# Patient Record
Sex: MALE | Race: WHITE | NOT HISPANIC OR LATINO | Employment: FULL TIME | URBAN - METROPOLITAN AREA
[De-identification: names, ages, dates, MRNs, and addresses within clinical notes are randomized per-mention and may not be internally consistent; named-entity substitution may affect disease eponyms.]

---

## 2017-02-15 ENCOUNTER — ALLSCRIPTS OFFICE VISIT (OUTPATIENT)
Dept: OTHER | Facility: OTHER | Age: 47
End: 2017-02-15

## 2017-02-24 ENCOUNTER — ALLSCRIPTS OFFICE VISIT (OUTPATIENT)
Dept: OTHER | Facility: OTHER | Age: 47
End: 2017-02-24

## 2017-02-24 ENCOUNTER — GENERIC CONVERSION - ENCOUNTER (OUTPATIENT)
Dept: OTHER | Facility: OTHER | Age: 47
End: 2017-02-24

## 2017-02-27 ENCOUNTER — ALLSCRIPTS OFFICE VISIT (OUTPATIENT)
Dept: OTHER | Facility: OTHER | Age: 47
End: 2017-02-27

## 2017-03-10 ENCOUNTER — ALLSCRIPTS OFFICE VISIT (OUTPATIENT)
Dept: OTHER | Facility: OTHER | Age: 47
End: 2017-03-10

## 2017-07-06 ENCOUNTER — GENERIC CONVERSION - ENCOUNTER (OUTPATIENT)
Dept: OTHER | Facility: OTHER | Age: 47
End: 2017-07-06

## 2017-07-06 LAB
A/G RATIO (HISTORICAL): 1.3 (ref 1.2–2.2)
ALBUMIN SERPL BCP-MCNC: 4 G/DL (ref 3.5–5.5)
ALP SERPL-CCNC: 93 IU/L (ref 39–117)
ALT SERPL W P-5'-P-CCNC: 23 IU/L (ref 0–44)
AST SERPL W P-5'-P-CCNC: 16 IU/L (ref 0–40)
BASOPHILS # BLD AUTO: 0 %
BASOPHILS # BLD AUTO: 0 X10E3/UL (ref 0–0.2)
BILIRUB SERPL-MCNC: 0.4 MG/DL (ref 0–1.2)
BUN SERPL-MCNC: 11 MG/DL (ref 6–24)
BUN/CREA RATIO (HISTORICAL): 13 (ref 9–20)
CALCIUM SERPL-MCNC: 9 MG/DL (ref 8.7–10.2)
CHLORIDE SERPL-SCNC: 99 MMOL/L (ref 96–106)
CHOLEST SERPL-MCNC: 115 MG/DL (ref 100–199)
CHOLEST/HDLC SERPL: 3.6 RATIO UNITS (ref 0–5)
CO2 SERPL-SCNC: 24 MMOL/L (ref 18–29)
CREAT SERPL-MCNC: 0.82 MG/DL (ref 0.76–1.27)
DEPRECATED RDW RBC AUTO: 14.2 % (ref 12.3–15.4)
EGFR AFRICAN AMERICAN (HISTORICAL): 122 ML/MIN/1.73
EGFR-AMERICAN CALC (HISTORICAL): 105 ML/MIN/1.73
EOSINOPHIL # BLD AUTO: 0.3 X10E3/UL (ref 0–0.4)
EOSINOPHIL # BLD AUTO: 3 %
GLUCOSE SERPL-MCNC: 153 MG/DL (ref 65–99)
HBA1C MFR BLD HPLC: 7.2 % (ref 4.8–5.6)
HCT VFR BLD AUTO: 47.5 % (ref 37.5–51)
HDLC SERPL-MCNC: 32 MG/DL
HGB BLD-MCNC: 16.1 G/DL (ref 12.6–17.7)
IMM.GRANULOCYTES (CD4/8) (HISTORICAL): 0 %
IMM.GRANULOCYTES (CD4/8) (HISTORICAL): 0 X10E3/UL (ref 0–0.1)
INTERPRETATION (HISTORICAL): NORMAL
LDLC SERPL CALC-MCNC: 52 MG/DL (ref 0–99)
LYMPHOCYTES # BLD AUTO: 2.2 X10E3/UL (ref 0.7–3.1)
LYMPHOCYTES # BLD AUTO: 28 %
MCH RBC QN AUTO: 29.8 PG (ref 26.6–33)
MCHC RBC AUTO-ENTMCNC: 33.9 G/DL (ref 31.5–35.7)
MCV RBC AUTO: 88 FL (ref 79–97)
MONOCYTES # BLD AUTO: 0.4 X10E3/UL (ref 0.1–0.9)
MONOCYTES (HISTORICAL): 6 %
NEUTROPHILS # BLD AUTO: 4.9 X10E3/UL (ref 1.4–7)
NEUTROPHILS # BLD AUTO: 63 %
PLATELET # BLD AUTO: 229 X10E3/UL (ref 150–379)
POTASSIUM SERPL-SCNC: 4.5 MMOL/L (ref 3.5–5.2)
RBC (HISTORICAL): 5.41 X10E6/UL (ref 4.14–5.8)
SODIUM SERPL-SCNC: 139 MMOL/L (ref 134–144)
TOT. GLOBULIN, SERUM (HISTORICAL): 3 G/DL (ref 1.5–4.5)
TOTAL PROTEIN (HISTORICAL): 7 G/DL (ref 6–8.5)
TRIGL SERPL-MCNC: 156 MG/DL (ref 0–149)
VLDLC SERPL CALC-MCNC: 31 MG/DL (ref 5–40)
WBC # BLD AUTO: 7.8 X10E3/UL (ref 3.4–10.8)

## 2017-07-07 ENCOUNTER — GENERIC CONVERSION - ENCOUNTER (OUTPATIENT)
Dept: OTHER | Facility: OTHER | Age: 47
End: 2017-07-07

## 2017-07-07 ENCOUNTER — ALLSCRIPTS OFFICE VISIT (OUTPATIENT)
Dept: OTHER | Facility: OTHER | Age: 47
End: 2017-07-07

## 2017-07-10 ENCOUNTER — ALLSCRIPTS OFFICE VISIT (OUTPATIENT)
Dept: OTHER | Facility: OTHER | Age: 47
End: 2017-07-10

## 2017-11-04 LAB — HBA1C MFR BLD HPLC: 7.1 % (ref 4.8–5.6)

## 2017-11-05 LAB
A/G RATIO (HISTORICAL): 1.6 (ref 1.2–2.2)
ALBUMIN SERPL BCP-MCNC: 4.4 G/DL (ref 3.5–5.5)
ALP SERPL-CCNC: 98 IU/L (ref 39–117)
ALT SERPL W P-5'-P-CCNC: 29 IU/L (ref 0–44)
AST SERPL W P-5'-P-CCNC: 19 IU/L (ref 0–40)
BASOPHILS # BLD AUTO: 0 %
BASOPHILS # BLD AUTO: 0 X10E3/UL (ref 0–0.2)
BILIRUB SERPL-MCNC: 0.5 MG/DL (ref 0–1.2)
BUN SERPL-MCNC: 11 MG/DL (ref 6–24)
BUN/CREA RATIO (HISTORICAL): 14 (ref 9–20)
CALCIUM SERPL-MCNC: 9.5 MG/DL (ref 8.7–10.2)
CHLORIDE SERPL-SCNC: 97 MMOL/L (ref 96–106)
CO2 SERPL-SCNC: 26 MMOL/L (ref 18–29)
CREAT SERPL-MCNC: 0.81 MG/DL (ref 0.76–1.27)
DEPRECATED RDW RBC AUTO: 14.1 % (ref 12.3–15.4)
EGFR AFRICAN AMERICAN (HISTORICAL): 121 ML/MIN/1.73
EGFR-AMERICAN CALC (HISTORICAL): 105 ML/MIN/1.73
EOSINOPHIL # BLD AUTO: 0.3 X10E3/UL (ref 0–0.4)
EOSINOPHIL # BLD AUTO: 4 %
GLUCOSE SERPL-MCNC: 133 MG/DL (ref 65–99)
HCT VFR BLD AUTO: 47.5 % (ref 37.5–51)
HGB BLD-MCNC: 16.3 G/DL (ref 12.6–17.7)
IMM.GRANULOCYTES (CD4/8) (HISTORICAL): 0 %
IMM.GRANULOCYTES (CD4/8) (HISTORICAL): 0 X10E3/UL (ref 0–0.1)
LYMPHOCYTES # BLD AUTO: 2.4 X10E3/UL (ref 0.7–3.1)
LYMPHOCYTES # BLD AUTO: 32 %
MCH RBC QN AUTO: 30.5 PG (ref 26.6–33)
MCHC RBC AUTO-ENTMCNC: 34.3 G/DL (ref 31.5–35.7)
MCV RBC AUTO: 89 FL (ref 79–97)
MONOCYTES # BLD AUTO: 0.5 X10E3/UL (ref 0.1–0.9)
MONOCYTES (HISTORICAL): 6 %
NEUTROPHILS # BLD AUTO: 4.4 X10E3/UL (ref 1.4–7)
NEUTROPHILS # BLD AUTO: 58 %
PLATELET # BLD AUTO: 207 X10E3/UL (ref 150–379)
POTASSIUM SERPL-SCNC: 5.1 MMOL/L (ref 3.5–5.2)
RBC (HISTORICAL): 5.35 X10E6/UL (ref 4.14–5.8)
SODIUM SERPL-SCNC: 137 MMOL/L (ref 134–144)
TOT. GLOBULIN, SERUM (HISTORICAL): 2.7 G/DL (ref 1.5–4.5)
TOTAL PROTEIN (HISTORICAL): 7.1 G/DL (ref 6–8.5)
WBC # BLD AUTO: 7.6 X10E3/UL (ref 3.4–10.8)

## 2017-11-06 ENCOUNTER — GENERIC CONVERSION - ENCOUNTER (OUTPATIENT)
Dept: OTHER | Facility: OTHER | Age: 47
End: 2017-11-06

## 2017-11-08 ENCOUNTER — ALLSCRIPTS OFFICE VISIT (OUTPATIENT)
Dept: OTHER | Facility: OTHER | Age: 47
End: 2017-11-08

## 2018-01-10 NOTE — RESULT NOTES
Verified Results  [facility]) CMP14+eGFR [id number] 09:40AM Christy Peraza     Test Name Result Flag Reference   Glucose, Serum 152 mg/dL H 65-99   BUN 12 mg/dL  6-24   Creatinine, Serum 0 84 mg/dL  0 76-1 27   eGFR If NonAfricn Am 105 mL/min/1 73  >59   eGFR If Africn Am 121 mL/min/1 73  >59   BUN/Creatinine Ratio 14  9-20   Sodium, Serum 140 mmol/L  134-144   Potassium, Serum 4 5 mmol/L  3 5-5 2   Chloride, Serum 101 mmol/L     Carbon Dioxide, Total 24 mmol/L  18-29   Calcium, Serum 9 0 mg/dL  8 7-10 2   Protein, Total, Serum 6 9 g/dL  6 0-8 5   Albumin, Serum 4 1 g/dL  3 5-5 5   Globulin, Total 2 8 g/dL  1 5-4 5   A/G Ratio 1 5  1 1-2 5   Bilirubin, Total 0 4 mg/dL  0 0-1 2   Alkaline Phosphatase, S 99 IU/L     AST (SGOT) 14 IU/L  0-40   ALT (SGPT) 22 IU/L  0-44     (LC) Lipid Panel With LDL/HDL Ratio [id number] 09:40AM [facility]     Test Name Result Flag Reference   Cholesterol, Total 121 mg/dL  100-199   Triglycerides 161 mg/dL H 0-149   HDL Cholesterol 27 mg/dL L >39   According to ATP-III Guidelines, HDL-C >59 mg/dL is considered a  negative risk factor for CHD  VLDL Cholesterol Lon 32 mg/dL  5-40   LDL Cholesterol Calc 62 mg/dL  0-99   LDL/HDL Ratio 2 3 ratio units  0 0-3 6   LDL/HDL Ratio                                                             Men  Women                                               1/2 Avg  Risk  1 0    1 5                                                   Avg Risk  3 6    3 2                                                2X Avg  Risk  6 2    5 0                                                3X Avg  Risk  8 0    6 1     (LC) Microalbumin, Random Urine [id number] 09:40AM [facility]     Test Name Result Flag Reference   Microalbumin, Urine 97 9 ug/mL H 0 0-17 0       Discussion/Summary   please make appt to discuss labs

## 2018-01-10 NOTE — RESULT NOTES
Discussion/Summary   will discuss labs at follow up appt     Verified Results  (1) CBC/PLT/DIFF [id number] 08:23AM [facility]     Test Name Result Flag Reference   WBC 7 8 x10E3/uL  3 4-10 8   RBC 5 41 x10E6/uL  4 14-5 80   Hemoglobin 16 1 g/dL  12 6-17 7   Hematocrit 47 5 %  37 5-51 0   MCV 88 fL  79-97   MCH 29 8 pg  26 6-33 0   MCHC 33 9 g/dL  31 5-35 7   RDW 14 2 %  12 3-15 4   Platelets 808 H31A8/UV  150-379   Neutrophils 63 %     Lymphs 28 %     Monocytes 6 %     Eos 3 %     Basos 0 %     Neutrophils (Absolute) 4 9 x10E3/uL  1 4-7 0   Lymphs (Absolute) 2 2 x10E3/uL  0 7-3 1   Monocytes(Absolute) 0 4 x10E3/uL  0 1-0 9   Eos (Absolute) 0 3 x10E3/uL  0 0-0 4   Baso (Absolute) 0 0 x10E3/uL  0 0-0 2   Immature Granulocytes 0 %     Immature Grans (Abs) 0 0 x10E3/uL  0 0-0 1     (1) COMPREHENSIVE METABOLIC PANEL [id number] 40:83ZP [facility]     Test Name Result Flag Reference   Glucose, Serum 153 mg/dL H 65-99   BUN 11 mg/dL  6-24   Creatinine, Serum 0 82 mg/dL  0 76-1 27   BUN/Creatinine Ratio 13  9-20   Sodium, Serum 139 mmol/L  134-144   Potassium, Serum 4 5 mmol/L  3 5-5 2   Chloride, Serum 99 mmol/L     Carbon Dioxide, Total 24 mmol/L  18-29   Calcium, Serum 9 0 mg/dL  8 7-10 2   Protein, Total, Serum 7 0 g/dL  6 0-8 5   Albumin, Serum 4 0 g/dL  3 5-5 5   Globulin, Total 3 0 g/dL  1 5-4 5   A/G Ratio 1 3  1 2-2 2   Bilirubin, Total 0 4 mg/dL  0 0-1 2   Alkaline Phosphatase, S 93 IU/L     AST (SGOT) 16 IU/L  0-40   ALT (SGPT) 23 IU/L  0-44   eGFR If NonAfricn Am 105 mL/min/1 73  >59   eGFR If Africn Am 122 mL/min/1 73  >59     (1) LIPID PANEL, FASTING [id number] 08:23AM Aly Villarreal     Test Name Result Flag Reference   Cholesterol, Total 115 mg/dL  100-199   Triglycerides 156 mg/dL H 0-149   HDL Cholesterol 32 mg/dL L >39   VLDL Cholesterol Lon 31 mg/dL  5-40   LDL Cholesterol Calc 52 mg/dL  0-99   T  Chol/HDL Ratio 3 6 ratio units  0 0-5 0   T   Chol/HDL Ratio Men  Women                                               1/2 Avg  Risk  3 4    3 3                                                   Avg Risk  5 0    4 4                                                2X Avg  Risk  9 6    7 1                                                3X Avg  Risk 23 4   11 0     (1) HEMOGLOBIN A1C [id number] 08:23AM [email or web address]     Test Name Result Flag Reference   Hemoglobin A1c 7 2 % H 4 8-5 6   Pre-diabetes: 5 7 - 6 4           Diabetes: >6 4           Glycemic control for adults with diabetes: <7 0     [facility]) Cardiovascular Risk Assessment [id number] 08:23AM [email or web address]     Test Name Result Flag Reference   Interpretation Note     Supplement report is available  PDF Image

## 2018-01-10 NOTE — RESULT NOTES
Verified Results  (1924 [facility]) Hemoglobin A1c [id number] 09:40AM [place] [facility]     Test Name Result Flag Reference   Hemoglobin A1c 7 8 %Hb H    Reference Range:  American Diabetes Association (ADA) Guidelines:  <5 7: Decreased risk for diabetes  5 7 - 6 4: Increased risk for diabetes  >6 4: Ongoing Hyperglycemia of any cause  <7 0: Glycemic control for adults with diabetes   Estimated Average Glucose 177 mg/dL         Discussion/Summary   will discuss labs at follow up appt

## 2018-01-12 VITALS
HEART RATE: 77 BPM | HEIGHT: 71 IN | BODY MASS INDEX: 44.1 KG/M2 | WEIGHT: 315 LBS | SYSTOLIC BLOOD PRESSURE: 120 MMHG | OXYGEN SATURATION: 95 % | DIASTOLIC BLOOD PRESSURE: 80 MMHG

## 2018-01-12 NOTE — RESULT NOTES
Verified Results  (1) HEMOGLOBIN A1C [id number] 09:40AM Juan José Musca     Test Name Result Flag Reference   Hemoglobin A1c 7 1 % H 4 8-5 6   Pre-diabetes: 5 7 - 6 4           Diabetes: >6 4           Glycemic control for adults with diabetes: <7 0     (1) MICROALBUMIN CREATININE RATIO, RANDOM URINE [id number] 09:40AM Juan José Musca     Test Name Result Flag Reference   Creatinine, Urine 172 7 mg/dL  Not Estab  Microalbumin, Urine 105 8 ug/mL  Not Estab     Microalb/Creat Ratio 61 3 mg/g creat H 0 0-30 0     (1) CBC/PLT/DIFF [id number] 09:40AM Juan José Musca     Test Name Result Flag Reference   WBC 9 0 x10E3/uL  3 4-10 8   RBC 5 59 x10E6/uL  4 14-5 80   Hemoglobin 16 6 g/dL  12 6-17 7   Hematocrit 48 2 %  37 5-51 0   MCV 86 fL  79-97   MCH 29 7 pg  26 6-33 0   MCHC 34 4 g/dL  31 5-35 7   RDW 14 0 %  12 3-15 4   Platelets 153 Z55P5/CLEO  150-379   Neutrophils 67 %     Lymphs 25 %     Monocytes 5 %     Eos 3 %     Basos 0 %     Neutrophils (Absolute) 5 9 x10E3/uL  1 4-7 0   Lymphs (Absolute) 2 3 x10E3/uL  0 7-3 1   Monocytes(Absolute) 0 5 x10E3/uL  0 1-0 9   Eos (Absolute) 0 3 x10E3/uL  0 0-0 4   Baso (Absolute) 0 0 x10E3/uL  0 0-0 2   Immature Granulocytes 0 %     Immature Grans (Abs) 0 0 x10E3/uL  0 0-0 1     (1) COMPREHENSIVE METABOLIC PANEL [id number] 67:98GT Juan José Musca     Test Name Result Flag Reference   Glucose, Serum 146 mg/dL H 65-99   BUN 14 mg/dL  6-24   Creatinine, Serum 0 89 mg/dL  0 76-1 27   eGFR If NonAfricn Am 102 mL/min/1 73  >59   eGFR If Africn Am 118 mL/min/1 73  >59   BUN/Creatinine Ratio 16  9-20   Sodium, Serum 140 mmol/L  134-144   Potassium, Serum 4 7 mmol/L  3 5-5 2   Chloride, Serum 99 mmol/L     Carbon Dioxide, Total 26 mmol/L  18-29   Calcium, Serum 9 5 mg/dL  8 7-10 2   Protein, Total, Serum 7 3 g/dL  6 0-8 5   Albumin, Serum 4 3 g/dL  3 5-5 5   Globulin, Total 3 0 g/dL  1 5-4 5   A/G Ratio 1 4  1 1-2 5   **Effective March 13, 2017 the reference interval** for A/G Ratio will be changing to:                               Age                Male          Female                           0 -  7 days       1 1 - 2 3       1 1 - 2 3                           8 - 30 days       1 2 - 2 8       1 2 - 2 8                           1 -  6 months     1 3 - 3 6       1 3 - 3 6                    7 months -  5 years      1 5 - 2 6       1 5 - 2 6                              > 5 years      1 2 - 2 2       1 2 - 2 2   Bilirubin, Total 0 5 mg/dL  0 0-1 2   Alkaline Phosphatase, S 97 IU/L     AST (SGOT) 12 IU/L  0-40   ALT (SGPT) 20 IU/L  0-44       Discussion/Summary   will discuss labs at follow up appt

## 2018-01-13 VITALS
HEIGHT: 71 IN | SYSTOLIC BLOOD PRESSURE: 122 MMHG | HEART RATE: 74 BPM | WEIGHT: 315 LBS | BODY MASS INDEX: 44.1 KG/M2 | DIASTOLIC BLOOD PRESSURE: 86 MMHG | RESPIRATION RATE: 16 BRPM | TEMPERATURE: 97.1 F

## 2018-01-13 NOTE — RESULT NOTES
Verified Results  (1) COMPREHENSIVE METABOLIC PANEL [id number] 42:13FH Brock Dill     Test Name Result Flag Reference   Glucose, Serum 113 mg/dL H 65-99   BUN 11 mg/dL  6-24   Creatinine, Serum 0 79 mg/dL  0 76-1 27   eGFR If NonAfricn Am 107 mL/min/1 73  >59   eGFR If Africn Am 124 mL/min/1 73  >59   BUN/Creatinine Ratio 14  9-20   Sodium, Serum 137 mmol/L  136-144   **Please note reference interval change**   Potassium, Serum 4 1 mmol/L  3 5-5 2   **Please note reference interval change**   Chloride, Serum 96 mmol/L L    **Please note reference interval change**   Carbon Dioxide, Total 24 mmol/L  18-29   Calcium, Serum 9 1 mg/dL  8 7-10 2   Protein, Total, Serum 7 0 g/dL  6 0-8 5   Albumin, Serum 4 2 g/dL  3 5-5 5   Globulin, Total 2 8 g/dL  1 5-4 5   A/G Ratio 1 5  1 1-2 5   Bilirubin, Total 0 4 mg/dL  0 0-1 2   Alkaline Phosphatase, S 97 IU/L     AST (SGOT) 13 IU/L  0-40   ALT (SGPT) 18 IU/L  0-44     (1) CBC/PLT/DIFF 22Oct2016 09:08AM Brock Dill     Test Name Result Flag Reference   WBC 8 8 x10E3/uL  3 4-10 8   RBC 5 34 x10E6/uL  4 14-5 80   Hemoglobin 15 7 g/dL  12 6-17 7   Hematocrit 46 2 %  37 5-51 0   MCV 87 fL  79-97   MCH 29 4 pg  26 6-33 0   MCHC 34 0 g/dL  31 5-35 7   RDW 13 9 %  12 3-15 4   Platelets 018 V61O5/PG  150-379   Neutrophils 65 %     Lymphs 26 %     Monocytes 6 %     Eos 3 %     Basos 0 %     Neutrophils (Absolute) 5 7 x10E3/uL  1 4-7 0   Lymphs (Absolute) 2 3 x10E3/uL  0 7-3 1   Monocytes(Absolute) 0 5 x10E3/uL  0 1-0 9   Eos (Absolute) 0 2 x10E3/uL  0 0-0 4   Baso (Absolute) 0 0 x10E3/uL  0 0-0 2   Immature Granulocytes 0 %     Immature Grans (Abs) 0 0 x10E3/uL  0 0-0 1     (1) LIPID PANEL, FASTING 22Oct2016 09:08AM [place] Dill     Test Name Result Flag Reference   Cholesterol, Total 156 mg/dL  100-199   Triglycerides 167 mg/dL H 0-149   HDL Cholesterol 32 mg/dL L >39   According to ATP-III Guidelines, HDL-C >59 mg/dL is considered a  negative risk factor for CHD    VLDL Cholesterol Lon 33 mg/dL  5-40   LDL Cholesterol Calc 91 mg/dL  0-99   T  Chol/HDL Ratio 4 9 ratio units  0 0-5 0   T  Chol/HDL Ratio                                                             Men  Women                                               1/2 Avg  Risk  3 4    3 3                                                   Avg Risk  5 0    4 4                                                2X Avg  Risk  9 6    7 1                                                3X Avg  Risk 23 4   11 0     (1) HEMOGLOBIN A1C 22Oct2016 09:08AM [place] [facility]     Test Name Result Flag Reference   Hemoglobin A1c 6 9 % H 4 8-5 6   Pre-diabetes: 5 7 - 6 4           Diabetes: >6 4           Glycemic control for adults with diabetes: <7 0     [facility]) Cardiovascular Risk Assessment 22Oct2016 09:08AM [place] [facility]     Test Name Result Flag Reference   Interpretation Note     Supplement report is available  PDF Image            Discussion/Summary   Will discuss labs at follow up appt

## 2018-01-13 NOTE — RESULT NOTES
Discussion/Summary   will discuss labs at follow up appt     Verified Results  (1) CBC/PLT/DIFF [id number] 09:05AM [place] 87     Test Name Result Flag Reference   WBC 7 6 x10E3/uL  3 4-10 8   RBC 5 35 x10E6/uL  4 14-5 80   Hemoglobin 16 3 g/dL  12 6-17 7   Hematocrit 47 5 %  37 5-51 0   MCV 89 fL  79-97   MCH 30 5 pg  26 6-33 0   MCHC 34 3 g/dL  31 5-35 7   RDW 14 1 %  12 3-15 4   Platelets 481 A79F4/NI  150-379   Neutrophils 58 %  Not Estab  Lymphs 32 %  Not Estab  Monocytes 6 %  Not Estab  Eos 4 %  Not Estab  Basos 0 %  Not Estab  Neutrophils (Absolute) 4 4 x10E3/uL  1 4-7 0   Lymphs (Absolute) 2 4 x10E3/uL  0 7-3 1   Monocytes(Absolute) 0 5 x10E3/uL  0 1-0 9   Eos (Absolute) 0 3 x10E3/uL  0 0-0 4   Baso (Absolute) 0 0 x10E3/uL  0 0-0 2   Immature Granulocytes 0 %  Not Estab     Immature Grans (Abs) 0 0 x10E3/uL  0 0-0 1     (1) COMPREHENSIVE METABOLIC PANEL [id number] 70:86SC [place] 87     Test Name Result Flag Reference   Glucose, Serum 133 mg/dL H 65-99   BUN 11 mg/dL  6-24   Creatinine, Serum 0 81 mg/dL  0 76-1 27   BUN/Creatinine Ratio 14  9-20   Sodium, Serum 137 mmol/L  134-144   Potassium, Serum 5 1 mmol/L  3 5-5 2   Chloride, Serum 97 mmol/L     Carbon Dioxide, Total 26 mmol/L  18-29   Calcium, Serum 9 5 mg/dL  8 7-10 2   Protein, Total, Serum 7 1 g/dL  6 0-8 5   Albumin, Serum 4 4 g/dL  3 5-5 5   Globulin, Total 2 7 g/dL  1 5-4 5   A/G Ratio 1 6  1 2-2 2   Bilirubin, Total 0 5 mg/dL  0 0-1 2   Alkaline Phosphatase, S 98 IU/L     AST (SGOT) 19 IU/L  0-40   ALT (SGPT) 29 IU/L  0-44   eGFR If NonAfricn Am 105 mL/min/1 73  >59   eGFR If Africn Am 121 mL/min/1 73  >59     (1) HEMOGLOBIN A1C [id number] 09:05AM [place] 87     Test Name Result Flag Reference   Hemoglobin A1c 7 1 % H 4 8-5 6   Pre-diabetes: 5 7 - 6 4           Diabetes: >6 4           Glycemic control for adults with diabetes: <7 0

## 2018-01-15 VITALS
TEMPERATURE: 97.4 F | BODY MASS INDEX: 43.54 KG/M2 | SYSTOLIC BLOOD PRESSURE: 114 MMHG | DIASTOLIC BLOOD PRESSURE: 78 MMHG | RESPIRATION RATE: 20 BRPM | HEIGHT: 71 IN | WEIGHT: 311 LBS | HEART RATE: 76 BPM

## 2018-01-15 NOTE — PROGRESS NOTES
Assessment    1  Encounter for preventive health examination (V70 0) (Z00 00)   2  Diabetes mellitus with polyneuropathy (250 60,357 2) (E11 42)   3  Chronic hypertension (401 9) (I10)   4  Systolic congestive heart failure (428 20,428 0) (I50 20)   5  Dyslipidemia (272 4) (E78 5)   6  Catheter Ablation Atrial Flutter   7  Need for vaccination (V05 9) (Z23)    Plan  Catheter Ablation Atrial Flutter, Chronic hypertension, Diabetes mellitus with  polyneuropathy, Dyslipidemia, Systolic congestive heart failure    · (1) CBC/PLT/DIFF; Status:Active; Requested for:08Mar2018;    · (1) COMPREHENSIVE METABOLIC PANEL; Status:Active; Requested for:08Mar2018;    · (1) HEMOGLOBIN A1C; Status:Active; Requested for:08Mar2018;    · (1) LIPID PANEL, FASTING; Status:Active; Requested for:08Mar2018;    · (1) MICROALBUMIN CREATININE RATIO, RANDOM URINE; Status:Active;  Requested  for:08Mar2018;   Chronic hypertension, Systolic congestive heart failure    · Enalapril Maleate 20 MG Oral Tablet; TAKE 1 TABLET TWICE DAILY  Diabetes mellitus with polyneuropathy    · Farxiga 10 MG Oral Tablet; take 1 tablet every day   · Tresiba FlexTouch 200 UNIT/ML Subcutaneous Solution Pen-injector; inject 45  units sub q qhs   · 2 - [facility]  *  Status: Hold For - Scheduling   Requested for: 09YIA4508  Care Summary provided  : Yes   · Valente Salcedo  ([facility]) Co-Management  *  Status: Hold For - Scheduling   Requested for: 33RMN5332  Care Summary provided  : Yes  Diabetes mellitus with polyneuropathy, Diabetes type 2, uncontrolled    · Rosuvastatin Calcium 20 MG Oral Tablet; take 1 tablet by mouth once daily  Diabetes type 2, uncontrolled    · Lantus SoloStar 100 UNIT/ML Subcutaneous Solution Pen-injector  Diabetes type 2, uncontrolled, Malignant hypertension with systolic CHF, NYHA class 2    · AmLODIPine Besylate 5 MG Oral Tablet (Norvasc); take 1 tablet by mouth  once daily as directed  Elevated triglycerides with high cholesterol    · Vascepa 1 GM Oral Capsule; TAKE 2 CAPSULE TWICE A DAY  Need for vaccination    · Follow-up visit in 4 Months Evaluation and Treatment  Follow-up  Status: Hold For -  Scheduling  Requested for: 59DWO3016   · Fluzone Quadrivalent Intramuscular Suspension; 0 5ml; To Be Done:  39POW9059  Systolic congestive heart failure    · Carvedilol 25 MG Oral Tablet; TAKE 1 TABLET TWICE DAILY WITH MEALS   · Spironolactone 25 MG Oral Tablet; TAKE 1 TABLET DAILY    Discussion/Summary    Meds adjusted  ivokana stopped    will follow labs in 4 months  Chief Complaint  pt present for CPE  ac/cma      History of Present Illness  HM, Adult Male: The patient is being seen for a health maintenance evaluation  General Health:   Screening:   HPI: pt is here for full physical has labs    pt states his ins is dropping his insulin    pt states he had minor numbness and tingling in the feet      Review of Systems    Constitutional: No fever or chills, feels well, no tiredness, no recent weight gain or weight loss  Eyes: No complaints of eye pain, no red eyes, no discharge from eyes, no itchy eyes  ENT: no complaints of earache, no hearing loss, no nosebleeds, no nasal discharge, no sore throat, no hoarseness  Cardiovascular: No complaints of slow heart rate, no fast heart rate, no chest pain, no palpitations, no leg claudication, no lower extremity  Respiratory: No complaints of shortness of breath, no wheezing, no cough, no SOB on exertion, no orthopnea or PND  Gastrointestinal: No complaints of abdominal pain, no constipation, no nausea or vomiting, no diarrhea or bloody stools  Genitourinary: No complaints of dysuria, no incontinence, no hesitancy, no nocturia, no genital lesion, no testicular pain  Musculoskeletal: No complaints of arthralgia, no myalgias, no joint swelling or stiffness, no limb pain or swelling     Integumentary: No complaints of skin rash or skin lesions, no itching, no skin wound, no dry skin  Neurological: No compliants of headache, no confusion, no convulsions, no numbness or tingling, no dizziness or fainting, no limb weakness, no difficulty walking  Psychiatric: Is not suicidal, no sleep disturbances, no anxiety or depression, no change in personality, no emotional problems  Endocrine: No complaints of proptosis, no hot flashes, no muscle weakness, no erectile dysfunction, no deepening of the voice, no feelings of weakness  Hematologic/Lymphatic: No complaints of swollen glands, no swollen glands in the neck, does not bleed easily, no easy bruising  Active Problems    1  Abnormal electrocardiogram (794 31) (R94 31)   2  BMI 40 0-44 9, adult (V85 41) (Z68 41)   3  Catheter Ablation Atrial Flutter   4  Chronic hypertension (401 9) (I10)   5  Diabetes mellitus with polyneuropathy (250 60,357 2) (E11 42)   6  Dyslipidemia (272 4) (E78 5)   7  Elevated triglycerides with high cholesterol (272 2) (E78 2)   8  Encounter for screening for cardiovascular disorders (V81 2) (Z13 6)   9  Former smoker (Z87 891)   10  General medical examination (V70 9) (Z00 00)   11  Heart disease (429 9) (I51 9)   12  Hospital discharge follow-up (V67 59) (Z09)   13  Hypoventilation associated with obesity (278 03) (E66 2)   14  Malignant hypertension with systolic CHF, NYHA class 2 (401 0,428 20,428 0)    (I11 0,I50 20)   15  Need for vaccination (V05 9) (Z23)   16  Obesity, Class III, BMI 40-49 9 (morbid obesity) (278 01) (E66 01)   17  Screening for diabetes mellitus (DM) (V77 1) (Z13 1)   18  Systolic congestive heart failure (428 20,428 0) (I50 20)   19  Venous insufficiency (chronic) (peripheral) (459 81) (I87 2)   20   Vitamin D deficiency (268 9) (E55 9)    Past Medical History    · History of _ (278 00)   · History of Acute bacterial prostatitis (601 0) (N41 0)   · History of Cellulitis of toe of right foot (681 10) (L03 031)   · History of Dermatophytosis of groin (110 3) (B35 6)   · History of atrial flutter (V12 59) (Z86 79)   · History of cough   · History of hypertension (V12 59) (Z86 79)   · History of hypokalemia (V12 29) (Z86 39)   · History of ingrown nail (V13 3) (Z87 2)   · History of obesity (V13 89) (Z86 39)   · History of obesity (V13 89) (Z86 39)   · History of onychomycosis (V12 09) (Z86 19)   · History of sleep apnea (V13 89) (Z86 69)   · History of type 2 diabetes mellitus (V12 29) (Z86 39)   · History of Hordeolum internum of right upper eyelid (373 12) (H00 021)   · History of Need for vaccination (V05 9) (Z23)   · History of Need for vaccination (V05 9) (Z23)   · History of Pre-op testing (V72 84) (Z01 818)   · History of Tinea pedis of both feet (110 4) (B35 3)   · History of Tinea pedis of right foot (110 4) (B35 3)    Surgical History    · History of Surgery Vas Deferens Vasectomy    Family History  Mother    · Family history of arthritis (V17 7) (Z82 61)   · Family history of cardiac arrhythmia (V17 49) (Z82 49)   · Family history of hypertension (V17 49) (Z82 49)  Father    · Family history of Anxiety   · Family history of arthritis (V17 7) (Z82 61)   · Family history of atrial fibrillation (V17 49) (Z82 49)   · Family history of hypertension (V17 49) (Z82 49)   · Family history of Impaired cognition    Social History    · Exercise: Walking   · Former smoker (F54 162)   ·    · Occasional alcohol use   · 1 drink/month   · Sleeps 6 -7 hours a day    Current Meds   1  AmLODIPine Besylate 5 MG Oral Tablet; take 1 tablet by mouth once daily as directed; Therapy: 21Mar2016 to (Richard Osnoreen)  Requested for: 28QHU1844; Last   Rx:34Rik7900 Ordered   2  BD Pen Needle Short U/F 31G X 8 MM Miscellaneous; use as directed; Therapy: 74Iut2454 to (Evaluate:07Vun7057)  Requested for: 16SCG7632; Last   Rx:70Iyl9436 Ordered   3  Carvedilol 25 MG Oral Tablet; TAKE 1 TABLET TWICE DAILY WITH MEALS;    Therapy: 04NDT0518 to (Evaluate:19Mar2018)  Requested for: 94AEQ4512; Last Rx:27Mar2017 Ordered   4  CoQ-10 200 MG CAPS; TAKE 1 CAPSULE DAILY WITH CHOLESTEROL MEDICATON; Therapy: 21NJS4174 to (Evaluate:27Pvo3391)  Requested for: 50BCJ2450; Last   Rx:05Apr2016 Ordered   5  Enalapril Maleate 20 MG Oral Tablet; TAKE 1 TABLET TWICE DAILY  Requested for:   64REL9891; Last Rx:45Pnq6629 Ordered   6  Lantus SoloStar 100 UNIT/ML Subcutaneous Solution Pen-injector; INJECT 45 UNITS   SUBCUTANEOUSLY ONCE DAILY AS DIRECTED; Therapy: 17ZDU5405 to (Evaluate:06Zuu7304)  Requested for: 02BVL7295; Last   Rx:01Nov2017 Ordered   7  Nyamyc 803607 UNIT/GM External Powder; APPLY TO AFFECTED AREA TWICE A DAY; Therapy: 07HGQ3166 to (Evaluate:28Anv1781)  Requested for: 07GDK9020; Last   Rx:09Jun2017 Ordered   8  OneTouch Delica Lancets Fine Miscellaneous; Test BS twice daily or as needed  ; Therapy: 30URX3414 to (Last Rx:15Jan2016)  Requested for: 71AMB0840 Ordered   9  OneTouch Lancets Miscellaneous; 1 Two Times A Day 250; Therapy: 24VAG7664 to (Last Rx:15Jan2016)  Requested for: 85NWF8183 Ordered   10  OneTouch Ultra Blue In Vitro Strip; test BID as directed, 250, insulin using; Therapy: 99FOM0918 to (Last Rx:15Jan2016)  Requested for: 83ZQD7941 Ordered   11  OneTouch Ultra System w/Device Kit; (250 00) test once daily; Therapy: 85VKN8643 to (Last Rx:15Jan2016)  Requested for: 74DGX2431 Ordered   12  Rosuvastatin Calcium 20 MG Oral Tablet; take 1 tablet by mouth once daily; Therapy: 73WPL1194 to (Evaluate:86Bhf8701)  Requested for: 17DQT0374; Last    Rx:99Bvd0961 Ordered   13  Spironolactone 25 MG Oral Tablet; TAKE 1 TABLET DAILY  Requested for: 62YQJ5471;    Last Rx:62Aqx3663 Ordered   14  Vascepa 1 GM Oral Capsule; TAKE 2 CAPSULE TWICE A DAY; Therapy: 20JPD5316 to (Chaffee Angle)  Requested for: 76LAN9219; Last    Rx:18Ftq1386 Ordered   15  Vitamin D 2000 UNIT Oral Tablet; Take one tablet daily;     Therapy: 23USC2293 to (Evaluate:56Kdl2159)  Requested for: 02Dct8774; Last Rx:05Apr2016 Ordered    Allergies    1  No Known Drug Allergies    Vitals   Recorded: 12IGL2015 05:25PM   Temperature 96 9 F   Heart Rate 82   Respiration 16   Systolic 025   Diastolic 80   Height 5 ft 11 in   Weight 315 lb    BMI Calculated 43 93   BSA Calculated 2 56     Physical Exam    Constitutional   General appearance: No acute distress, well appearing and well nourished  Head and Face   Head and face: Normal     Palpation of the face and sinuses: No sinus tenderness  Eyes   Conjunctiva and lids: No erythema, swelling or discharge  Pupils and irises: Equal, round, reactive to light  Ears, Nose, Mouth, and Throat   External inspection of ears and nose: Normal     Otoscopic examination: Tympanic membranes translucent with normal light reflex  Canals patent without erythema  Nasal mucosa, septum, and turbinates: Normal without edema or erythema  Lips, teeth, and gums: Normal, good dentition  Neck   Neck: Supple, symmetric, trachea midline, no masses  Thyroid: Normal, no thyromegaly  Pulmonary   Respiratory effort: No increased work of breathing or signs of respiratory distress  Auscultation of lungs: Clear to auscultation  Cardiovascular   Palpation of heart: Normal PMI, no thrills  Auscultation of heart: Normal rate and rhythm, normal S1 and S2, no murmurs  Abdomen   Abdomen: Non-tender, no masses  Liver and spleen: No hepatomegaly or splenomegaly  Lymphatic   Palpation of lymph nodes in neck: No lymphadenopathy  Palpation of lymph nodes in axillae: No lymphadenopathy  Palpation of lymph nodes in groin: No lymphadenopathy  Palpation of lymph nodes in other areas: No lymphadenopathy  Musculoskeletal   Gait and station: Normal     Skin   Skin and subcutaneous tissue: Normal without rashes or lesions         Results/Data  PHQ-2 Adult Depression Screening 13HYI4749 05:45PM Toni Reyes     Test Name Result Flag Reference   PHQ-2 Adult Depression Score 0 Over the last two weeks, how often have you been bothered by any of the following problems? Little interest or pleasure in doing things: Not at all - 0  Feeling down, depressed, or hopeless: Not at all - 0   PHQ-2 Adult Depression Screening Negative       (1) CBC/PLT/DIFF 03JIU9931 09:05AM Marifer Lentz     Test Name Result Flag Reference   WBC 7 6 x10E3/uL  3 4-10 8   RBC 5 35 x10E6/uL  4 14-5 80   Hemoglobin 16 3 g/dL  12 6-17 7   Hematocrit 47 5 %  37 5-51 0   MCV 89 fL  79-97   MCH 30 5 pg  26 6-33 0   MCHC 34 3 g/dL  31 5-35 7   RDW 14 1 %  12 3-15 4   Platelets 181 D90P7/BG  150-379   Neutrophils 58 %  Not Estab  Lymphs 32 %  Not Estab  Monocytes 6 %  Not Estab  Eos 4 %  Not Estab  Basos 0 %  Not Estab  Neutrophils (Absolute) 4 4 x10E3/uL  1 4-7 0   Lymphs (Absolute) 2 4 x10E3/uL  0 7-3 1   Monocytes(Absolute) 0 5 x10E3/uL  0 1-0 9   Eos (Absolute) 0 3 x10E3/uL  0 0-0 4   Baso (Absolute) 0 0 x10E3/uL  0 0-0 2   Immature Granulocytes 0 %  Not Estab     Immature Grans (Abs) 0 0 x10E3/uL  0 0-0 1     (1) COMPREHENSIVE METABOLIC PANEL 39TVG4403 42:31DR Marifer Lentz     Test Name Result Flag Reference   Glucose, Serum 133 mg/dL H 65-99   BUN 11 mg/dL  6-24   Creatinine, Serum 0 81 mg/dL  0 76-1 27   BUN/Creatinine Ratio 14  9-20   Sodium, Serum 137 mmol/L  134-144   Potassium, Serum 5 1 mmol/L  3 5-5 2   Chloride, Serum 97 mmol/L     Carbon Dioxide, Total 26 mmol/L  18-29   Calcium, Serum 9 5 mg/dL  8 7-10 2   Protein, Total, Serum 7 1 g/dL  6 0-8 5   Albumin, Serum 4 4 g/dL  3 5-5 5   Globulin, Total 2 7 g/dL  1 5-4 5   A/G Ratio 1 6  1 2-2 2   Bilirubin, Total 0 5 mg/dL  0 0-1 2   Alkaline Phosphatase, S 98 IU/L     AST (SGOT) 19 IU/L  0-40   ALT (SGPT) 29 IU/L  0-44   eGFR If NonAfricn Am 105 mL/min/1 73  >59   eGFR If Africn Am 121 mL/min/1 73  >59     (1) HEMOGLOBIN A1C 76EKC6370 09:05AM Marifer Lentz     Test Name Result Flag Reference   Hemoglobin A1c 7 1 % H 4 8-5 6 Pre-diabetes: 5 7 - 6 4           Diabetes: >6 4           Glycemic control for adults with diabetes: <7 0       Procedure    Procedure: Visual Acuity Test    Indication: routine screening  Inforrmation supplied by a Snellen chart     Results: 20/15 in both eyes with corrective device, 20/15 in the right eye with corrective device, 20/13 in the left eye with corrective device      Future Appointments    Date/Time Provider Specialty Site   03/14/2018 05:45 PM Jose Antonio Olmos, [address] [place] [address]     Signatures   Electronically signed by : Kyra Anand DO; Nov 8 2017  6:00PM EST                       (Author)

## 2018-01-15 NOTE — RESULT NOTES
Verified Results  [facility]) CBC+Platelet+Hem Review 76RAO6115 10:44AM Vikash Quinteros     Test Name Result Flag Reference   WBC 8 0 x10E3/uL  3 4-10 8   RBC 5 24 x10E6/uL  4 14-5 80   Hemoglobin 15 6 g/dL  12 6-17 7   Hematocrit 46 2 %  37 5-51 0   MCV 88 fL  79-97   MCH 29 8 pg  26 6-33 0   MCHC 33 8 g/dL  31 5-35 7   RDW 14 0 %  12 3-15 4   Platelets 775 I94W9/KM  150-379   Neutrophils 59 %     Lymphs 37 %     Monocytes 3 %     Eos 1 %     Basos 0 %     Neutrophils Absolute 4 7 X10E3/uL  1 4-7 0   Lymphs (Absolute) 3 0 X10E3/uL  0 7-3 1   Monocytes(Absolute) 0 2 X10E3/uL  0 1-0 9   Eos (Absolute Value) 0 1 X10E3/uL  0 0-0 4   Baso(Absolute) 0 0 X10E3/uL  0 0-0 2   RBC Comment RBC's appear normal   Normal   Platelet Comment Comment  Adequate   Platelet count verified by examination of peripheral blood smear         Discussion/Summary   lab acceptable

## 2018-01-16 NOTE — RESULT NOTES
Verified Results  [facility]) TSH+Free T4 20Jun2016 10:44AM [email or web address]     Test Name Result Flag Reference   TSH 1 820 uIU/mL  0 450-4 500   T4,Free(Direct) 0 92 ng/dL  0 82-1 77     [facility]) CMP14+eGFR 37QGJ4314 10:44AM [email or web address]     Test Name Result Flag Reference   Glucose, Serum 152 mg/dL H 65-99   BUN 15 mg/dL  6-24   Creatinine, Serum 0 85 mg/dL  0 76-1 27   eGFR If NonAfricn Am 104 mL/min/1 73  >59   eGFR If Africn Am 121 mL/min/1 73  >59   BUN/Creatinine Ratio 18  9-20   Sodium, Serum 141 mmol/L  134-144   Potassium, Serum 4 8 mmol/L  3 5-5 2   Chloride, Serum 100 mmol/L     Carbon Dioxide, Total 23 mmol/L  18-29   Calcium, Serum 9 2 mg/dL  8 7-10 2   Protein, Total, Serum 7 1 g/dL  6 0-8 5   Albumin, Serum 4 5 g/dL  3 5-5 5   Globulin, Total 2 6 g/dL  1 5-4 5   A/G Ratio 1 7  1 1-2 5   Bilirubin, Total 0 3 mg/dL  0 0-1 2   Alkaline Phosphatase, S 109 IU/L     AST (SGOT) 15 IU/L  0-40   ALT (SGPT) 21 IU/L  0-44     (1) HEMOGLOBIN A1C 20Jun2016 10:44AM [email or web address]     Test Name Result Flag Reference   Hemoglobin A1c 7 1 % H 4 8-5 6   Pre-diabetes: 5 7 - 6 4           Diabetes: >6 4           Glycemic control for adults with diabetes: <7 0       Discussion/Summary   will discuss labs at follow up appt

## 2018-01-16 NOTE — PROGRESS NOTES
Assessment    1  Cellulitis of toe of right foot (681 10) (L03 031)   2  Diabetes mellitus with polyneuropathy (250 60,357 2) (E11 42)    Plan    · Follow-up visit in 1 month Evaluation and Treatment  Follow-up  Status: Hold For -  Scheduling  Requested for: 88DKN6112   · Inspect your feet daily ; Status:Complete;   Done: [id number]    Discussion/Summary    Discussed proper diabetic footcare daily foot checks  Discussed risks of recurrence  Patient will follow-up for diabetic checkups  Patient will call if any signs of infection  Chief Complaint  Patient had removal of chronic ingrown nail on last visit here he should finish course of antibiotics infection and pain has resolved  Patient has not noticed any wounds or signs of infection  History of Present Illness  HPI: Patient has history of obesity patient has one year history of diabetes      Active Problems    1  Abnormal electrocardiogram (794 31) (R94 31)   2  Atrial flutter (427 32) (I48 92)   3  BMI 40 0-44 9, adult (V85 41) (Z68 41)   4  Catheter Ablation Atrial Flutter   5  Cellulitis of toe of right foot (681 10) (L03 031)   6  Chronic hypertension (401 9) (I10)   7  Cough (786 2) (R05)   8  Dermatophytosis of groin (110 3) (B35 6)   9  Diabetes mellitus with polyneuropathy (250 60,357 2) (E11 42)   10  Diabetes type 2, uncontrolled (250 02) (E11 65)   11  Dyslipidemia (272 4) (E78 5)   12  Encounter for screening for cardiovascular disorders (V81 2) (Z13 6)   13  Former smoker (Z87 891)   15  General medical examination (V70 9) (Z00 00)   15  Heart disease (429 9) (I51 9)   16  Hordeolum internum of right upper eyelid (373 12) (H00 021)   17  Hospital discharge follow-up (V67 59) (Z09)   18  Hypokalemia (276 8) (E87 6)   19  Hypoventilation associated with obesity (278 03) (E66 2)   20  Ingrowing nail (703 0) (L60 0)   21  Malignant hypertension with systolic CHF, NYHA class 2 (401 0,428 20,428 0)    (I11 0,I50 20)   22   Need for vaccination (V05 9) (Z23)   23  Obesity (278 00) (E66 9)   24  Obesity (278 00) (E66 9)   25  Obesity, Class III, BMI 40-49 9 (morbid obesity) (278 01) (E66 01)   26  Onychomycosis (110 1) (B35 1)   27  Pre-op testing (V72 84) (Z01 818)   28  Screening for diabetes mellitus (DM) (V77 1) (Z13 1)   29  Systolic congestive heart failure (428 20,428 0) (I50 20)   30  Tinea pedis of both feet (110 4) (B35 3)   31  Tinea pedis of right foot (110 4) (B35 3)   32  Venous insufficiency (chronic) (peripheral) (459 81) (I87 2)   33   Vitamin D deficiency (268 9) (E55 9)      Former smoker (H41 957)       Atrial flutter (427 32) (I48 92)       Diabetes type 2, uncontrolled (250 02) (E11 65)       Dyslipidemia (272 4) (E78 5)       BMI 40 0-44 9, adult (V85 41) (Z68 41)       Obesity, Class III, BMI 40-49 9 (morbid obesity) (278 01) (E66 01)       Diabetes mellitus with polyneuropathy (250 60) (E11 42)       Chronic hypertension (401 9) (I10)          Past Medical History    · History of _ (278 00)   · History of Acute bacterial prostatitis (601 0) (N41 0)   · History of hypertension (V12 59) (Z86 79)   · History of sleep apnea (V13 89) (Z86 69)   · History of type 2 diabetes mellitus (V12 29) (Z86 39)   · History of Need for vaccination (V05 9) (Z23)   · History of Need for vaccination (V05 9) (Z23)    Surgical History    · History of Surgery Vas Deferens Vasectomy    Family History  Mother    · Family history of arthritis (V17 7) (Z82 61)   · Family history of cardiac arrhythmia (V17 49) (Z82 49)   · Family history of hypertension (V17 49) (Z82 49)  Father    · Family history of Anxiety   · Family history of arthritis (V17 7) (Z82 61)   · Family history of atrial fibrillation (V17 49) (Z82 49)   · Family history of hypertension (V17 49) (Z82 49)   · Family history of Impaired cognition    Social History    · Exercise: Walking   · Former smoker (I37 355)   ·    · Occasional alcohol use   · 1 drink/month   · Sleeps 6 -7 hours a day      Former smoker (O06 561)          Current Meds   1  AmLODIPine Besylate 5 MG Oral Tablet; take 1 tablet by mouth once daily as directed; Therapy: 78KFE8471 to (Evaluate:2017)  Requested for: 66Qec7638; Last   Rx:2017 Ordered   2  Carvedilol 25 MG Oral Tablet; TAKE 1 TABLET TWICE DAILY WITH MEALS; Therapy: 24BUP9530 to (Evaluate:2017)  Requested for: 22Nrx2527; Last   Rx:2016 Ordered   3  Cefadroxil 500 MG Oral Capsule; TAKE 1 CAPSULE TWICE DAILY; Therapy: 88JYI8549 to (Evaluate:2017)  Requested for: 18MGX7647; Last   Rx:87Uca7434 Ordered   4  CoQ-10 200 MG CAPS; TAKE 1 CAPSULE DAILY WITH CHOLESTEROL MEDICATON; Therapy: 88KVD5541 to (Evaluate:2016)  Requested for: 91GZC6638; Last   Rx:09Zol5792 Ordered   5  Enalapril Maleate 20 MG Oral Tablet; TAKE 1 TABLET TWICE DAILY  Requested for:   44ZKM0896; Last D52AWP6451 Ordered   6  Invokana 300 MG Oral Tablet; take 1 tablet by mouth daily; Therapy: 47SLI2433 to (Evaluate:2017)  Requested for: 30LQR2808; Last   Rx:77Rqv3259 Ordered   7  Lantus SoloStar 100 UNIT/ML Subcutaneous Solution Pen-injector; INJECT 45 UNITS   SUBCUTANEOUSLY ONCE DAILY AS DIRECTED; Therapy: 88EZG7996 to (Evaluate:2017)  Requested for: 14YKY2222; Last   Rx:2017 Ordered   8  Nyamyc 319128 UNIT/GM External Powder; APPLY TO AFFECTED AREA TWICE A DAY; Therapy: 30VFT9686 to (Evaluate:2017)  Requested for: 35KYH9255; Last   Rx:14Lfr1856 Ordered   9  Omega 3 1000 MG Oral Capsule; take 4000mg if do not change your diet to more fish; Therapy: 72Hmx6422 to (Evaluate:2015)  Requested for: 04Dat2775; Last   Rx:38Tmd3347 Ordered   10  OneTouch Delica Lancets Fine Miscellaneous; Test BS twice daily or as needed  ; Therapy: 88XTD9427 to (Last Rx:2016)  Requested for: 49OOV0085 Ordered   11  OneTouch Lancets Miscellaneous; 1 Two Times A Day 250;     Therapy: 48JSG4215 to (Last Rx:2016)  Requested for: 06RNG6566 Ordered   12  OneTouch Ultra Blue In Vitro Strip; test BID as directed, 250, insulin using; Therapy: 36KBZ2362 to (Last Rx:15Jan2016)  Requested for: 74YVZ7367 Ordered   13  OneTouch Ultra System w/Device Kit; (250 00) test once daily; Therapy: 39NVD7478 to (Last Rx:15Jan2016)  Requested for: 00BYW9588 Ordered   14  Rosuvastatin Calcium 20 MG Oral Tablet; TAKE 1 BY MOUTH DAILY AT BEDTIME; Therapy: 00ALW5639 to (Last Rx:63Ljq4963) Ordered   15  Spironolactone 25 MG Oral Tablet; TAKE 1 TABLET DAILY  Requested for: 62KRK9658;    Last Rx:27Ggg0406 Ordered   16  Vitamin D 2000 UNIT Oral Tablet; Take one tablet daily; Therapy: 69Eqc9861 to (Kathrin Valencia)  Requested for: 05Apr2016; Last    Rx:05Dgk8062 Ordered    The medication list was reviewed and updated today  Allergies    1  No Known Drug Allergies    Vitals   Recorded: 32BQZ8904 01:59PM   Respiration 18   Height 5 ft 11 in   Weight 311 lb    BMI Calculated 43 38   BSA Calculated 2 54     Physical Exam  Left Foot: Appearance: Normal except as noted: Neetu Cruel Mild tinea peeling plantar feet right worse than left  Special Tests: Semirigid hammertoes 2 through 5 bilateral, morbid obesity  Right Foot: Appearance: Normal except as noted:  Evaluation of the great toe nail demonstrates no paronychia and no ingrown nail  Proximal aspect of nail significantly improved distal aspect of nail still thickened and dystrophic  Special Tests: resolving ingrown right hallux no signs of infection no exudate negative purulence  Neurological Exam: performed  Light touch was [employer] monofilament normal, mildly decreased vibratory sensation, but intact bilaterally  Vibratory sensation was decreased in both first metatarsophalangeal joints  Response to monofilament test was intact bilaterally  Vascular Exam: performed Dorsalis pedis pulses were 2/4 bilaterally  Posterior tibial pulses were 2/4 bilaterally   Capillary refill time was between 1-3 seconds bilaterally  Future Appointments    Date/Time Provider Specialty Site   06/05/2017 05:30 PM Tj Eric, [address]     Signatures   Electronically signed by :  Kevin Viveros DPM; Mar 10 2017  3:53PM EST                       (Author)

## 2018-01-22 VITALS — HEIGHT: 71 IN | RESPIRATION RATE: 18 BRPM | WEIGHT: 311 LBS | BODY MASS INDEX: 43.54 KG/M2

## 2018-01-22 VITALS
RESPIRATION RATE: 16 BRPM | HEART RATE: 82 BPM | BODY MASS INDEX: 44.1 KG/M2 | DIASTOLIC BLOOD PRESSURE: 80 MMHG | TEMPERATURE: 96.9 F | WEIGHT: 315 LBS | HEIGHT: 71 IN | SYSTOLIC BLOOD PRESSURE: 120 MMHG

## 2018-03-01 DIAGNOSIS — I50.20 SYSTOLIC HEART FAILURE, UNSPECIFIED HEART FAILURE CHRONICITY: Primary | ICD-10-CM

## 2018-03-01 RX ORDER — SPIRONOLACTONE 25 MG/1
25 TABLET ORAL DAILY
Qty: 30 TABLET | Refills: 5 | Status: SHIPPED | OUTPATIENT
Start: 2018-03-01 | End: 2018-03-02 | Stop reason: SDUPTHER

## 2018-03-01 RX ORDER — SPIRONOLACTONE 25 MG/1
1 TABLET ORAL DAILY
COMMUNITY
End: 2018-03-01 | Stop reason: SDUPTHER

## 2018-03-02 RX ORDER — SPIRONOLACTONE 25 MG/1
25 TABLET ORAL DAILY
Qty: 90 TABLET | Refills: 3 | Status: SHIPPED | OUTPATIENT
Start: 2018-03-02 | End: 2018-04-05 | Stop reason: SDUPTHER

## 2018-03-15 DIAGNOSIS — E11.42 DIABETIC POLYNEUROPATHY ASSOCIATED WITH TYPE 2 DIABETES MELLITUS (HCC): Primary | ICD-10-CM

## 2018-03-15 RX ORDER — PEN NEEDLE, DIABETIC 31 GX5/16"
NEEDLE, DISPOSABLE MISCELLANEOUS
Qty: 100 EACH | Refills: 2 | Status: SHIPPED | OUTPATIENT
Start: 2018-03-15 | End: 2021-01-20 | Stop reason: SDUPTHER

## 2018-03-25 DIAGNOSIS — B35.6 JOCK ITCH: Primary | ICD-10-CM

## 2018-03-26 RX ORDER — NYSTATIN 100000 [USP'U]/G
POWDER TOPICAL
Qty: 60 G | Refills: 1 | Status: SHIPPED | OUTPATIENT
Start: 2018-03-26 | End: 2018-09-28 | Stop reason: SDUPTHER

## 2018-04-05 DIAGNOSIS — I50.20 SYSTOLIC HEART FAILURE, UNSPECIFIED HEART FAILURE CHRONICITY: ICD-10-CM

## 2018-04-07 DIAGNOSIS — I10 HTN (HYPERTENSION), MALIGNANT: Primary | ICD-10-CM

## 2018-04-07 RX ORDER — SPIRONOLACTONE 25 MG/1
TABLET ORAL
Qty: 30 TABLET | Refills: 5 | Status: SHIPPED | OUTPATIENT
Start: 2018-04-07 | End: 2018-08-08 | Stop reason: SDUPTHER

## 2018-04-09 RX ORDER — CARVEDILOL 25 MG/1
TABLET ORAL
Qty: 180 TABLET | Refills: 3 | Status: SHIPPED | OUTPATIENT
Start: 2018-04-09 | End: 2018-08-08 | Stop reason: SDUPTHER

## 2018-05-01 LAB
ALBUMIN SERPL-MCNC: 4.1 G/DL (ref 3.5–5.5)
ALBUMIN/CREAT UR: 36.5 MG/G CREAT (ref 0–30)
ALBUMIN/GLOB SERPL: 1.3 {RATIO} (ref 1.2–2.2)
ALP SERPL-CCNC: 98 IU/L (ref 39–117)
ALT SERPL-CCNC: 23 IU/L (ref 0–44)
AST SERPL-CCNC: 14 IU/L (ref 0–40)
BASOPHILS # BLD AUTO: 0 X10E3/UL (ref 0–0.2)
BASOPHILS NFR BLD AUTO: 1 %
BILIRUB SERPL-MCNC: 0.5 MG/DL (ref 0–1.2)
BUN SERPL-MCNC: 14 MG/DL (ref 6–24)
BUN/CREAT SERPL: 17 (ref 9–20)
CALCIUM SERPL-MCNC: 9.4 MG/DL (ref 8.7–10.2)
CHLORIDE SERPL-SCNC: 99 MMOL/L (ref 96–106)
CHOLEST SERPL-MCNC: 109 MG/DL (ref 100–199)
CHOLEST/HDLC SERPL: 3.8 RATIO (ref 0–5)
CO2 SERPL-SCNC: 26 MMOL/L (ref 18–29)
CREAT SERPL-MCNC: 0.84 MG/DL (ref 0.76–1.27)
CREAT UR-MCNC: 180.4 MG/DL
EOSINOPHIL # BLD AUTO: 0.2 X10E3/UL (ref 0–0.4)
EOSINOPHIL NFR BLD AUTO: 3 %
ERYTHROCYTE [DISTWIDTH] IN BLOOD BY AUTOMATED COUNT: 13.7 % (ref 12.3–15.4)
GLOBULIN SER-MCNC: 3.1 G/DL (ref 1.5–4.5)
GLUCOSE SERPL-MCNC: 168 MG/DL (ref 65–99)
HBA1C MFR BLD: 8.2 % (ref 4.8–5.6)
HCT VFR BLD AUTO: 49 % (ref 37.5–51)
HDLC SERPL-MCNC: 29 MG/DL
HGB BLD-MCNC: 16.4 G/DL (ref 13–17.7)
IMM GRANULOCYTES # BLD: 0 X10E3/UL (ref 0–0.1)
IMM GRANULOCYTES NFR BLD: 0 %
LDLC SERPL CALC-MCNC: 53 MG/DL (ref 0–99)
LYMPHOCYTES # BLD AUTO: 2.2 X10E3/UL (ref 0.7–3.1)
LYMPHOCYTES NFR BLD AUTO: 27 %
MCH RBC QN AUTO: 30.1 PG (ref 26.6–33)
MCHC RBC AUTO-ENTMCNC: 33.5 G/DL (ref 31.5–35.7)
MCV RBC AUTO: 90 FL (ref 79–97)
MICROALBUMIN UR-MCNC: 65.8 UG/ML
MICRODELETION SYND BLD/T FISH: NORMAL
MONOCYTES # BLD AUTO: 0.5 X10E3/UL (ref 0.1–0.9)
MONOCYTES NFR BLD AUTO: 6 %
NEUTROPHILS # BLD AUTO: 5.4 X10E3/UL (ref 1.4–7)
NEUTROPHILS NFR BLD AUTO: 63 %
PLATELET # BLD AUTO: 226 X10E3/UL (ref 150–379)
POTASSIUM SERPL-SCNC: 4.7 MMOL/L (ref 3.5–5.2)
PROT SERPL-MCNC: 7.2 G/DL (ref 6–8.5)
RBC # BLD AUTO: 5.44 X10E6/UL (ref 4.14–5.8)
SL AMB EGFR AFRICAN AMERICAN: 120 ML/MIN/1.73
SL AMB EGFR NON AFRICAN AMERICAN: 104 ML/MIN/1.73
SL AMB VLDL CHOLESTEROL CALC: 27 MG/DL (ref 5–40)
SODIUM SERPL-SCNC: 141 MMOL/L (ref 134–144)
TRIGL SERPL-MCNC: 135 MG/DL (ref 0–149)
WBC # BLD AUTO: 8.4 X10E3/UL (ref 3.4–10.8)

## 2018-05-02 ENCOUNTER — OFFICE VISIT (OUTPATIENT)
Dept: FAMILY MEDICINE CLINIC | Facility: CLINIC | Age: 48
End: 2018-05-02
Payer: COMMERCIAL

## 2018-05-02 VITALS
SYSTOLIC BLOOD PRESSURE: 130 MMHG | RESPIRATION RATE: 20 BRPM | HEART RATE: 74 BPM | DIASTOLIC BLOOD PRESSURE: 84 MMHG | BODY MASS INDEX: 44.21 KG/M2 | TEMPERATURE: 98.6 F | WEIGHT: 315 LBS

## 2018-05-02 DIAGNOSIS — E78.5 DYSLIPIDEMIA: ICD-10-CM

## 2018-05-02 DIAGNOSIS — E11.42 DIABETIC POLYNEUROPATHY ASSOCIATED WITH TYPE 2 DIABETES MELLITUS (HCC): Primary | ICD-10-CM

## 2018-05-02 DIAGNOSIS — I50.20 MALIGNANT HYPERTENSION WITH SYSTOLIC CHF, NYHA CLASS 2 (HCC): ICD-10-CM

## 2018-05-02 DIAGNOSIS — I10 CHRONIC HYPERTENSION: ICD-10-CM

## 2018-05-02 DIAGNOSIS — I50.20 SYSTOLIC CONGESTIVE HEART FAILURE, UNSPECIFIED HF CHRONICITY (HCC): ICD-10-CM

## 2018-05-02 DIAGNOSIS — E66.01 CLASS 3 SEVERE OBESITY DUE TO EXCESS CALORIES WITH SERIOUS COMORBIDITY AND BODY MASS INDEX (BMI) OF 40.0 TO 44.9 IN ADULT (HCC): ICD-10-CM

## 2018-05-02 DIAGNOSIS — I11.0 MALIGNANT HYPERTENSION WITH SYSTOLIC CHF, NYHA CLASS 2 (HCC): ICD-10-CM

## 2018-05-02 PROCEDURE — 99214 OFFICE O/P EST MOD 30 MIN: CPT | Performed by: FAMILY MEDICINE

## 2018-05-02 RX ORDER — ICOSAPENT ETHYL 1000 MG/1
2 CAPSULE ORAL 2 TIMES DAILY
Refills: 0
Start: 2018-05-02 | End: 2018-07-20 | Stop reason: SDUPTHER

## 2018-05-02 RX ORDER — CHOLECALCIFEROL (VITAMIN D3) 50 MCG
1 TABLET ORAL DAILY
COMMUNITY
Start: 2016-04-05

## 2018-05-02 RX ORDER — AMLODIPINE BESYLATE 5 MG/1
TABLET ORAL
Refills: 0 | COMMUNITY
Start: 2018-04-09 | End: 2018-05-02 | Stop reason: SDUPTHER

## 2018-05-02 RX ORDER — DAPAGLIFLOZIN 10 MG/1
10 TABLET, FILM COATED ORAL DAILY
Refills: 0
Start: 2018-05-02 | End: 2018-06-17 | Stop reason: SDUPTHER

## 2018-05-02 RX ORDER — ROSUVASTATIN CALCIUM 20 MG/1
20 TABLET, COATED ORAL DAILY
Refills: 0
Start: 2018-05-02 | End: 2018-07-26 | Stop reason: SDUPTHER

## 2018-05-02 RX ORDER — DAPAGLIFLOZIN 10 MG/1
TABLET, FILM COATED ORAL
Refills: 0 | COMMUNITY
Start: 2018-04-16 | End: 2018-05-02 | Stop reason: SDUPTHER

## 2018-05-02 RX ORDER — ICOSAPENT ETHYL 1000 MG/1
CAPSULE ORAL
Refills: 0 | COMMUNITY
Start: 2018-04-22 | End: 2018-05-02 | Stop reason: SDUPTHER

## 2018-05-02 RX ORDER — INSULIN DEGLUDEC 200 U/ML
50 INJECTION, SOLUTION SUBCUTANEOUS DAILY
Qty: 3 PEN | Refills: 0
Start: 2018-05-02 | End: 2018-11-20 | Stop reason: SDUPTHER

## 2018-05-02 RX ORDER — ENALAPRIL MALEATE 20 MG/1
20 TABLET ORAL 2 TIMES DAILY
Refills: 0 | COMMUNITY
Start: 2018-04-09 | End: 2018-05-02 | Stop reason: SDUPTHER

## 2018-05-02 RX ORDER — INSULIN DEGLUDEC 200 U/ML
45 INJECTION, SOLUTION SUBCUTANEOUS DAILY
Refills: 0 | COMMUNITY
Start: 2018-04-08 | End: 2018-05-02 | Stop reason: SDUPTHER

## 2018-05-02 RX ORDER — ROSUVASTATIN CALCIUM 20 MG/1
TABLET, COATED ORAL
Refills: 1 | COMMUNITY
Start: 2018-04-22 | End: 2018-05-02 | Stop reason: SDUPTHER

## 2018-05-02 RX ORDER — AMLODIPINE BESYLATE 5 MG/1
5 TABLET ORAL DAILY
Refills: 0
Start: 2018-05-02 | End: 2018-07-27 | Stop reason: SDUPTHER

## 2018-05-02 RX ORDER — ENALAPRIL MALEATE 20 MG/1
20 TABLET ORAL 2 TIMES DAILY
Refills: 0
Start: 2018-05-02 | End: 2018-07-30 | Stop reason: SDUPTHER

## 2018-05-02 NOTE — PROGRESS NOTES
Assessment/Plan:    Problem List Items Addressed This Visit     Diabetic polyneuropathy associated with type 2 diabetes mellitus ([facility] 75 ) - Primary     Lost lots of ground on the diabetes, advised on weight loss and meds were adjusted         Relevant Medications    enalapril (VASOTEC) 20 mg tablet    metFORMIN (GLUCOPHAGE) 500 mg tablet    TRESIBA FLEXTOUCH injection pen 200 units/mL    FARXIGA 10 MG TABS    Other Relevant Orders    Comprehensive metabolic panel    CBC and differential    HEMOGLOBIN A1C W/ EAG ESTIMATION    Chronic hypertension    Relevant Medications    amLODIPine (NORVASC) 5 mg tablet    enalapril (VASOTEC) 20 mg tablet    Other Relevant Orders    CBC and differential    Dyslipidemia    Relevant Medications    rosuvastatin (CRESTOR) 20 MG tablet    VASCEPA 1 g CAPS    Other Relevant Orders    CBC and differential    Malignant hypertension with systolic CHF, NYHA class 2 (HCC)     stable         Relevant Medications    amLODIPine (NORVASC) 5 mg tablet    Systolic congestive heart failure (HCC)     No signs of chf today         Relevant Medications    amLODIPine (NORVASC) 5 mg tablet      Other Visit Diagnoses     Class 3 severe obesity due to excess calories with serious comorbidity and body mass index (BMI) of 40 0 to 44 9 in adult [facility])        Relevant Orders    CBC and differential    BMI 40 0-44 9, adult ([place] [address] )        Relevant Orders    CBC and differential          Patient Instructions   Obesity   AMBULATORY CARE:   Obesity  is when your body mass index (BMI) is greater than 30  Your healthcare provider will use your height and weight to measure your BMI  The risks of obesity include  many health problems, such as injuries or physical disability   You may need tests to check for the following:  · Diabetes     · High blood pressure or high cholesterol     · Heart disease     · Gallbladder or liver disease     · Cancer of the colon, breast, prostate, liver, or kidney     · Sleep apnea · Arthritis or gout  Seek care immediately if:   · You have a severe headache, confusion, or difficulty speaking  · You have weakness on one side of your body  · You have chest pain, sweating, or shortness of breath  Contact your healthcare provider if:   · You have symptoms of gallbladder or liver disease, such as pain in your upper abdomen  · You have knee or hip pain and discomfort while walking  · You have symptoms of diabetes, such as intense hunger and thirst, and frequent urination  · You have symptoms of sleep apnea, such as snoring or daytime sleepiness  · You have questions or concerns about your condition or care  Treatment for obesity  focuses on helping you lose weight to improve your health  Even a small decrease in BMI can reduce the risk for many health problems  Your healthcare provider will help you set a weight-loss goal   · Lifestyle changes  are the first step in treating obesity  These include making healthy food choices and getting regular physical activity  Your healthcare provider may suggest a weight-loss program that involves coaching, education, and therapy  · Medicine  may help you lose weight when it is used with a healthy diet and physical activity  · Surgery  can help you lose weight if you are very obese and have other health problems  There are several types of weight-loss surgery  Ask your healthcare provider for more information  Be successful losing weight:   · Set small, realistic goals  An example of a small goal is to walk for 20 minutes 5 days a week  Anther goal is to lose 5% of your body weight  · Tell friends, family members, and coworkers about your goals  and ask for their support  Ask a friend to lose weight with you, or join a weight-loss support group  · Identify foods or triggers that may cause you to overeat , and find ways to avoid them  Remove tempting high-calorie foods from your home and workplace   Place a bowl of fresh fruit on your kitchen counter  If stress causes you to eat, then find other ways to cope with stress  · Keep a diary to track what you eat and drink  Also write down how many minutes of physical activity you do each day  Weigh yourself once a week and record it in your diary  Eating changes: You will need to eat 500 to 1,000 fewer calories each day than you currently eat to lose 1 to 2 pounds a week  The following changes will help you cut calories:  · Eat smaller portions  Use small plates, no larger than 9 inches in diameter  Fill your plate half full of fruits and vegetables  Measure your food using measuring cups until you know what a serving size looks like  · Eat 3 meals and 1 or 2 snacks each day  Plan your meals in advance  Isabel Fried and eat at home most of the time  Eat slowly  · Eat fruits and vegetables at every meal   They are low in calories and high in fiber, which makes you feel full  Do not add butter, margarine, or cream sauce to vegetables  Use herbs to season steamed vegetables  · Eat less fat and fewer fried foods  Eat more baked or grilled chicken and fish  These protein sources are lower in calories and fat than red meat  Limit fast food  Dress your salads with olive oil and vinegar instead of bottled dressing  · Limit the amount of sugar you eat  Do not drink sugary beverages  Limit alcohol  Activity changes:  Physical activity is good for your body in many ways  It helps you burn calories and build strong muscles  It decreases stress and depression, and improves your mood  It can also help you sleep better  Talk to your healthcare provider before you begin an exercise program   · Exercise for at least 30 minutes 5 days a week  Start slowly  Set aside time each day for physical activity that you enjoy and that is convenient for you  It is best to do both weight training and an activity that increases your heart rate, such as walking, bicycling, or swimming  · Find ways to be more active  Do yard work and housecleaning  Walk up the stairs instead of using elevators  Spend your leisure time going to events that require walking, such as outdoor festivals or fairs  This extra physical activity can help you lose weight and keep it off  Follow up with your healthcare provider as directed: You may need to meet with a dietitian  Write down your questions so you remember to ask them during your visits  © 2017 [address] [employer] Information is for End User's use only and may not be sold, redistributed or otherwise used for commercial purposes  All illustrations and images included in CareNotes® are the copyrighted property of A D A M , Inc  or [employer]  The above information is an  only  It is not intended as medical advice for individual conditions or treatments  Talk to your doctor, nurse or pharmacist before following any medical regimen to see if it is safe and effective for you  Return in about 3 months (around 8/2/2018) for Recheck  Subjective:      Patient ID: Juancho Walters is a 49 y o  male  Chief Complaint   Patient presents with    Follow-up     on blood work done on 4/30/18  af/rma        Pt is here for a 4 monrth follow up  Pt had his labs for diabetes done  Pt denies cp, no sob no polyuria no polydipsia            The following portions of the patient's history were reviewed and updated as appropriate: allergies, current medications, past family history, past medical history, past social history, past surgical history and problem list     Review of Systems   Constitutional: Negative for activity change, appetite change, chills, diaphoresis, fatigue, fever and unexpected weight change  HENT: Negative for congestion, dental problem, ear pain, mouth sores, sinus pain, sinus pressure, sore throat and trouble swallowing  Eyes: Negative for photophobia, discharge and itching     Respiratory: Negative for apnea, chest tightness and shortness of breath  Cardiovascular: Negative for chest pain, palpitations and leg swelling  Gastrointestinal: Negative for abdominal distention, abdominal pain, blood in stool, nausea and vomiting  Endocrine: Negative for cold intolerance, heat intolerance, polydipsia, polyphagia and polyuria  Genitourinary: Negative for difficulty urinating  Musculoskeletal: Negative for arthralgias  Skin: Negative for color change and wound  Neurological: Negative for dizziness, syncope, speech difficulty and headaches  Hematological: Negative for adenopathy  Psychiatric/Behavioral: Negative for agitation and behavioral problems           Current Outpatient Prescriptions   Medication Sig Dispense Refill    amLODIPine (NORVASC) 5 mg tablet Take 1 tablet (5 mg total) by mouth daily  0    B-D ULTRAFINE III SHORT PEN 31G X 8 MM MISC use as directed BY  each 2    carvedilol (COREG) 25 mg tablet take 1 tablet by mouth twice a day with food 180 tablet 3    Cholecalciferol (VITAMIN D) 2000 units tablet Take 1 tablet by mouth daily      Coenzyme Q10 (COQ-10) 200 MG CAPS Take by mouth      enalapril (VASOTEC) 20 mg tablet Take 1 tablet (20 mg total) by mouth 2 (two) times a day  0    FARXIGA 10 MG TABS Take 1 tablet (10 mg total) by mouth daily  0    glucose blood test strip by In Vitro route      Insulin Pen Needle (B-D ULTRAFINE III SHORT PEN) 31G X 8 MM MISC by Does not apply route      NYSTATIN powder apply to affected area twice a day 60 g 1    ONETOUCH DELICA LANCETS FINE MISC by Does not apply route      rosuvastatin (CRESTOR) 20 MG tablet Take 1 tablet (20 mg total) by mouth daily  0    spironolactone (ALDACTONE) 25 mg tablet take 1 tablet by mouth once daily 30 tablet 5    TRESIBA FLEXTOUCH injection pen 200 units/mL Inject 50 Units under the skin daily 3 pen 0    VASCEPA 1 g CAPS Take 2 capsules (2 g total) by mouth 2 (two) times a day  0    metFORMIN (GLUCOPHAGE) 500 mg tablet Take 1 tablet (500 mg total) by mouth 2 (two) times a day with meals 180 tablet 1     No current facility-administered medications for this visit  Objective:    /84   Pulse 74   Temp 98 6 °F (37 °C)   Resp 20   Wt (!) 144 kg (317 lb)   BMI 44 21 kg/m²        Physical Exam   Constitutional: He appears well-developed and well-nourished  No distress  HENT:   Head: Normocephalic and atraumatic  Right Ear: External ear normal    Left Ear: External ear normal    Nose: Nose normal    Mouth/Throat: Oropharynx is clear and moist  No oropharyngeal exudate  Eyes: EOM are normal  Pupils are equal, round, and reactive to light  Right eye exhibits no discharge  Left eye exhibits no discharge  No scleral icterus  Neck: No thyromegaly present  Cardiovascular: Normal rate and normal heart sounds  Pulses are no weak pulses  No murmur heard  Pulses:       Dorsalis pedis pulses are 2+ on the right side, and 2+ on the left side  Posterior tibial pulses are 2+ on the right side, and 2+ on the left side  Pulmonary/Chest: Effort normal and breath sounds normal  No respiratory distress  He has no wheezes  Abdominal: Soft  Bowel sounds are normal  He exhibits no distension and no mass  There is no tenderness  There is no rebound and no guarding  Musculoskeletal: Normal range of motion  Feet:   Right Foot:   Skin Integrity: Negative for ulcer, skin breakdown, erythema, warmth, callus or dry skin  Left Foot:   Skin Integrity: Negative for ulcer, skin breakdown, erythema, warmth, callus or dry skin  Neurological: He is alert  He displays normal reflexes  Coordination normal    Skin: Skin is warm and dry  No rash noted  He is not diaphoretic  No erythema  Psychiatric: He has a normal mood and affect  His behavior is normal    Nursing note and vitals reviewed          Recent Results (from the past 840 hour(s))   CBC and differential    Collection Time: 04/30/18  8:54 AM   Result Value Ref Range    SL AMB LAB WHITE BLOOD CELL COUNT 8 4 3 4 - 10 8 x10E3/uL    SL AMB LAB RED BLOOD CELLS 5 44 4 14 - 5 80 x10E6/uL    Hemoglobin 16 4 13 0 - 17 7 g/dL    Hematocrit 49 0 37 5 - 51 0 %    MCV 90 79 - 97 fL    MCH 30 1 26 6 - 33 0 pg    MCHC 33 5 31 5 - 35 7 g/dL    RDW 13 7 12 3 - 15 4 %    Platelet Count 139 428 - 379 x10E3/uL    Neutrophils 63 Not Estab  %    Lymphocytes 27 Not Estab  %    Monocytes 6 Not Estab  %    Eosinophils 3 Not Estab  %    Basophils 1 Not Estab  %    Neutrophils (Absolute) 5 4 1 4 - 7 0 x10E3/uL    Lymphocytes (Absolute) 2 2 0 7 - 3 1 x10E3/uL    Monocytes (Absolute) 0 5 0 1 - 0 9 x10E3/uL    Eosinophils (Absolute) 0 2 0 0 - 0 4 x10E3/uL    Basophils (Absolute) 0 0 0 0 - 0 2 x10E3/uL    IMM  GRANULOCYTES (CD4/8) 0 Not Estab  %    IIMM  GRANS,ABS (CD4/8) 0 0 0 0 - 0 1 x10E3/uL   Comprehensive metabolic panel    Collection Time: 04/30/18  8:54 AM   Result Value Ref Range    SL AMB GLUCOSE 168 (H) 65 - 99 mg/dL    BUN 14 6 - 24 mg/dL    Creatinine, Serum 0 84 0 76 - 1 27 mg/dL    eGFR Non African American 104 >59 mL/min/1 73    SL AMB EGFR AFRICAN AMERICAN 120 >59 mL/min/1 73    SL AMB BUN/CREATININE RATIO 17 9 - 20    SL AMB SODIUM 141 134 - 144 mmol/L    SL AMB POTASSIUM 4 7 3 5 - 5 2 mmol/L    SL AMB CHLORIDE 99 96 - 106 mmol/L    SL AMB CARBON DIOXIDE 26 18 - 29 mmol/L    CALCIUM 9 4 8 7 - 10 2 mg/dL    SL AMB PROTEIN, TOTAL 7 2 6 0 - 8 5 g/dL    Serum Albumin 4 1 3 5 - 5 5 g/dL    Globulin, Total 3 1 1 5 - 4 5 g/dL    SL AMB ALBUMIN/GLOBULIN RATIO 1 3 1 2 - 2 2    SL AMB BILIRUBIN, TOTAL 0 5 0 0 - 1 2 mg/dL    Alk Phos Isoenzymes 98 39 - 117 IU/L    SL AMB AST 14 0 - 40 IU/L    SL AMB ALT 23 0 - 44 IU/L   LIPID PANEL WITH CHOL/HDL RATIO    Collection Time: 04/30/18  8:54 AM   Result Value Ref Range    Cholesterol, Total 109 100 - 199 mg/dL    Triglycerides 135 0 - 149 mg/dL    SL AMB HDL CHOLESTEROL 29 (L) >39 mg/dL    SL AMB VLDL CHOLESTEROL CALC 27 5 - 40 mg/dL    SL AMB LDL-CHOLESTEROL 53 0 - 99 mg/dL    T  Chol/HDL Ratio 3 8 0 0 - 5 0 ratio   Microalbumin / creatinine urine ratio    Collection Time: 04/30/18  8:54 AM   Result Value Ref Range    Creatinine, Urine 180 4 Not Estab  mg/dL    Microalbum  ,U,Random 65 8 Not Estab  ug/mL    Microalb/Creat Ratio 36 5 (H) 0 0 - 30 0 mg/g creat   Hemoglobin A1c    Collection Time: 04/30/18  8:54 AM   Result Value Ref Range    Hemoglobin A1C 8 2 (H) 4 8 - 5 6 %   Cardiovascular Report    Collection Time: 04/30/18  8:54 AM   Result Value Ref Range    SL AMB INTERPRETATION Note       Patient's shoes and socks removed  Right Foot/Ankle   Right Foot Inspection  Skin Exam: skin normal skin not intact, no dry skin, no warmth, no callus, no erythema, no maceration, no abnormal color, no pre-ulcer, no ulcer and no callus                          Toe Exam: ROM and strength within normal limits  Sensory   Vibration: intact  Proprioception: intact   Monofilament testing: intact  Vascular  Capillary refills: < 3 seconds  The right DP pulse is 2+  The right PT pulse is 2+  Left Foot/Ankle  Left Foot Inspection  Skin Exam: skin normalskin not intact, no dry skin, no warmth, no erythema, no maceration, normal color, no pre-ulcer, no ulcer and no callus                         Toe Exam: ROM and strength within normal limits                   Sensory   Vibration: intact  Proprioception: intact  Monofilament: intact  Vascular  Capillary refills: < 3 seconds  The left DP pulse is 2+  The left PT pulse is 2+  Assign Risk Category:  No deformity present; No loss of protective sensation;  No weak pulses       Risk: 0      Boy Drew DO

## 2018-05-02 NOTE — PATIENT INSTRUCTIONS
Obesity   AMBULATORY CARE:   Obesity  is when your body mass index (BMI) is greater than 30  Your healthcare provider will use your height and weight to measure your BMI  The risks of obesity include  many health problems, such as injuries or physical disability  You may need tests to check for the following:  · Diabetes     · High blood pressure or high cholesterol     · Heart disease     · Gallbladder or liver disease     · Cancer of the colon, breast, prostate, liver, or kidney     · Sleep apnea     · Arthritis or gout  Seek care immediately if:   · You have a severe headache, confusion, or difficulty speaking  · You have weakness on one side of your body  · You have chest pain, sweating, or shortness of breath  Contact your healthcare provider if:   · You have symptoms of gallbladder or liver disease, such as pain in your upper abdomen  · You have knee or hip pain and discomfort while walking  · You have symptoms of diabetes, such as intense hunger and thirst, and frequent urination  · You have symptoms of sleep apnea, such as snoring or daytime sleepiness  · You have questions or concerns about your condition or care  Treatment for obesity  focuses on helping you lose weight to improve your health  Even a small decrease in BMI can reduce the risk for many health problems  Your healthcare provider will help you set a weight-loss goal   · Lifestyle changes  are the first step in treating obesity  These include making healthy food choices and getting regular physical activity  Your healthcare provider may suggest a weight-loss program that involves coaching, education, and therapy  · Medicine  may help you lose weight when it is used with a healthy diet and physical activity  · Surgery  can help you lose weight if you are very obese and have other health problems  There are several types of weight-loss surgery  Ask your healthcare provider for more information    Be successful losing weight:   · Set small, realistic goals  An example of a small goal is to walk for 20 minutes 5 days a week  Anther goal is to lose 5% of your body weight  · Tell friends, family members, and coworkers about your goals  and ask for their support  Ask a friend to lose weight with you, or join a weight-loss support group  · Identify foods or triggers that may cause you to overeat , and find ways to avoid them  Remove tempting high-calorie foods from your home and workplace  Place a bowl of fresh fruit on your kitchen counter  If stress causes you to eat, then find other ways to cope with stress  · Keep a diary to track what you eat and drink  Also write down how many minutes of physical activity you do each day  Weigh yourself once a week and record it in your diary  Eating changes: You will need to eat 500 to 1,000 fewer calories each day than you currently eat to lose 1 to 2 pounds a week  The following changes will help you cut calories:  · Eat smaller portions  Use small plates, no larger than 9 inches in diameter  Fill your plate half full of fruits and vegetables  Measure your food using measuring cups until you know what a serving size looks like  · Eat 3 meals and 1 or 2 snacks each day  Plan your meals in advance  Marleni Stalls and eat at home most of the time  Eat slowly  · Eat fruits and vegetables at every meal   They are low in calories and high in fiber, which makes you feel full  Do not add butter, margarine, or cream sauce to vegetables  Use herbs to season steamed vegetables  · Eat less fat and fewer fried foods  Eat more baked or grilled chicken and fish  These protein sources are lower in calories and fat than red meat  Limit fast food  Dress your salads with olive oil and vinegar instead of bottled dressing  · Limit the amount of sugar you eat  Do not drink sugary beverages  Limit alcohol  Activity changes:  Physical activity is good for your body in many ways   It helps you burn calories and build strong muscles  It decreases stress and depression, and improves your mood  It can also help you sleep better  Talk to your healthcare provider before you begin an exercise program   · Exercise for at least 30 minutes 5 days a week  Start slowly  Set aside time each day for physical activity that you enjoy and that is convenient for you  It is best to do both weight training and an activity that increases your heart rate, such as walking, bicycling, or swimming  · Find ways to be more active  Do yard work and housecleaning  Walk up the stairs instead of using elevators  Spend your leisure time going to events that require walking, such as outdoor festivals or fairs  This extra physical activity can help you lose weight and keep it off  Follow up with your healthcare provider as directed: You may need to meet with a dietitian  Write down your questions so you remember to ask them during your visits  © 2017 [employer] [employer] Information is for End User's use only and may not be sold, redistributed or otherwise used for commercial purposes  All illustrations and images included in CareNotes® are the copyrighted property of A D [employer] M [employer]  or [employer]  The above information is an  only  It is not intended as medical advice for individual conditions or treatments  Talk to your doctor, nurse or pharmacist before following any medical regimen to see if it is safe and effective for you

## 2018-05-18 ENCOUNTER — TRANSCRIBE ORDERS (OUTPATIENT)
Dept: PULMONOLOGY | Facility: MEDICAL CENTER | Age: 48
End: 2018-05-18

## 2018-05-18 ENCOUNTER — TELEPHONE (OUTPATIENT)
Dept: PULMONOLOGY | Facility: MEDICAL CENTER | Age: 48
End: 2018-05-18

## 2018-05-18 DIAGNOSIS — G47.33 OBSTRUCTIVE SLEEP APNEA (ADULT) (PEDIATRIC): Primary | ICD-10-CM

## 2018-05-29 ENCOUNTER — OFFICE VISIT (OUTPATIENT)
Dept: PULMONOLOGY | Facility: MEDICAL CENTER | Age: 48
End: 2018-05-29
Payer: COMMERCIAL

## 2018-05-29 VITALS
HEIGHT: 71 IN | HEART RATE: 77 BPM | TEMPERATURE: 99 F | RESPIRATION RATE: 12 BRPM | OXYGEN SATURATION: 95 % | SYSTOLIC BLOOD PRESSURE: 136 MMHG | WEIGHT: 315 LBS | DIASTOLIC BLOOD PRESSURE: 88 MMHG | BODY MASS INDEX: 44.1 KG/M2

## 2018-05-29 DIAGNOSIS — E66.01 CLASS 3 SEVERE OBESITY DUE TO EXCESS CALORIES WITH SERIOUS COMORBIDITY AND BODY MASS INDEX (BMI) OF 40.0 TO 44.9 IN ADULT (HCC): ICD-10-CM

## 2018-05-29 DIAGNOSIS — I10 CHRONIC HYPERTENSION: ICD-10-CM

## 2018-05-29 DIAGNOSIS — G47.33 OBSTRUCTIVE SLEEP APNEA: Primary | ICD-10-CM

## 2018-05-29 DIAGNOSIS — Z98.890 STATUS POST CATHETER ABLATION OF ATRIAL FLUTTER: ICD-10-CM

## 2018-05-29 PROBLEM — IMO0001 CLASS 3 OBESITY IN ADULT: Status: ACTIVE | Noted: 2018-05-29

## 2018-05-29 PROCEDURE — 99203 OFFICE O/P NEW LOW 30 MIN: CPT | Performed by: INTERNAL MEDICINE

## 2018-05-29 NOTE — ASSESSMENT & PLAN NOTE
I did advise weight loss to Shalini Espinosa    I told this would help with his obstructive sleep apnea and overall health

## 2018-05-29 NOTE — ASSESSMENT & PLAN NOTE
Sanjana Feorz did have ablation therapy for atrial flutter  The past he had a nonischemic cardiomyopathy ejection fraction about 30-35% but apparently home recent testing which I do not have available to me his ejection fraction had normalized  He is not have any leg edema  He does follow with cardiologist Dr Larry Dixon    He also has a history of hypertension

## 2018-05-29 NOTE — ASSESSMENT & PLAN NOTE
Martha Corona has history of obstructive sleep apnea diagnosed in 2004  That time his diagnostic sleep study showed mild KYA with AHI of 11 and this got worse in the supine position  He did have excessive daytime somnolence then  He was started on CPAP therapy and presently is on a pressure of 12  His CPAP machine is now 15years old and appears outdated  He does use the CPAP every night with a nasal mask and tolerates it well  He does not use any he modification with his CPAP  He is in need of a new CPAP machine I will place an order for this  I also talked about different types of CPAP mask and suggested he may be comfortable with a [employer]pir[employer]ation nasal wisp mask  I will place an order for a new CPAP machine and supplies  Once he gets his new CPAP machine then compliance data can be downloaded and I can determine if his present CPAP setting is adequate  I did advise weight loss    Martha Corona does have history of hypertension has prior history of nonischemic cardiomyopathy but apparently on recent testing his ejection fraction had normalized  I did discuss the diagnosis and benefits and treatment of obstructive sleep apnea with Martha Corona  He states prior to his CPAP therapy he had excessive daytime somnolence and loud snoring  This has improved with therapy and is no longer having daytime sleepiness  He does drive about an hour more to work every day and does not feel like he is going to fall asleep driving

## 2018-05-29 NOTE — PROGRESS NOTES
Assessment/Plan:       Problem List Items Addressed This Visit        Respiratory    Obstructive sleep apnea - Primary     Kristen Cedillo has history of obstructive sleep apnea diagnosed in 2004  That time his diagnostic sleep study showed mild KYA with AHI of 11 and this got worse in the supine position  He did have excessive daytime somnolence then  He was started on CPAP therapy and presently is on a pressure of 12  His CPAP machine is now 15years old and appears outdated  He does use the CPAP every night with a nasal mask and tolerates it well  He does not use any he modification with his CPAP  He is in need of a new CPAP machine I will place an order for this  I also talked about different types of CPAP mask and suggested he may be comfortable with a [employer] nasal wisp mask  I will place an order for a new CPAP machine and supplies  Once he gets his new CPAP machine then compliance data can be downloaded and I can determine if his present CPAP setting is adequate  I did advise weight loss    Kristen Cedillo does have history of hypertension has prior history of nonischemic cardiomyopathy but apparently on recent testing his ejection fraction had normalized  I did discuss the diagnosis and benefits and treatment of obstructive sleep apnea with Kristen Cedillo  He states prior to his CPAP therapy he had excessive daytime somnolence and loud snoring  This has improved with therapy and is no longer having daytime sleepiness  He does drive about an hour more to work every day and does not feel like he is going to fall asleep driving  Relevant Orders    CPAP New DME       Cardiovascular and Mediastinum    Chronic hypertension       Other    Status post catheter ablation of atrial flutter     Kristen Cedillo did have ablation therapy for atrial flutter    The past he had a nonischemic cardiomyopathy ejection fraction about 30-35% but apparently home recent testing which I do not have available to me his ejection fraction had normalized  He is not have any leg edema  He does follow with cardiologist Dr Chayito Gomez  He also has a history of hypertension         Class 3 obesity in adult     I did advise weight loss to Melody  I told this would help with his obstructive sleep apnea and overall health                 Return in about 1 year (around 5/29/2019)  All questions are answered to the patient's satisfaction and understanding  He verbalizes understanding  He is encouraged to call with any further questions or concerns  Portions of the record may have been created with voice recognition software  Occasional wrong word or "sound a like" substitutions may have occurred due to the inherent limitations of voice recognition software  Read the chart carefully and recognize, using context, where substitutions have occurred  ______________________________________________________________________    Chief Complaint:   Chief Complaint   Patient presents with    Consult    Sleep Apnea     needs new mask  pt had  machine for 10 yrs and would like to have a new one        Patient ID: Melody is a 49 y o  y o  male has a past medical history of Acute bacterial prostatitis (09/28/2005); Atrial flutter ([address] ); Cellulitis of toe of right foot; Dermatophytosis of groin; Hypertension; Hypokalemia; Obesity; Onychomycosis; Sleep apnea; Tinea pedis of both feet; and Type 2 diabetes mellitus ([address] )     5/29/2018  HPI     Melody presents for evaluation of his obstructive sleep apnea  He was initially diagnosed back in August 2004  At that time he weighed 265 lb and use experiencing excessive daytime somnolence  On that diagnostic study overall AHI was 11  He only spent 14 min in the supine position but when he was in a supine position he had more obstructive events     His average oxygen saturation was 94% with andrew of 85% he alternately did go undergo CPAP titration and is now on CPAP pressure of 12 cm H2O    His CPAP machine is now 14 years old   He does used to CPAP every night and feels well rested in the morning  He has about an hour drive [address] to the [facility] and denies any excessive sleepiness while driving and overall [place] sleepiness Score is 5 with use of the CPAP  He denies any exertional dyspnea  He does have a history of atrial flutter in the past and had radiofrequency ablation therapy for this  He does have a history of hypertension  He quit smoking about 11 years ago and smoked a half a pack to a pack per day for 10 to 11 years  He denies any history of asthma and diabetes mellitus type 2  He also has history of hyperlipidemia  He has a history of nonischemic cardiomyopathy ejection fraction 35% and recently testing apparently revealed is ejection fraction be improved to 50 to 55%  He denies any leg edema or nocturnal dyspnea  He denies any morning headache  He states his weight has been relatively stable over this last several years  :  Review of Systems   Constitutional: Negative for chills, fever and unexpected weight change  HENT: Negative for congestion, rhinorrhea and sore throat  Eyes: Negative for discharge and redness  Cardiovascular: Negative for chest pain, palpitations and leg swelling  Gastrointestinal: Negative for abdominal distention, abdominal pain and nausea  Endocrine: Negative for polydipsia and polyphagia  Genitourinary: Negative for dysuria  Musculoskeletal: Negative for joint swelling and myalgias  Skin: Negative for rash  Neurological: Negative for light-headedness  Social history: He reports that he quit smoking about 3 years ago  He has a 11 00 pack-year smoking history  He has never used smokeless tobacco  He reports that he drinks alcohol  He reports that he does not use drugs      Past surgical history:   Past Surgical History:   Procedure Laterality Date    CARDIAC SURGERY      flutter repair    VASECTOMY  2005    Last Assessed: 5/28/2015      Family history:   Family History   Problem Relation Age of Onset    Arthritis Mother     Arrhythmia Mother     Hypertension Mother     Anxiety disorder Father     Arthritis Father     Atrial fibrillation Father     Hypertension Father     Other Father      Impaired cognition     Mental illness Neg Hx        Immunization History   Administered Date(s) Administered    Influenza Quadrivalent, 6-35 Months IM 11/30/2015, 10/25/2016, 11/08/2017    Pneumococcal Conjugate 13-Valent 11/30/2015    Tdap 06/24/2016     Current Outpatient Prescriptions   Medication Sig Dispense Refill    amLODIPine (NORVASC) 5 mg tablet Take 1 tablet (5 mg total) by mouth daily  0    B-D ULTRAFINE III SHORT PEN 31G X 8 MM MISC use as directed BY  each 2    carvedilol (COREG) 25 mg tablet take 1 tablet by mouth twice a day with food 180 tablet 3    Cholecalciferol (VITAMIN D) 2000 units tablet Take 1 tablet by mouth daily      Coenzyme Q10 (COQ-10) 200 MG CAPS Take by mouth      enalapril (VASOTEC) 20 mg tablet Take 1 tablet (20 mg total) by mouth 2 (two) times a day  0    FARXIGA 10 MG TABS Take 1 tablet (10 mg total) by mouth daily  0    glucose blood test strip by In Vitro route      Insulin Pen Needle (B-D ULTRAFINE III SHORT PEN) 31G X 8 MM MISC by Does not apply route      metFORMIN (GLUCOPHAGE) 500 mg tablet Take 1 tablet (500 mg total) by mouth 2 (two) times a day with meals 180 tablet 1    NYSTATIN powder apply to affected area twice a day 60 g 1    ONETOUCH DELICA LANCETS FINE MISC by Does not apply route      rosuvastatin (CRESTOR) 20 MG tablet Take 1 tablet (20 mg total) by mouth daily  0    spironolactone (ALDACTONE) 25 mg tablet take 1 tablet by mouth once daily 30 tablet 5    TRESIBA FLEXTOUCH injection pen 200 units/mL Inject 50 Units under the skin daily 3 pen 0    VASCEPA 1 g CAPS Take 2 capsules (2 g total) by mouth 2 (two) times a day  0     No current facility-administered medications for this visit  Allergies: Patient has no known allergies  Objective:  Vitals:    05/29/18 1435   BP: 136/88   BP Location: Right arm   Patient Position: Sitting   Cuff Size: Extra-Large   Pulse: 77   Resp: 12   Temp: 99 °F (37 2 °C)   TempSrc: Oral   SpO2: 95%   Weight: (!) 144 kg (318 lb)   Height: 5' 11" (1 803 m)   Oxygen Therapy  SpO2: 95 %    Wt Readings from Last 3 Encounters:   05/29/18 (!) 144 kg (318 lb)   05/02/18 (!) 144 kg (317 lb)   11/08/17 (!) 143 kg (315 lb)     Body mass index is 44 35 kg/m²  Physical Exam   Constitutional: He is oriented to person, place, and time  He appears well-developed and well-nourished  No distress  Alert, overweight   HENT:   Head: Normocephalic  Nose: Nose normal    Mouth/Throat: Oropharynx is clear and moist  No oropharyngeal exudate  Mallampati score is 3   Eyes: Conjunctivae are normal  Pupils are equal, round, and reactive to light  Neck: Neck supple  No JVD present  No tracheal deviation present  Cardiovascular: Normal rate, regular rhythm and normal heart sounds  Pulmonary/Chest: Effort normal  He has no wheezes  Lung sounds are clear to auscultation   Abdominal: Soft  He exhibits no distension  There is no tenderness  There is no guarding  Musculoskeletal: He exhibits no edema  Lymphadenopathy:     He has no cervical adenopathy  Neurological: He is alert and oriented to person, place, and time  Skin: Skin is warm and dry  No rash noted  Psychiatric: He has a normal mood and affect   His behavior is normal  Thought content normal        Diagnostics:            ESS:  5

## 2018-06-17 DIAGNOSIS — E11.42 DIABETIC POLYNEUROPATHY ASSOCIATED WITH TYPE 2 DIABETES MELLITUS (HCC): ICD-10-CM

## 2018-06-18 RX ORDER — DAPAGLIFLOZIN 10 MG/1
TABLET, FILM COATED ORAL
Qty: 90 TABLET | Refills: 0 | Status: SHIPPED | OUTPATIENT
Start: 2018-06-18 | End: 2018-09-23 | Stop reason: SDUPTHER

## 2018-06-22 ENCOUNTER — OFFICE VISIT (OUTPATIENT)
Dept: PULMONOLOGY | Facility: MEDICAL CENTER | Age: 48
End: 2018-06-22
Payer: COMMERCIAL

## 2018-06-22 VITALS
DIASTOLIC BLOOD PRESSURE: 82 MMHG | HEIGHT: 72 IN | HEART RATE: 81 BPM | OXYGEN SATURATION: 94 % | BODY MASS INDEX: 42.66 KG/M2 | SYSTOLIC BLOOD PRESSURE: 128 MMHG | WEIGHT: 315 LBS | RESPIRATION RATE: 16 BRPM | TEMPERATURE: 98.2 F

## 2018-06-22 DIAGNOSIS — G47.33 OBSTRUCTIVE SLEEP APNEA: Primary | ICD-10-CM

## 2018-06-22 PROCEDURE — 99213 OFFICE O/P EST LOW 20 MIN: CPT | Performed by: INTERNAL MEDICINE

## 2018-06-22 NOTE — PROGRESS NOTES
Assessment/Plan:     Problem List Items Addressed This Visit        Respiratory    Obstructive sleep apnea - Primary     Continued  compliance is encouraged  Uses CPAP during all sleep periods is discussed  Avoidance of sedatives, supine sleep, alcohol, narcotics in the setting of untreated sleep apnea is discussed  Absolute avoidance of driving while drowsy is also discussed  Avoid weight gain/encourage weight loss  Return in about 1 year (around 6/22/2019)  All questions are answered to the patient's satisfaction and understanding  He verbalizes understanding  He is encouraged to call with any further questions or concerns  Portions of the record may have been created with voice recognition software  Occasional wrong word or "sound a like" substitutions may have occurred due to the inherent limitations of voice recognition software  Read the chart carefully and recognize, using context, where substitutions have occurred  Electronically Signed by Corrinne Dauphin, MD    ______________________________________________________________________    Chief Complaint:   Chief Complaint   Patient presents with    Sleep Apnea     Compliance,new machine       Patient ID: Tatiana Zuleta is a 49 y o  y o  male has a past medical history of Acute bacterial prostatitis (09/28/2005); Atrial flutter ([address] ); Cellulitis of toe of right foot; Dermatophytosis of groin; Hypertension; Hypokalemia; Obesity; Onychomycosis; Sleep apnea; Tinea pedis of both feet; and Type 2 diabetes mellitus ([address] )  6/22/2018  Got his new machine about 2-3 weeks ago  No daytime sleepiness  He is on a pressure of 12 cm water  He uses a nasal mask  No leak noted unless the straps need to be adjusted  DME is [facility]      Review of Systems   All other systems reviewed and are negative  Smoking history: He reports that he quit smoking about 3 years ago  He has a 11 00 pack-year smoking history   He has never used smokeless tobacco     The following portions of the patient's history were reviewed and updated as appropriate: allergies, current medications, past family history, past medical history, past social history, past surgical history and problem list     Immunization History   Administered Date(s) Administered    Influenza Quadrivalent, 6-35 Months IM 11/30/2015, 10/25/2016, 11/08/2017    Pneumococcal Conjugate 13-Valent 11/30/2015    Tdap 06/24/2016     Current Outpatient Prescriptions   Medication Sig Dispense Refill    amLODIPine (NORVASC) 5 mg tablet Take 1 tablet (5 mg total) by mouth daily  0    B-D ULTRAFINE III SHORT PEN 31G X 8 MM MISC use as directed BY  each 2    carvedilol (COREG) 25 mg tablet take 1 tablet by mouth twice a day with food 180 tablet 3    Cholecalciferol (VITAMIN D) 2000 units tablet Take 1 tablet by mouth daily      Coenzyme Q10 (COQ-10) 200 MG CAPS Take by mouth      enalapril (VASOTEC) 20 mg tablet Take 1 tablet (20 mg total) by mouth 2 (two) times a day  0    FARXIGA 10 MG TABS take 1 tablet by mouth once daily 90 tablet 0    glucose blood test strip by In Vitro route      Insulin Pen Needle (B-D ULTRAFINE III SHORT PEN) 31G X 8 MM MISC by Does not apply route      metFORMIN (GLUCOPHAGE) 500 mg tablet Take 1 tablet (500 mg total) by mouth 2 (two) times a day with meals 180 tablet 1    NYSTATIN powder apply to affected area twice a day 60 g 1    ONETOUCH DELICA LANCETS FINE MISC by Does not apply route      rosuvastatin (CRESTOR) 20 MG tablet Take 1 tablet (20 mg total) by mouth daily  0    spironolactone (ALDACTONE) 25 mg tablet take 1 tablet by mouth once daily 30 tablet 5    TRESIBA FLEXTOUCH injection pen 200 units/mL Inject 50 Units under the skin daily 3 pen 0    VASCEPA 1 g CAPS Take 2 capsules (2 g total) by mouth 2 (two) times a day  0     No current facility-administered medications for this visit        Allergies: Patient has no known allergies  Objective:  Vitals:    06/22/18 0759   BP: 128/82   BP Location: Right arm   Patient Position: Sitting   Cuff Size: Standard   Pulse: 81   Resp: 16   Temp: 98 2 °F (36 8 °C)   TempSrc: Tympanic   SpO2: 94%   Weight: (!) 146 kg (322 lb)   Height: 6' (1 829 m)   Oxygen Therapy  SpO2: 94 %    Wt Readings from Last 3 Encounters:   06/22/18 (!) 146 kg (322 lb)   05/29/18 (!) 144 kg (318 lb)   05/02/18 (!) 144 kg (317 lb)     Body mass index is 43 67 kg/m²  Physical Exam   Constitutional: He is oriented to person, place, and time  He appears well-developed and well-nourished  No distress  HENT:   Head: Normocephalic and atraumatic  Mouth/Throat: Oropharynx is clear and moist  No oropharyngeal exudate  Mallampati 4   Eyes: EOM are normal  Pupils are equal, round, and reactive to light  Neck: Normal range of motion  Neck supple  No tracheal deviation present  No thyromegaly present  Cardiovascular: Normal rate and regular rhythm  No murmur heard  Pulmonary/Chest: Effort normal and breath sounds normal  No respiratory distress  He has no wheezes  He has no rales  He exhibits no tenderness  Abdominal: Soft  Bowel sounds are normal  He exhibits no distension  There is no tenderness  Musculoskeletal: Normal range of motion  He exhibits no edema  Lymphadenopathy:     He has no cervical adenopathy  Neurological: He is alert and oriented to person, place, and time  No cranial nerve deficit  Skin: Skin is warm and dry  He is not diaphoretic  Psychiatric: He has a normal mood and affect  His behavior is normal    Vitals reviewed  Diagnostics:  Compliance data is reviewed and residual AHI on 12 cm water was 0 7/hr  100% usage on average 7 hours and 45 minutes

## 2018-06-22 NOTE — ASSESSMENT & PLAN NOTE
Continued  compliance is encouraged  Uses CPAP during all sleep periods is discussed  Avoidance of sedatives, supine sleep, alcohol, narcotics in the setting of untreated sleep apnea is discussed  Absolute avoidance of driving while drowsy is also discussed  Avoid weight gain/encourage weight loss

## 2018-07-20 DIAGNOSIS — E78.5 DYSLIPIDEMIA: ICD-10-CM

## 2018-07-24 RX ORDER — ICOSAPENT ETHYL 1000 MG/1
2 CAPSULE ORAL 2 TIMES DAILY
Qty: 360 CAPSULE | Refills: 3 | Status: SHIPPED | OUTPATIENT
Start: 2018-07-24 | End: 2019-09-14 | Stop reason: SDUPTHER

## 2018-07-26 DIAGNOSIS — E78.5 DYSLIPIDEMIA: ICD-10-CM

## 2018-07-27 DIAGNOSIS — I10 CHRONIC HYPERTENSION: ICD-10-CM

## 2018-07-27 RX ORDER — AMLODIPINE BESYLATE 5 MG/1
5 TABLET ORAL DAILY
Qty: 90 TABLET | Refills: 3 | Status: SHIPPED | OUTPATIENT
Start: 2018-07-27 | End: 2018-08-08 | Stop reason: SDUPTHER

## 2018-07-27 RX ORDER — ROSUVASTATIN CALCIUM 20 MG/1
20 TABLET, COATED ORAL DAILY
Qty: 30 TABLET | Refills: 11 | Status: SHIPPED | OUTPATIENT
Start: 2018-07-27 | End: 2019-08-04 | Stop reason: SDUPTHER

## 2018-07-30 DIAGNOSIS — I10 CHRONIC HYPERTENSION: ICD-10-CM

## 2018-07-30 DIAGNOSIS — E11.42 DIABETIC POLYNEUROPATHY ASSOCIATED WITH TYPE 2 DIABETES MELLITUS (HCC): ICD-10-CM

## 2018-07-30 RX ORDER — ENALAPRIL MALEATE 20 MG/1
20 TABLET ORAL 2 TIMES DAILY
Qty: 90 TABLET | Refills: 7 | Status: SHIPPED | OUTPATIENT
Start: 2018-07-30 | End: 2018-08-08 | Stop reason: SDUPTHER

## 2018-08-06 ENCOUNTER — APPOINTMENT (OUTPATIENT)
Dept: LAB | Facility: CLINIC | Age: 48
End: 2018-08-06
Payer: COMMERCIAL

## 2018-08-06 DIAGNOSIS — E11.42 DIABETIC POLYNEUROPATHY ASSOCIATED WITH TYPE 2 DIABETES MELLITUS (HCC): ICD-10-CM

## 2018-08-06 DIAGNOSIS — I10 CHRONIC HYPERTENSION: ICD-10-CM

## 2018-08-06 DIAGNOSIS — E66.01 CLASS 3 SEVERE OBESITY DUE TO EXCESS CALORIES WITH SERIOUS COMORBIDITY AND BODY MASS INDEX (BMI) OF 40.0 TO 44.9 IN ADULT (HCC): ICD-10-CM

## 2018-08-06 DIAGNOSIS — E78.5 DYSLIPIDEMIA: ICD-10-CM

## 2018-08-06 LAB
ALBUMIN SERPL BCP-MCNC: 3.8 G/DL (ref 3.5–5)
ALP SERPL-CCNC: 91 U/L (ref 46–116)
ALT SERPL W P-5'-P-CCNC: 27 U/L (ref 12–78)
ANION GAP SERPL CALCULATED.3IONS-SCNC: 6 MMOL/L (ref 4–13)
AST SERPL W P-5'-P-CCNC: 10 U/L (ref 5–45)
BASOPHILS # BLD AUTO: 0.05 THOUSANDS/ΜL (ref 0–0.1)
BASOPHILS NFR BLD AUTO: 1 % (ref 0–1)
BILIRUB SERPL-MCNC: 0.59 MG/DL (ref 0.2–1)
BUN SERPL-MCNC: 11 MG/DL (ref 5–25)
CALCIUM SERPL-MCNC: 8.8 MG/DL (ref 8.3–10.1)
CHLORIDE SERPL-SCNC: 104 MMOL/L (ref 100–108)
CO2 SERPL-SCNC: 28 MMOL/L (ref 21–32)
CREAT SERPL-MCNC: 0.84 MG/DL (ref 0.6–1.3)
EOSINOPHIL # BLD AUTO: 0.27 THOUSAND/ΜL (ref 0–0.61)
EOSINOPHIL NFR BLD AUTO: 3 % (ref 0–6)
ERYTHROCYTE [DISTWIDTH] IN BLOOD BY AUTOMATED COUNT: 13.5 % (ref 11.6–15.1)
EST. AVERAGE GLUCOSE BLD GHB EST-MCNC: 157 MG/DL
GFR SERPL CREATININE-BSD FRML MDRD: 104 ML/MIN/1.73SQ M
GLUCOSE P FAST SERPL-MCNC: 131 MG/DL (ref 65–99)
HBA1C MFR BLD: 7.1 % (ref 4.2–6.3)
HCT VFR BLD AUTO: 48.8 % (ref 36.5–49.3)
HGB BLD-MCNC: 15.6 G/DL (ref 12–17)
IMM GRANULOCYTES # BLD AUTO: 0.04 THOUSAND/UL (ref 0–0.2)
IMM GRANULOCYTES NFR BLD AUTO: 0 % (ref 0–2)
LYMPHOCYTES # BLD AUTO: 2.49 THOUSANDS/ΜL (ref 0.6–4.47)
LYMPHOCYTES NFR BLD AUTO: 25 % (ref 14–44)
MCH RBC QN AUTO: 29.1 PG (ref 26.8–34.3)
MCHC RBC AUTO-ENTMCNC: 32 G/DL (ref 31.4–37.4)
MCV RBC AUTO: 91 FL (ref 82–98)
MONOCYTES # BLD AUTO: 0.57 THOUSAND/ΜL (ref 0.17–1.22)
MONOCYTES NFR BLD AUTO: 6 % (ref 4–12)
NEUTROPHILS # BLD AUTO: 6.43 THOUSANDS/ΜL (ref 1.85–7.62)
NEUTS SEG NFR BLD AUTO: 65 % (ref 43–75)
NRBC BLD AUTO-RTO: 0 /100 WBCS
PLATELET # BLD AUTO: 252 THOUSANDS/UL (ref 149–390)
PMV BLD AUTO: 11.2 FL (ref 8.9–12.7)
POTASSIUM SERPL-SCNC: 4 MMOL/L (ref 3.5–5.3)
PROT SERPL-MCNC: 7.8 G/DL (ref 6.4–8.2)
RBC # BLD AUTO: 5.37 MILLION/UL (ref 3.88–5.62)
SODIUM SERPL-SCNC: 138 MMOL/L (ref 136–145)
WBC # BLD AUTO: 9.85 THOUSAND/UL (ref 4.31–10.16)

## 2018-08-06 PROCEDURE — 80053 COMPREHEN METABOLIC PANEL: CPT

## 2018-08-06 PROCEDURE — 36415 COLL VENOUS BLD VENIPUNCTURE: CPT

## 2018-08-06 PROCEDURE — 85025 COMPLETE CBC W/AUTO DIFF WBC: CPT

## 2018-08-06 PROCEDURE — 83036 HEMOGLOBIN GLYCOSYLATED A1C: CPT

## 2018-08-08 ENCOUNTER — OFFICE VISIT (OUTPATIENT)
Dept: FAMILY MEDICINE CLINIC | Facility: CLINIC | Age: 48
End: 2018-08-08
Payer: COMMERCIAL

## 2018-08-08 VITALS
DIASTOLIC BLOOD PRESSURE: 82 MMHG | BODY MASS INDEX: 42.66 KG/M2 | HEART RATE: 82 BPM | TEMPERATURE: 97.6 F | WEIGHT: 315 LBS | SYSTOLIC BLOOD PRESSURE: 134 MMHG | RESPIRATION RATE: 16 BRPM | HEIGHT: 72 IN

## 2018-08-08 DIAGNOSIS — I10 CHRONIC HYPERTENSION: ICD-10-CM

## 2018-08-08 DIAGNOSIS — I50.20 MALIGNANT HYPERTENSION WITH SYSTOLIC CHF, NYHA CLASS 2 (HCC): ICD-10-CM

## 2018-08-08 DIAGNOSIS — E11.42 DIABETIC POLYNEUROPATHY ASSOCIATED WITH TYPE 2 DIABETES MELLITUS (HCC): Primary | ICD-10-CM

## 2018-08-08 DIAGNOSIS — I10 HTN (HYPERTENSION), MALIGNANT: ICD-10-CM

## 2018-08-08 DIAGNOSIS — E78.5 DYSLIPIDEMIA: ICD-10-CM

## 2018-08-08 DIAGNOSIS — I11.0 MALIGNANT HYPERTENSION WITH SYSTOLIC CHF, NYHA CLASS 2 (HCC): ICD-10-CM

## 2018-08-08 PROCEDURE — 99214 OFFICE O/P EST MOD 30 MIN: CPT | Performed by: FAMILY MEDICINE

## 2018-08-08 RX ORDER — ENALAPRIL MALEATE 20 MG/1
20 TABLET ORAL 2 TIMES DAILY
Qty: 90 TABLET | Refills: 0
Start: 2018-08-08 | End: 2020-01-28 | Stop reason: ALTCHOICE

## 2018-08-08 RX ORDER — CARVEDILOL 25 MG/1
25 TABLET ORAL 2 TIMES DAILY WITH MEALS
Qty: 180 TABLET | Refills: 0
Start: 2018-08-08 | End: 2020-01-28 | Stop reason: ALTCHOICE

## 2018-08-08 RX ORDER — AMLODIPINE BESYLATE 5 MG/1
5 TABLET ORAL DAILY
Qty: 90 TABLET | Refills: 0
Start: 2018-08-08 | End: 2020-01-28 | Stop reason: ALTCHOICE

## 2018-08-08 RX ORDER — SPIRONOLACTONE 25 MG/1
25 TABLET ORAL DAILY
Qty: 30 TABLET | Refills: 0
Start: 2018-08-08 | End: 2019-05-09

## 2018-08-08 NOTE — ASSESSMENT & PLAN NOTE
Lab Results   Component Value Date    HGBA1C 7 1 (H) 08/06/2018       No results for input(s): POCGLU in the last 72 hours      Blood Sugar Average: Last 72 hrs:

## 2018-08-08 NOTE — PROGRESS NOTES
Assessment/Plan:    Problem List Items Addressed This Visit     Diabetic polyneuropathy associated with type 2 diabetes mellitus ([facility] [address] ) - Primary     Lab Results   Component Value Date    HGBA1C 7 1 (H) 08/06/2018       No results for input(s): POCGLU in the last 72 hours  Blood Sugar Average: Last 72 hrs:           Relevant Orders    CBC and differential    Comprehensive metabolic panel    Hemoglobin A1C    Lipid Panel with Direct LDL reflex    RESOLVED: Chronic hypertension    Relevant Medications    spironolactone (ALDACTONE) 25 mg tablet    carvedilol (COREG) 25 mg tablet    amLODIPine (NORVASC) 5 mg tablet    enalapril (VASOTEC) 20 mg tablet    Other Relevant Orders    CBC and differential    Comprehensive metabolic panel    Lipid Panel with Direct LDL reflex    Dyslipidemia    Relevant Orders    CBC and differential    Comprehensive metabolic panel    Lipid Panel with Direct LDL reflex    Malignant hypertension with systolic CHF, NYHA class 2 (HCC)     bp is stable         Relevant Medications    spironolactone (ALDACTONE) 25 mg tablet    carvedilol (COREG) 25 mg tablet    amLODIPine (NORVASC) 5 mg tablet    enalapril (VASOTEC) 20 mg tablet    Other Relevant Orders    CBC and differential    Comprehensive metabolic panel    Lipid Panel with Direct LDL reflex      Other Visit Diagnoses     HTN (hypertension), malignant        Relevant Medications    spironolactone (ALDACTONE) 25 mg tablet    carvedilol (COREG) 25 mg tablet    amLODIPine (NORVASC) 5 mg tablet    enalapril (VASOTEC) 20 mg tablet    Other Relevant Orders    CBC and differential    Comprehensive metabolic panel    Lipid Panel with Direct LDL reflex          There are no Patient Instructions on file for this visit  Return in about 4 months (around 12/8/2018) for Annual physical     Subjective:      Patient ID: Jordan Jones is a 49 y o  male      Chief Complaint   Patient presents with    Follow-up     lab work and medications     Diabetes Pt is here for a three month follow up  Pt is doing well  No new issues at this time  No chest pain no sob  Pt denies polyuria no polydipsia      Diabetes   Pertinent negatives for hypoglycemia include no dizziness, headaches or speech difficulty  Pertinent negatives for diabetes include no blurred vision, no chest pain, no fatigue, no foot paresthesias, no foot ulcerations, no polydipsia, no polyphagia, no polyuria, no visual change, no weakness and no weight loss  The following portions of the patient's history were reviewed and updated as appropriate: allergies, current medications, past family history, past medical history, past social history, past surgical history and problem list     Review of Systems   Constitutional: Negative for activity change, appetite change, chills, diaphoresis, fatigue, fever, unexpected weight change and weight loss  HENT: Negative for congestion, dental problem, ear pain, mouth sores, sinus pain, sinus pressure, sore throat and trouble swallowing  Eyes: Negative for blurred vision, photophobia, discharge and itching  Respiratory: Negative for apnea, chest tightness and shortness of breath  Cardiovascular: Negative for chest pain, palpitations and leg swelling  Gastrointestinal: Negative for abdominal distention, abdominal pain, blood in stool, nausea and vomiting  Endocrine: Negative for cold intolerance, heat intolerance, polydipsia, polyphagia and polyuria  Genitourinary: Negative for difficulty urinating  Musculoskeletal: Negative for arthralgias  Skin: Negative for color change and wound  Neurological: Negative for dizziness, syncope, speech difficulty, weakness and headaches  Hematological: Negative for adenopathy  Psychiatric/Behavioral: Negative for agitation and behavioral problems           Current Outpatient Prescriptions   Medication Sig Dispense Refill    amLODIPine (NORVASC) 5 mg tablet Take 1 tablet (5 mg total) by mouth daily 90 tablet 0    B-D ULTRAFINE III SHORT PEN 31G X 8 MM MISC use as directed BY  each 2    carvedilol (COREG) 25 mg tablet Take 1 tablet (25 mg total) by mouth 2 (two) times a day with meals 180 tablet 0    Cholecalciferol (VITAMIN D) 2000 units tablet Take 1 tablet by mouth daily      Coenzyme Q10 (COQ-10) 200 MG CAPS Take by mouth      enalapril (VASOTEC) 20 mg tablet Take 1 tablet (20 mg total) by mouth 2 (two) times a day 90 tablet 0    FARXIGA 10 MG TABS take 1 tablet by mouth once daily 90 tablet 0    glucose blood test strip by In Vitro route      Insulin Pen Needle (B-D ULTRAFINE III SHORT PEN) 31G X 8 MM MISC by Does not apply route      metFORMIN (GLUCOPHAGE) 500 mg tablet Take 1 tablet (500 mg total) by mouth 2 (two) times a day with meals 180 tablet 1    NYSTATIN powder apply to affected area twice a day 60 g 1    ONETOUCH DELICA LANCETS FINE MISC by Does not apply route      rosuvastatin (CRESTOR) 20 MG tablet Take 1 tablet (20 mg total) by mouth daily 30 tablet 11    spironolactone (ALDACTONE) 25 mg tablet Take 1 tablet (25 mg total) by mouth daily 30 tablet 0    TRESIBA FLEXTOUCH injection pen 200 units/mL Inject 50 Units under the skin daily 3 pen 0    VASCEPA 1 g CAPS Take 2 capsules (2 g total) by mouth 2 (two) times a day 360 capsule 3     No current facility-administered medications for this visit  Objective:    /82   Pulse 82   Temp 97 6 °F (36 4 °C)   Resp 16   Ht 6' (1 829 m)   Wt (!) 145 kg (319 lb)   BMI 43 26 kg/m²        Physical Exam   Constitutional: He appears well-developed and well-nourished  No distress  HENT:   Head: Normocephalic and atraumatic  Right Ear: External ear normal    Left Ear: External ear normal    Nose: Nose normal    Mouth/Throat: Oropharynx is clear and moist  No oropharyngeal exudate  Eyes: EOM are normal  Pupils are equal, round, and reactive to light  Right eye exhibits no discharge  Left eye exhibits no discharge   No scleral icterus  Neck: No thyromegaly present  Cardiovascular: Normal rate and normal heart sounds  No murmur heard  Pulmonary/Chest: Effort normal and breath sounds normal  No respiratory distress  He has no wheezes  Abdominal: Soft  Bowel sounds are normal  He exhibits no distension and no mass  There is no tenderness  There is no rebound and no guarding  Musculoskeletal: Normal range of motion  Neurological: He is alert  He displays normal reflexes  Coordination normal    Skin: Skin is warm and dry  No rash noted  He is not diaphoretic  No erythema  Psychiatric: He has a normal mood and affect  His behavior is normal    Nursing note and vitals reviewed             Recent Results (from the past 672 hour(s))   Comprehensive metabolic panel    Collection Time: 08/06/18  9:16 AM   Result Value Ref Range    Sodium 138 136 - 145 mmol/L    Potassium 4 0 3 5 - 5 3 mmol/L    Chloride 104 100 - 108 mmol/L    CO2 28 21 - 32 mmol/L    Anion Gap 6 4 - 13 mmol/L    BUN 11 5 - 25 mg/dL    Creatinine 0 84 0 60 - 1 30 mg/dL    Glucose, Fasting 131 (H) 65 - 99 mg/dL    Calcium 8 8 8 3 - 10 1 mg/dL    AST 10 5 - 45 U/L    ALT 27 12 - 78 U/L    Alkaline Phosphatase 91 46 - 116 U/L    Total Protein 7 8 6 4 - 8 2 g/dL    Albumin 3 8 3 5 - 5 0 g/dL    Total Bilirubin 0 59 0 20 - 1 00 mg/dL    eGFR 104 ml/min/1 73sq m   CBC and differential    Collection Time: 08/06/18  9:16 AM   Result Value Ref Range    WBC 9 85 4 31 - 10 16 Thousand/uL    RBC 5 37 3 88 - 5 62 Million/uL    Hemoglobin 15 6 12 0 - 17 0 g/dL    Hematocrit 48 8 36 5 - 49 3 %    MCV 91 82 - 98 fL    MCH 29 1 26 8 - 34 3 pg    MCHC 32 0 31 4 - 37 4 g/dL    RDW 13 5 11 6 - 15 1 %    MPV 11 2 8 9 - 12 7 fL    Platelets 644 623 - 755 Thousands/uL    nRBC 0 /100 WBCs    Neutrophils Relative 65 43 - 75 %    Immat GRANS % 0 0 - 2 %    Lymphocytes Relative 25 14 - 44 %    Monocytes Relative 6 4 - 12 %    Eosinophils Relative 3 0 - 6 %    Basophils Relative 1 0 - 1 %    Neutrophils Absolute 6 43 1 85 - 7 62 Thousands/µL    Immature Grans Absolute 0 04 0 00 - 0 20 Thousand/uL    Lymphocytes Absolute 2 49 0 60 - 4 47 Thousands/µL    Monocytes Absolute 0 57 0 17 - 1 22 Thousand/µL    Eosinophils Absolute 0 27 0 00 - 0 61 Thousand/µL    Basophils Absolute 0 05 0 00 - 0 10 Thousands/µL   HEMOGLOBIN A1C W/ EAG ESTIMATION    Collection Time: 08/06/18  9:16 AM   Result Value Ref Range    Hemoglobin A1C 7 1 (H) 4 2 - 6 3 %     mg/dl         Denzel Calle DO

## 2018-08-23 PROCEDURE — 3072F LOW RISK FOR RETINOPATHY: CPT | Performed by: FAMILY MEDICINE

## 2018-08-28 ENCOUNTER — OFFICE VISIT (OUTPATIENT)
Dept: CARDIOLOGY CLINIC | Facility: CLINIC | Age: 48
End: 2018-08-28
Payer: COMMERCIAL

## 2018-08-28 VITALS
BODY MASS INDEX: 44.1 KG/M2 | HEIGHT: 71 IN | OXYGEN SATURATION: 98 % | HEART RATE: 82 BPM | WEIGHT: 315 LBS | DIASTOLIC BLOOD PRESSURE: 82 MMHG | SYSTOLIC BLOOD PRESSURE: 130 MMHG

## 2018-08-28 DIAGNOSIS — G47.33 OBSTRUCTIVE SLEEP APNEA: ICD-10-CM

## 2018-08-28 DIAGNOSIS — I87.2 VENOUS INSUFFICIENCY (CHRONIC) (PERIPHERAL): Primary | ICD-10-CM

## 2018-08-28 DIAGNOSIS — I10 CHRONIC HYPERTENSION: ICD-10-CM

## 2018-08-28 DIAGNOSIS — E11.42 DIABETIC POLYNEUROPATHY ASSOCIATED WITH TYPE 2 DIABETES MELLITUS (HCC): ICD-10-CM

## 2018-08-28 DIAGNOSIS — I11.0 MALIGNANT HYPERTENSION WITH SYSTOLIC CHF, NYHA CLASS 2 (HCC): ICD-10-CM

## 2018-08-28 DIAGNOSIS — E78.5 DYSLIPIDEMIA: ICD-10-CM

## 2018-08-28 DIAGNOSIS — E66.01 CLASS 3 SEVERE OBESITY DUE TO EXCESS CALORIES WITH SERIOUS COMORBIDITY AND BODY MASS INDEX (BMI) OF 40.0 TO 44.9 IN ADULT (HCC): ICD-10-CM

## 2018-08-28 DIAGNOSIS — I50.20 MALIGNANT HYPERTENSION WITH SYSTOLIC CHF, NYHA CLASS 2 (HCC): ICD-10-CM

## 2018-08-28 PROCEDURE — 99214 OFFICE O/P EST MOD 30 MIN: CPT | Performed by: INTERNAL MEDICINE

## 2018-08-28 PROCEDURE — 93000 ELECTROCARDIOGRAM COMPLETE: CPT | Performed by: INTERNAL MEDICINE

## 2018-08-28 NOTE — PROGRESS NOTES
Cardiology Follow Up  Megan Hinds  1970  [id number]  [facility] [address] [address] [phone]    1  Venous insufficiency (chronic) (peripheral)     2  Malignant hypertension with systolic CHF, NYHA class 2 ([address] )     3  Obstructive sleep apnea     4  Diabetic polyneuropathy associated with type 2 diabetes mellitus ([facility] [address] )     5  Chronic hypertension        Discussion/Plan:  Hld- controlled   -- Crestor 20 mg + coq10  -- vascepa 4000mg     Nonischemic HF- NYHA class 2 on exam  Euvolemia  EF improved to 50-55%  bp optimized  -- salt restriction  -- carvedilol 25mg bid  -- enalapril 20mg bid  -- aldosterone 25mg daily      Malignant Htn- controlled-- carvedillol 25mg-- enalapril + aldosterone-+ amlodipine 5mg - low salt dietA    trial flutter- typical  s/p ablation  sotalol d/c  Resolved    DM2- controlled    KYA- compliant with CPAP  Discussed wt loss    Rtc- one year      Interval History:  37 yo hx nonischemic heart failure with recovery of EF from 35-55%, obesity, dm2 on metformin, typical atrial flutter s/p successful ablation presents for follow-up  No symptoms of heart failure  No exercising much  Has had some weight gain  Diet is not optimized  Taking his medications including cholesterol  4/5:He has had no palpitations, shortness of breath, lower from edema  He is trying an exercise program  He is trying to improve his diet  We discussed in detail his advanced lipid panel  All his questions were answered  7/10: Denies having any return of palpitations  No dizziness or light-headness  HAs had trouble with his weights  Likes carbs  Triglycerides are not at goal  Compliant with meds  no chest pain  08/20/2018:  He denies having any recurrence of arrhythmias  He is compliant with his medications    He has not yet started structured exercise program   However he is starting with his wife  He denies having dizziness or lightheadedness  He denies having any lower extremity edema  Patient Active Problem List   Diagnosis    Diabetic polyneuropathy associated with type 2 diabetes mellitus ([place] [address] )    Status post catheter ablation of atrial flutter    Dyslipidemia    Heart disease    Malignant hypertension with systolic CHF, NYHA class 2 ([place] [address] )    Venous insufficiency (chronic) (peripheral)    Obstructive sleep apnea    Class 3 obesity in adult     Past Medical History:   Diagnosis Date    Acute bacterial prostatitis 09/28/2005    Last Assessed: 9/28/2005     Atrial flutter ([place] [address] )     Last Assessed: 7/7/2017     Cellulitis of toe of right foot     Last Assessed: 3/10/2017     Dermatophytosis of groin     Last Assessed: 3/21/2016     Hypertension     Hypokalemia     Last Assessed: 8/31/2015     Obesity     Last Assessed: 11/30/2015     Onychomycosis     Last Assessed: 2/15/2017     Sleep apnea     Tinea pedis of both feet     Last Assessed: 9/6/2016     Type 2 diabetes mellitus ([place] [address] )      Social History     Social History    Marital status: /Civil Union     Spouse name: N/A    Number of children: N/A    Years of education: N/A     Occupational History    Not on file       Social History Main Topics    Smoking status: Former Smoker     Packs/day: 1 00     Years: 11 00     Quit date: 2015    Smokeless tobacco: Never Used    Alcohol use Yes      Comment: ! drink/month- occasional alcohol use     Drug use: No    Sexual activity: Not on file     Other Topics Concern    Not on file     Social History Narrative    Sleeps 6-7 hours a day     Exercise: Walking           Family History   Problem Relation Age of Onset    Arthritis Mother     Arrhythmia Mother     Hypertension Mother     Anxiety disorder Father     Arthritis Father     Atrial fibrillation Father     Hypertension Father     Other Father         Impaired cognition     Mental illness Neg Hx      Past Surgical History:   Procedure Laterality Date    CARDIAC SURGERY      flutter repair    VASECTOMY  2005    Last Assessed: 5/28/2015        Current Outpatient Prescriptions:     amLODIPine (NORVASC) 5 mg tablet, Take 1 tablet (5 mg total) by mouth daily, Disp: 90 tablet, Rfl: 0    B-D ULTRAFINE III SHORT PEN 31G X 8 MM MISC, use as directed BY MD, Disp: 100 each, Rfl: 2    carvedilol (COREG) 25 mg tablet, Take 1 tablet (25 mg total) by mouth 2 (two) times a day with meals, Disp: 180 tablet, Rfl: 0    Cholecalciferol (VITAMIN D) 2000 units tablet, Take 1 tablet by mouth daily, Disp: , Rfl:     Coenzyme Q10 (COQ-10) 200 MG CAPS, Take by mouth, Disp: , Rfl:     enalapril (VASOTEC) 20 mg tablet, Take 1 tablet (20 mg total) by mouth 2 (two) times a day, Disp: 90 tablet, Rfl: 0    FARXIGA 10 MG TABS, take 1 tablet by mouth once daily, Disp: 90 tablet, Rfl: 0    glucose blood test strip, by In Vitro route, Disp: , Rfl:     Insulin Pen Needle (B-D ULTRAFINE III SHORT PEN) 31G X 8 MM MISC, by Does not apply route, Disp: , Rfl:     metFORMIN (GLUCOPHAGE) 500 mg tablet, Take 1 tablet (500 mg total) by mouth 2 (two) times a day with meals, Disp: 180 tablet, Rfl: 1    NYSTATIN powder, apply to affected area twice a day, Disp: 60 g, Rfl: 1    ONETOUCH DELICA LANCETS FINE MISC, by Does not apply route, Disp: , Rfl:     rosuvastatin (CRESTOR) 20 MG tablet, Take 1 tablet (20 mg total) by mouth daily, Disp: 30 tablet, Rfl: 11    spironolactone (ALDACTONE) 25 mg tablet, Take 1 tablet (25 mg total) by mouth daily, Disp: 30 tablet, Rfl: 0    TRESIBA FLEXTOUCH injection pen 200 units/mL, Inject 50 Units under the skin daily, Disp: 3 pen, Rfl: 0    VASCEPA 1 g CAPS, Take 2 capsules (2 g total) by mouth 2 (two) times a day, Disp: 360 capsule, Rfl: 3  No Known Allergies    Review of Systems:  Review of Systems   Constitutional: Negative  HENT: Negative  Eyes: Negative  Respiratory: Negative  Cardiovascular: Negative  Gastrointestinal: Negative  Endocrine: Negative  Genitourinary: Negative  Musculoskeletal: Negative  Skin: Negative  Allergic/Immunologic: Negative  Neurological: Negative  Hematological: Negative  Psychiatric/Behavioral: Negative  Vitals:    08/28/18 0817   BP: 130/82   BP Location: Left arm   Patient Position: Sitting   Cuff Size: Standard   Pulse: 82   SpO2: 98%   Weight: (!) 145 kg (320 lb)   Height: 5' 11" (1 803 m)     Physical Exam:  Physical Exam   Constitutional: He is oriented to person, place, and time  He appears well-developed and well-nourished  No distress  HENT:   Head: Normocephalic and atraumatic  Right Ear: External ear normal    Left Ear: External ear normal    Eyes: Conjunctivae are normal  Pupils are equal, round, and reactive to light  Right eye exhibits no discharge  Left eye exhibits no discharge  No scleral icterus  Neck: Normal range of motion  Neck supple  No JVD present  No tracheal deviation present  No thyromegaly present  Cardiovascular: Normal rate and regular rhythm  Exam reveals no gallop and no friction rub  No murmur heard  Pulmonary/Chest: Effort normal and breath sounds normal  No stridor  No respiratory distress  He has no wheezes  He has no rales  He exhibits no tenderness  Abdominal: Soft  Bowel sounds are normal  He exhibits distension  He exhibits no mass  There is no tenderness  There is no rebound and no guarding  Musculoskeletal: Normal range of motion  He exhibits no edema, tenderness or deformity  Neurological: He is alert and oriented to person, place, and time  He has normal reflexes  No cranial nerve deficit  He exhibits normal muscle tone  Coordination normal    Skin: Skin is warm and dry  No rash noted  He is not diaphoretic  No erythema  No pallor  Psychiatric: He has a normal mood and affect   His behavior is normal  Judgment and thought content normal        Labs:     Lab Results Component Value Date    WBC 9 85 08/06/2018    HGB 15 6 08/06/2018    HCT 48 8 08/06/2018    MCV 91 08/06/2018     08/06/2018     Lab Results   Component Value Date     08/06/2018    K 4 0 08/06/2018     08/06/2018    CO2 28 08/06/2018    BUN 11 08/06/2018    CREATININE 0 84 08/06/2018    GLUCOSE 133 (H) 11/04/2017    GLUF 131 (H) 08/06/2018    CALCIUM 8 8 08/06/2018    AST 10 08/06/2018    ALT 27 08/06/2018    ALKPHOS 91 08/06/2018    PROT 7 1 11/04/2017    BILITOT 0 5 11/04/2017    EGFR 104 08/06/2018     Lab Results   Component Value Date    CHOL 115 07/06/2017    CHOL 156 10/22/2016    CHOL 121 03/16/2016     Lab Results   Component Value Date    HDL 29 (L) 04/30/2018    HDL 32 (L) 07/06/2017    HDL 32 (L) 10/22/2016     Lab Results   Component Value Date    LDLCALC 52 07/06/2017    LDLCALC 91 10/22/2016    LDLCALC 62 03/16/2016     Lab Results   Component Value Date    TRIG 135 04/30/2018    TRIG 156 (H) 07/06/2017    TRIG 167 (H) 10/22/2016     Lab Results   Component Value Date    HGBA1C 7 1 (H) 08/06/2018       Imaging & Testing   I have personally reviewed pertinent reports  EKG: Personally reviewed  Normal sinus rhythm  QRS 90  NH interval 150  QTC of 400    Cardiac testing:   No results found for this or any previous visit  Dwayne Lyn  Please call with any questions or suggestions    A description of the counseling:   Goals and Barriers:  Patient's ability to self care:  Medication side effect reviewed with patient in detail and all their questions answered  "This note has been constructed using a voice recognition system  Therefore there may be syntax, spelling, and/or grammatical errors   Please call if you have any questions  "

## 2018-08-30 LAB
LEFT EYE DIABETIC RETINOPATHY: NORMAL
RIGHT EYE DIABETIC RETINOPATHY: NORMAL

## 2018-08-30 PROCEDURE — 3072F LOW RISK FOR RETINOPATHY: CPT | Performed by: FAMILY MEDICINE

## 2018-09-23 DIAGNOSIS — E11.42 DIABETIC POLYNEUROPATHY ASSOCIATED WITH TYPE 2 DIABETES MELLITUS (HCC): ICD-10-CM

## 2018-09-24 RX ORDER — DAPAGLIFLOZIN 10 MG/1
TABLET, FILM COATED ORAL
Qty: 90 TABLET | Refills: 1 | Status: SHIPPED | OUTPATIENT
Start: 2018-09-24 | End: 2019-04-23 | Stop reason: SDUPTHER

## 2018-09-28 DIAGNOSIS — B35.6 JOCK ITCH: ICD-10-CM

## 2018-09-28 RX ORDER — NYSTATIN 100000 [USP'U]/G
POWDER TOPICAL
Qty: 60 G | Refills: 1 | Status: SHIPPED | OUTPATIENT
Start: 2018-09-28 | End: 2019-04-02 | Stop reason: SDUPTHER

## 2018-11-20 DIAGNOSIS — E11.42 DIABETIC POLYNEUROPATHY ASSOCIATED WITH TYPE 2 DIABETES MELLITUS (HCC): ICD-10-CM

## 2018-11-21 RX ORDER — INSULIN DEGLUDEC 200 U/ML
INJECTION, SOLUTION SUBCUTANEOUS
Qty: 9 ML | Refills: 7 | Status: SHIPPED | OUTPATIENT
Start: 2018-11-21 | End: 2019-09-06 | Stop reason: SDUPTHER

## 2018-11-27 DIAGNOSIS — I50.20 SYSTOLIC HEART FAILURE (HCC): ICD-10-CM

## 2018-11-27 RX ORDER — SPIRONOLACTONE 25 MG/1
TABLET ORAL
Qty: 30 TABLET | Refills: 5 | Status: SHIPPED | OUTPATIENT
Start: 2018-11-27 | End: 2019-07-04 | Stop reason: SDUPTHER

## 2018-12-04 DIAGNOSIS — E11.42 DIABETIC POLYNEUROPATHY ASSOCIATED WITH TYPE 2 DIABETES MELLITUS (HCC): ICD-10-CM

## 2018-12-08 LAB
ALBUMIN SERPL-MCNC: 4.2 G/DL (ref 3.6–5.1)
ALBUMIN/GLOB SERPL: 1.4 (CALC) (ref 1–2.5)
ALP SERPL-CCNC: 96 U/L (ref 40–115)
ALT SERPL-CCNC: 20 U/L (ref 9–46)
AST SERPL-CCNC: 13 U/L (ref 10–40)
BASOPHILS # BLD AUTO: 43 CELLS/UL (ref 0–200)
BASOPHILS NFR BLD AUTO: 0.5 %
BILIRUB SERPL-MCNC: 0.7 MG/DL (ref 0.2–1.2)
BUN SERPL-MCNC: 13 MG/DL (ref 7–25)
BUN/CREAT SERPL: ABNORMAL (CALC) (ref 6–22)
CALCIUM SERPL-MCNC: 9.4 MG/DL (ref 8.6–10.3)
CHLORIDE SERPL-SCNC: 101 MMOL/L (ref 98–110)
CHOLEST SERPL-MCNC: 110 MG/DL
CHOLEST/HDLC SERPL: 3.8 (CALC)
CO2 SERPL-SCNC: 30 MMOL/L (ref 20–32)
CREAT SERPL-MCNC: 0.8 MG/DL (ref 0.6–1.35)
EOSINOPHIL # BLD AUTO: 230 CELLS/UL (ref 15–500)
EOSINOPHIL NFR BLD AUTO: 2.7 %
ERYTHROCYTE [DISTWIDTH] IN BLOOD BY AUTOMATED COUNT: 13.5 % (ref 11–15)
GLOBULIN SER CALC-MCNC: 2.9 G/DL (CALC) (ref 1.9–3.7)
GLUCOSE SERPL-MCNC: 137 MG/DL (ref 65–99)
HBA1C MFR BLD: 7.3 % OF TOTAL HGB
HCT VFR BLD AUTO: 50.1 % (ref 38.5–50)
HDLC SERPL-MCNC: 29 MG/DL
HGB BLD-MCNC: 16.7 G/DL (ref 13.2–17.1)
LDLC SERPL CALC-MCNC: 59 MG/DL (CALC)
LYMPHOCYTES # BLD AUTO: 2321 CELLS/UL (ref 850–3900)
LYMPHOCYTES NFR BLD AUTO: 27.3 %
MCH RBC QN AUTO: 29.5 PG (ref 27–33)
MCHC RBC AUTO-ENTMCNC: 33.3 G/DL (ref 32–36)
MCV RBC AUTO: 88.5 FL (ref 80–100)
MONOCYTES # BLD AUTO: 544 CELLS/UL (ref 200–950)
MONOCYTES NFR BLD AUTO: 6.4 %
NEUTROPHILS # BLD AUTO: 5364 CELLS/UL (ref 1500–7800)
NEUTROPHILS NFR BLD AUTO: 63.1 %
NONHDLC SERPL-MCNC: 81 MG/DL (CALC)
PLATELET # BLD AUTO: 235 THOUSAND/UL (ref 140–400)
PMV BLD REES-ECKER: 10.8 FL (ref 7.5–12.5)
POTASSIUM SERPL-SCNC: 4.6 MMOL/L (ref 3.5–5.3)
PROT SERPL-MCNC: 7.1 G/DL (ref 6.1–8.1)
RBC # BLD AUTO: 5.66 MILLION/UL (ref 4.2–5.8)
SL AMB EGFR AFRICAN AMERICAN: 122 ML/MIN/1.73M2
SL AMB EGFR NON AFRICAN AMERICAN: 105 ML/MIN/1.73M2
SODIUM SERPL-SCNC: 139 MMOL/L (ref 135–146)
TRIGL SERPL-MCNC: 140 MG/DL
WBC # BLD AUTO: 8.5 THOUSAND/UL (ref 3.8–10.8)

## 2018-12-12 ENCOUNTER — OFFICE VISIT (OUTPATIENT)
Dept: FAMILY MEDICINE CLINIC | Facility: CLINIC | Age: 48
End: 2018-12-12
Payer: COMMERCIAL

## 2018-12-12 VITALS
RESPIRATION RATE: 18 BRPM | WEIGHT: 315 LBS | HEART RATE: 80 BPM | DIASTOLIC BLOOD PRESSURE: 82 MMHG | HEIGHT: 71 IN | BODY MASS INDEX: 44.1 KG/M2 | SYSTOLIC BLOOD PRESSURE: 120 MMHG | TEMPERATURE: 97.1 F

## 2018-12-12 DIAGNOSIS — Z00.00 WELL ADULT EXAM: Primary | ICD-10-CM

## 2018-12-12 DIAGNOSIS — Z23 NEED FOR VACCINATION: ICD-10-CM

## 2018-12-12 DIAGNOSIS — I50.20 MALIGNANT HYPERTENSION WITH SYSTOLIC CHF, NYHA CLASS 2 (HCC): ICD-10-CM

## 2018-12-12 DIAGNOSIS — E78.5 DYSLIPIDEMIA: ICD-10-CM

## 2018-12-12 DIAGNOSIS — E66.01 MORBID OBESITY (HCC): ICD-10-CM

## 2018-12-12 DIAGNOSIS — I51.9 HEART DISEASE: ICD-10-CM

## 2018-12-12 DIAGNOSIS — I11.0 MALIGNANT HYPERTENSION WITH SYSTOLIC CHF, NYHA CLASS 2 (HCC): ICD-10-CM

## 2018-12-12 DIAGNOSIS — E11.42 DIABETIC POLYNEUROPATHY ASSOCIATED WITH TYPE 2 DIABETES MELLITUS (HCC): ICD-10-CM

## 2018-12-12 PROBLEM — IMO0001 CLASS 3 OBESITY IN ADULT: Status: RESOLVED | Noted: 2018-05-29 | Resolved: 2018-12-12

## 2018-12-12 PROCEDURE — 99396 PREV VISIT EST AGE 40-64: CPT | Performed by: FAMILY MEDICINE

## 2018-12-12 PROCEDURE — 90471 IMMUNIZATION ADMIN: CPT

## 2018-12-12 PROCEDURE — 90686 IIV4 VACC NO PRSV 0.5 ML IM: CPT

## 2018-12-12 NOTE — PATIENT INSTRUCTIONS
Obesity   AMBULATORY CARE:   Obesity  is when your body mass index (BMI) is greater than 30  Your healthcare provider will use your height and weight to measure your BMI  The risks of obesity include  many health problems, such as injuries or physical disability  You may need tests to check for the following:  · Diabetes     · High blood pressure or high cholesterol     · Heart disease     · Gallbladder or liver disease     · Cancer of the colon, breast, prostate, liver, or kidney     · Sleep apnea     · Arthritis or gout  Seek care immediately if:   · You have a severe headache, confusion, or difficulty speaking  · You have weakness on one side of your body  · You have chest pain, sweating, or shortness of breath  Contact your healthcare provider if:   · You have symptoms of gallbladder or liver disease, such as pain in your upper abdomen  · You have knee or hip pain and discomfort while walking  · You have symptoms of diabetes, such as intense hunger and thirst, and frequent urination  · You have symptoms of sleep apnea, such as snoring or daytime sleepiness  · You have questions or concerns about your condition or care  Treatment for obesity  focuses on helping you lose weight to improve your health  Even a small decrease in BMI can reduce the risk for many health problems  Your healthcare provider will help you set a weight-loss goal   · Lifestyle changes  are the first step in treating obesity  These include making healthy food choices and getting regular physical activity  Your healthcare provider may suggest a weight-loss program that involves coaching, education, and therapy  · Medicine  may help you lose weight when it is used with a healthy diet and physical activity  · Surgery  can help you lose weight if you are very obese and have other health problems  There are several types of weight-loss surgery  Ask your healthcare provider for more information    Be successful losing weight:   · Set small, realistic goals  An example of a small goal is to walk for 20 minutes 5 days a week  Anther goal is to lose 5% of your body weight  · Tell friends, family members, and coworkers about your goals  and ask for their support  Ask a friend to lose weight with you, or join a weight-loss support group  · Identify foods or triggers that may cause you to overeat , and find ways to avoid them  Remove tempting high-calorie foods from your home and workplace  Place a bowl of fresh fruit on your kitchen counter  If stress causes you to eat, then find other ways to cope with stress  · Keep a diary to track what you eat and drink  Also write down how many minutes of physical activity you do each day  Weigh yourself once a week and record it in your diary  Eating changes: You will need to eat 500 to 1,000 fewer calories each day than you currently eat to lose 1 to 2 pounds a week  The following changes will help you cut calories:  · Eat smaller portions  Use small plates, no larger than 9 inches in diameter  Fill your plate half full of fruits and vegetables  Measure your food using measuring cups until you know what a serving size looks like  · Eat 3 meals and 1 or 2 snacks each day  Plan your meals in advance  Abida Loera and eat at home most of the time  Eat slowly  · Eat fruits and vegetables at every meal   They are low in calories and high in fiber, which makes you feel full  Do not add butter, margarine, or cream sauce to vegetables  Use herbs to season steamed vegetables  · Eat less fat and fewer fried foods  Eat more baked or grilled chicken and fish  These protein sources are lower in calories and fat than red meat  Limit fast food  Dress your salads with olive oil and vinegar instead of bottled dressing  · Limit the amount of sugar you eat  Do not drink sugary beverages  Limit alcohol  Activity changes:  Physical activity is good for your body in many ways   It helps you burn calories and build strong muscles  It decreases stress and depression, and improves your mood  It can also help you sleep better  Talk to your healthcare provider before you begin an exercise program   · Exercise for at least 30 minutes 5 days a week  Start slowly  Set aside time each day for physical activity that you enjoy and that is convenient for you  It is best to do both weight training and an activity that increases your heart rate, such as walking, bicycling, or swimming  · Find ways to be more active  Do yard work and housecleaning  Walk up the stairs instead of using elevators  Spend your leisure time going to events that require walking, such as outdoor festivals or fairs  This extra physical activity can help you lose weight and keep it off  Follow up with your healthcare provider as directed: You may need to meet with a dietitian  Write down your questions so you remember to ask them during your visits  © 2017 [employer] Information is for End User's use only and may not be sold, redistributed or otherwise used for commercial purposes  All illustrations and images included in CareNotes® are the copyrighted property of [employer]  or [facility]  The above information is an  only  It is not intended as medical advice for individual conditions or treatments  Talk to your doctor, nurse or pharmacist before following any medical regimen to see if it is safe and effective for you  Weight Management   AMBULATORY CARE:   Why it is important to manage your weight:  Being overweight increases your risk of health conditions such as heart disease, high blood pressure, type 2 diabetes, and certain types of cancer  It can also increase your risk for osteoarthritis, sleep apnea, and other respiratory problems  Aim for a slow, steady weight loss  Even a small amount of weight loss can lower your risk of health problems    How to lose weight safely:  A safe and healthy way to lose weight is to eat fewer calories and get regular exercise  You can lose up about 1 pound a week by decreasing the number of calories you eat by 500 calories each day  You can decrease calories by eating smaller portion sizes or by cutting out high-calorie foods  Read labels to find out how many calories are in the foods you eat  You can also burn calories with exercise such as walking, swimming, or biking  You will be more likely to keep weight off if you make these changes part of your lifestyle  Healthy meal plan for weight management:  A healthy meal plan includes a variety of foods, contains fewer calories, and helps you stay healthy  A healthy meal plan includes the following:  · Eat whole-grain foods more often  A healthy meal plan should contain fiber  Fiber is the part of grains, fruits, and vegetables that is not broken down by your body  Whole-grain foods are healthy and provide extra fiber in your diet  Some examples of whole-grain foods are whole-wheat breads and pastas, oatmeal, brown rice, and bulgur  · Eat a variety of vegetables every day  Include dark, leafy greens such as spinach, kale, raymon greens, and mustard greens  Eat yellow and orange vegetables such as carrots, sweet potatoes, and winter squash  · Eat a variety of fruits every day  Choose fresh or canned fruit (canned in its own juice or light syrup) instead of juice  Fruit juice has very little or no fiber  · Eat low-fat dairy foods  Drink fat-free (skim) milk or 1% milk  Eat fat-free yogurt and low-fat cottage cheese  Try low-fat cheeses such as mozzarella and other reduced-fat cheeses  · Choose meat and other protein foods that are low in fat  Choose beans or other legumes such as split peas or lentils  Choose fish, skinless poultry (chicken or turkey), or lean cuts of red meat (beef or pork)  Before you cook meat or poultry, cut off any visible fat  · Use less fat and oil  Try baking foods instead of frying them  Add less fat, such as margarine, sour cream, regular salad dressing and mayonnaise to foods  Eat fewer high-fat foods  Some examples of high-fat foods include french fries, doughnuts, ice cream, and cakes  · Eat fewer sweets  Limit foods and drinks that are high in sugar  This includes candy, cookies, regular soda, and sweetened drinks  Ways to decrease calories:   · Eat smaller portions  ¨ Use a small plate with smaller servings  ¨ Do not eat second helpings  ¨ When you eat at a restaurant, ask for a box and place half of your meal in the box before you eat  ¨ Share an entrée with someone else  · Replace high-calorie snacks with healthy, low-calorie snacks  ¨ Choose fresh fruit, vegetables, fat-free rice cakes, or air-popped popcorn instead of potato chips, nuts, or chocolate  ¨ Choose water or calorie-free drinks instead of soda or sweetened drinks  · Eat regular meals  Skipping meals can lead to overeating later in the day  Eat a healthy snack in place of a meal if you do not have time to eat a regular meal      · Do not shop for groceries when you are hungry  You may be more likely to make unhealthy food choices  Take a grocery list of healthy foods and shop after you have eaten  Exercise:  Exercise at least 30 minutes per day on most days of the week  Some examples of exercise include walking, biking, dancing, and swimming  You can also fit in more physical activity by taking the stairs instead of the elevator or parking farther away from stores  Ask your healthcare provider about the best exercise plan for you  Other things to consider as you try to lose weight:   · Be aware of situations that may give you the urge to overeat, such as eating while watching television  Find ways to avoid these situations  For example, read a book, go for a walk, or do crafts      · Meet with a weight loss support group or friends who are also trying to lose weight  This may help you stay motivated to continue working on your weight loss goals  © 2017 [employer] Information is for End User's use only and may not be sold, redistributed or otherwise used for commercial purposes  All illustrations and images included in CareNotes® are the copyrighted property of [employer]  or [employer]  The above information is an  only  It is not intended as medical advice for individual conditions or treatments  Talk to your doctor, nurse or pharmacist before following any medical regimen to see if it is safe and effective for you

## 2018-12-12 NOTE — PROGRESS NOTES
FAMILY PRACTICE HEALTH MAINTENANCE OFFICE VISIT  [facility]    NAME: Dayanara Vasquez  AGE: 51 y o  SEX: male  : 1970     DATE: 2018    Assessment and Plan     Problem List Items Addressed This Visit     Diabetic polyneuropathy associated with type 2 diabetes mellitus ([facility] )    Relevant Medications    metFORMIN (GLUCOPHAGE) 850 mg tablet    Other Relevant Orders    Hemoglobin A1C    Microalbumin / creatinine urine ratio    Comprehensive metabolic panel    Dyslipidemia    Heart disease    Malignant hypertension with systolic CHF, NYHA class 2 ([facility] )      Other Visit Diagnoses     Well adult exam    -  Primary    Need for vaccination        Relevant Orders    influenza vaccine, [id number], quadrivalent, 0 5 mL, preservative-free (SYRINGE, SINGLE-DOSE VIAL), for adult and pediatric patients 3 yr+ (AFLURIA, FLUARIX, FLULAVAL, FLUZONE)    Morbid obesity ([facility] )        Relevant Orders    Ambulatory referral to Bariatric Surgery    BMI 40 0-44 9, adult ([facility] )        Relevant Orders    Ambulatory referral to Bariatric Surgery            · Patient Counseling:   · Nutrition: Stressed importance of a well balanced diet, moderation of sodium/saturated fat, caloric balance and sufficient intake of fiber  · Exercise: Stressed the importance of regular exercise with a goal of 150 minutes per week  · Dental Health: Discussed daily flossing and brushing and regular dental visits   · Alcohol Use:  Recommended moderation of alcohol intake    · Immunizations reviewed yes  · Discussed benefits of screening yes  BMI Counseling: Body mass index is 44 27 kg/m²  Discussed with patient's BMI with him  The BMI is above average  BMI counseling and education was provided to the patient  Nutrition recommendations include reducing portion sizes and decreasing overall calorie intake  No Follow-up on file          Chief Complaint     Chief Complaint   Patient presents with    Physical Exam parvin        History of Present Illness     Pt is here for a full physical had labs done        Well Adult Physical   Patient here for a comprehensive physical exam       Diet and Physical Activity  Diet: well balanced diet  Weight concerns: Patient has class 3 obesity (BMI >40)  Exercise: intermittently      Depression Screen  PHQ-9 Depression Screening    PHQ-9:    Frequency of the following problems over the past two weeks:               General Health  Hearing: Normal:  bilateral  Vision: wears glasses  Dental: regular dental visits    Reproductive Health        The following portions of the patient's history were reviewed and updated as appropriate: allergies, current medications, past family history, past medical history, past social history, past surgical history and problem list     Review of Systems     Review of Systems   Constitutional: Negative for activity change, appetite change, chills, diaphoresis, fatigue, fever and unexpected weight change  HENT: Negative for congestion, dental problem, ear pain, mouth sores, sinus pain, sinus pressure, sore throat and trouble swallowing  Eyes: Negative for photophobia, discharge and itching  Respiratory: Negative for apnea, chest tightness and shortness of breath  Cardiovascular: Negative for chest pain, palpitations and leg swelling  Gastrointestinal: Negative for abdominal distention, abdominal pain, blood in stool, nausea and vomiting  Endocrine: Negative for cold intolerance, heat intolerance, polydipsia, polyphagia and polyuria  Genitourinary: Negative for difficulty urinating  Musculoskeletal: Negative for arthralgias  Skin: Negative for color change and wound  Neurological: Negative for dizziness, syncope, speech difficulty and headaches  Hematological: Negative for adenopathy  Psychiatric/Behavioral: Negative for agitation and behavioral problems         Past Medical History     Past Medical History:   Diagnosis Date    Acute bacterial prostatitis 09/28/2005    Last Assessed: 9/28/2005     Atrial flutter (HCC)     Last Assessed: 7/7/2017     Cellulitis of toe of right foot     Last Assessed: 3/10/2017     Dermatophytosis of groin     Last Assessed: 3/21/2016     Hypertension     Hypokalemia     Last Assessed: 8/31/2015     Obesity     Last Assessed: 11/30/2015     Onychomycosis     Last Assessed: 2/15/2017     Sleep apnea     Tinea pedis of both feet     Last Assessed: 9/6/2016     Type 2 diabetes mellitus ([facility] )        Past Surgical History     Past Surgical History:   Procedure Laterality Date    CARDIAC SURGERY      flutter repair    VASECTOMY  2005    Last Assessed: 5/28/2015        Social History     Social History     Social History    Marital status: /Civil Union     Spouse name: N/A    Number of children: N/A    Years of education: N/A     Social History Main Topics    Smoking status: Former Smoker     Packs/day: 1 00     Years: 11 00     Quit date: 2015    Smokeless tobacco: Never Used    Alcohol use Yes      Comment: ! drink/month- occasional alcohol use     Drug use: No    Sexual activity: Not Asked     Other Topics Concern    None     Social History Narrative    Sleeps 6-7 hours a day     Exercise: Walking            Family History     Family History   Problem Relation Age of Onset    Arthritis Mother     Arrhythmia Mother     Hypertension Mother     Anxiety disorder Father     Arthritis Father     Atrial fibrillation Father     Hypertension Father     Other Father         Impaired cognition     Mental illness Neg Hx        Current Medications       Current Outpatient Prescriptions:     amLODIPine (NORVASC) 5 mg tablet, Take 1 tablet (5 mg total) by mouth daily, Disp: 90 tablet, Rfl: 0    B-D ULTRAFINE III SHORT PEN 31G X 8 MM MISC, use as directed BY MD, Disp: 100 each, Rfl: 2    carvedilol (COREG) 25 mg tablet, Take 1 tablet (25 mg total) by mouth 2 (two) times a day with meals, Disp: 180 tablet, Rfl: 0    Cholecalciferol (VITAMIN D) 2000 units tablet, Take 1 tablet by mouth daily, Disp: , Rfl:     Coenzyme Q10 (COQ-10) 200 MG CAPS, Take by mouth, Disp: , Rfl:     enalapril (VASOTEC) 20 mg tablet, Take 1 tablet (20 mg total) by mouth 2 (two) times a day, Disp: 90 tablet, Rfl: 0    FARXIGA 10 MG TABS, take 1 tablet by mouth once daily, Disp: 90 tablet, Rfl: 1    glucose blood test strip, by In Vitro route, Disp: , Rfl:     Insulin Pen Needle (B-D ULTRAFINE III SHORT PEN) 31G X 8 MM MISC, by Does not apply route, Disp: , Rfl:     metFORMIN (GLUCOPHAGE) 850 mg tablet, Take 1 tablet (850 mg total) by mouth 2 (two) times a day with meals, Disp: 180 tablet, Rfl: 1    nystatin (MYCOSTATIN) powder, apply to affected area twice a day, Disp: 60 g, Rfl: 1    ONETOUCH DELICA LANCETS FINE MISC, by Does not apply route, Disp: , Rfl:     rosuvastatin (CRESTOR) 20 MG tablet, Take 1 tablet (20 mg total) by mouth daily, Disp: 30 tablet, Rfl: 11    spironolactone (ALDACTONE) 25 mg tablet, Take 1 tablet (25 mg total) by mouth daily, Disp: 30 tablet, Rfl: 0    TRESIBA FLEXTOUCH 200 units/mL CONCENTRATED U-200 injection pen, inject 45 units subcutaneously at bedtime, Disp: 9 mL, Rfl: 7    VASCEPA 1 g CAPS, Take 2 capsules (2 g total) by mouth 2 (two) times a day, Disp: 360 capsule, Rfl: 3    spironolactone (ALDACTONE) 25 mg tablet, take 1 tablet by mouth once daily (Patient not taking: Reported on 12/12/2018), Disp: 30 tablet, Rfl: 5     Allergies     No Known Allergies    Objective     /82   Pulse 80   Temp (!) 97 1 °F (36 2 °C)   Resp 18   Ht 5' 11" (1 803 m)   Wt (!) 144 kg (317 lb 6 4 oz)   BMI 44 27 kg/m²      Physical Exam   Constitutional: He appears well-developed and well-nourished  No distress  HENT:   Head: Normocephalic and atraumatic     Right Ear: External ear normal    Left Ear: External ear normal    Nose: Nose normal    Mouth/Throat: Oropharynx is clear and moist  No oropharyngeal exudate  Eyes: Pupils are equal, round, and reactive to light  EOM are normal  Right eye exhibits no discharge  Left eye exhibits no discharge  No scleral icterus  Neck: No thyromegaly present  Cardiovascular: Normal rate and normal heart sounds  No murmur heard  Pulmonary/Chest: Effort normal and breath sounds normal  No respiratory distress  He has no wheezes  Abdominal: Soft  Bowel sounds are normal  He exhibits no distension and no mass  There is no tenderness  There is no rebound and no guarding  Musculoskeletal: Normal range of motion  Neurological: He is alert  He displays normal reflexes  Coordination normal    Skin: Skin is warm and dry  No rash noted  He is not diaphoretic  No erythema  Psychiatric: He has a normal mood and affect  His behavior is normal    Nursing note and vitals reviewed           Visual Acuity Screening    Right eye Left eye Both eyes   Without correction:      With correction: 20/13 20/13 20/13       Health Maintenance     Health Maintenance   Topic Date Due    Pneumococcal PPSV23 Medium Risk Adult (1 of 1 - PPSV23) 08/10/1988    INFLUENZA VACCINE  07/01/2018    URINE MICROALBUMIN  04/30/2019    Depression Screening PHQ  05/02/2019    Diabetic Foot Exam  05/02/2019    HEMOGLOBIN A1C  06/07/2019    DM Eye Exam  08/30/2019    DTaP,Tdap,and Td Vaccines (2 - Td) 06/24/2026     Immunization History   Administered Date(s) Administered    Influenza Quadrivalent, 6-35 Months IM 11/30/2015, 10/25/2016, 11/08/2017    Pneumococcal Conjugate 13-Valent 11/30/2015    Tdap 06/24/2016       [facility]     Recent Results (from the past 672 hour(s))   Lipid Panel with Direct LDL reflex    Collection Time: 12/07/18  7:59 AM   Result Value Ref Range    Total Cholesterol 110 <200 mg/dL    HDL 29 (L) >40 mg/dL    Triglycerides 140 <150 mg/dL    LDL Direct 59 mg/dL (calc)    Chol HDLC Ratio 3 8 <5 0 (calc)    Non-HDL Cholesterol 81 <130 mg/dL (calc)   Comprehensive metabolic panel    Collection Time: 12/07/18  7:59 AM   Result Value Ref Range    Glucose, Random 137 (H) 65 - 99 mg/dL    BUN 13 7 - 25 mg/dL    Creatinine 0 80 0 60 - 1 35 mg/dL    eGFR Non  105 > OR = 60 mL/min/1 73m2    SL AMB EGFR  122 > OR = 60 mL/min/1 73m2    SL AMB BUN/CREATININE RATIO NOT APPLICABLE 6 - 22 (calc)    Sodium 139 135 - 146 mmol/L    Potassium 4 6 3 5 - 5 3 mmol/L    Chloride 101 98 - 110 mmol/L    CO2 30 20 - 32 mmol/L    SL AMB CALCIUM 9 4 8 6 - 10 3 mg/dL    SL AMB PROTEIN, TOTAL 7 1 6 1 - 8 1 g/dL    Albumin 4 2 3 6 - 5 1 g/dL    Globulin 2 9 1 9 - 3 7 g/dL (calc)    Albumin/Globulin Ratio 1 4 1 0 - 2 5 (calc)    TOTAL BILIRUBIN 0 7 0 2 - 1 2 mg/dL    Alkaline Phosphatase 96 40 - 115 U/L    SL AMB AST 13 10 - 40 U/L    SL AMB ALT 20 9 - 46 U/L   CBC and differential    Collection Time: 12/07/18  7:59 AM   Result Value Ref Range    White Blood Cell Count 8 5 3 8 - 10 8 Thousand/uL    Red Blood Cell Count 5 66 4 20 - 5 80 Million/uL    Hemoglobin 16 7 13 2 - 17 1 g/dL    HCT 50 1 (H) 38 5 - 50 0 %    MCV 88 5 80 0 - 100 0 fL    MCH 29 5 27 0 - 33 0 pg    MCHC 33 3 32 0 - 36 0 g/dL    RDW 13 5 11 0 - 15 0 %    Platelet Count 151 759 - 400 Thousand/uL    SL AMB MPV 10 8 7 5 - 12 5 fL    Neutrophils (Absolute) 5,364 1,500 - 7,800 cells/uL    Lymphocytes (Absolute) 2,321 850 - 3,900 cells/uL    Monocytes (Absolute) 544 200 - 950 cells/uL    Eosinophils (Absolute) 230 15 - 500 cells/uL    Basophils ABS 43 0 - 200 cells/uL    Neutrophils 63 1 %    Lymphocytes 27 3 %    Monocytes 6 4 %    Eosinophils 2 7 %    Basophils PCT 0 5 %   Hemoglobin A1c (w/out EAG)    Collection Time: 12/07/18  7:59 AM   Result Value Ref Range    Hemoglobin A1C 7 3 (H) <5 7 % of total Hgb         BMI Counseling: Body mass index is 44 27 kg/m²  Discussed the patient's BMI with him  The BMI is above average   BMI counseling and education was provided to the patient  Nutrition recommendations include reducing portion sizes

## 2019-03-13 PROBLEM — E66.01 MORBID OBESITY WITH BODY MASS INDEX (BMI) OF 40.0 TO 44.9 IN ADULT (HCC): Status: ACTIVE | Noted: 2019-03-13

## 2019-04-02 DIAGNOSIS — B35.6 JOCK ITCH: ICD-10-CM

## 2019-04-02 RX ORDER — NYSTATIN 100000 [USP'U]/G
POWDER TOPICAL
Qty: 60 G | Refills: 1 | Status: SHIPPED | OUTPATIENT
Start: 2019-04-02 | End: 2019-05-09 | Stop reason: ALTCHOICE

## 2019-04-23 DIAGNOSIS — E11.42 DIABETIC POLYNEUROPATHY ASSOCIATED WITH TYPE 2 DIABETES MELLITUS (HCC): ICD-10-CM

## 2019-04-23 RX ORDER — DAPAGLIFLOZIN 10 MG/1
TABLET, FILM COATED ORAL
Qty: 90 TABLET | Refills: 1 | Status: SHIPPED | OUTPATIENT
Start: 2019-04-23 | End: 2020-01-03

## 2019-05-07 LAB
ALBUMIN SERPL-MCNC: 4.1 G/DL (ref 3.6–5.1)
ALBUMIN/CREAT UR: 32 MCG/MG CREAT
ALBUMIN/GLOB SERPL: 1.4 (CALC) (ref 1–2.5)
ALP SERPL-CCNC: 90 U/L (ref 40–115)
ALT SERPL-CCNC: 24 U/L (ref 9–46)
AST SERPL-CCNC: 15 U/L (ref 10–40)
BILIRUB SERPL-MCNC: 0.7 MG/DL (ref 0.2–1.2)
BUN SERPL-MCNC: 13 MG/DL (ref 7–25)
BUN/CREAT SERPL: ABNORMAL (CALC) (ref 6–22)
CALCIUM SERPL-MCNC: 9.1 MG/DL (ref 8.6–10.3)
CHLORIDE SERPL-SCNC: 103 MMOL/L (ref 98–110)
CO2 SERPL-SCNC: 29 MMOL/L (ref 20–32)
CREAT SERPL-MCNC: 0.82 MG/DL (ref 0.6–1.35)
CREAT UR-MCNC: 151 MG/DL (ref 20–320)
GLOBULIN SER CALC-MCNC: 2.9 G/DL (CALC) (ref 1.9–3.7)
GLUCOSE SERPL-MCNC: 144 MG/DL (ref 65–99)
HBA1C MFR BLD: 7.7 % OF TOTAL HGB
MICROALBUMIN UR-MCNC: 4.8 MG/DL
POTASSIUM SERPL-SCNC: 4.8 MMOL/L (ref 3.5–5.3)
PROT SERPL-MCNC: 7 G/DL (ref 6.1–8.1)
SL AMB EGFR AFRICAN AMERICAN: 120 ML/MIN/1.73M2
SL AMB EGFR NON AFRICAN AMERICAN: 104 ML/MIN/1.73M2
SODIUM SERPL-SCNC: 139 MMOL/L (ref 135–146)

## 2019-05-09 ENCOUNTER — OFFICE VISIT (OUTPATIENT)
Dept: FAMILY MEDICINE CLINIC | Facility: CLINIC | Age: 49
End: 2019-05-09
Payer: COMMERCIAL

## 2019-05-09 VITALS
HEIGHT: 71 IN | BODY MASS INDEX: 44.1 KG/M2 | HEART RATE: 84 BPM | TEMPERATURE: 97.4 F | WEIGHT: 315 LBS | DIASTOLIC BLOOD PRESSURE: 70 MMHG | SYSTOLIC BLOOD PRESSURE: 120 MMHG | RESPIRATION RATE: 18 BRPM

## 2019-05-09 DIAGNOSIS — I50.20 MALIGNANT HYPERTENSION WITH SYSTOLIC CHF, NYHA CLASS 2 (HCC): ICD-10-CM

## 2019-05-09 DIAGNOSIS — Z23 NEED FOR VACCINATION: ICD-10-CM

## 2019-05-09 DIAGNOSIS — Z98.890 STATUS POST CATHETER ABLATION OF ATRIAL FLUTTER: ICD-10-CM

## 2019-05-09 DIAGNOSIS — I11.0 MALIGNANT HYPERTENSION WITH SYSTOLIC CHF, NYHA CLASS 2 (HCC): ICD-10-CM

## 2019-05-09 DIAGNOSIS — E66.01 MORBID OBESITY WITH BODY MASS INDEX (BMI) OF 40.0 TO 44.9 IN ADULT (HCC): ICD-10-CM

## 2019-05-09 DIAGNOSIS — E11.42 DIABETIC POLYNEUROPATHY ASSOCIATED WITH TYPE 2 DIABETES MELLITUS (HCC): Primary | ICD-10-CM

## 2019-05-09 DIAGNOSIS — E78.2 MIXED HYPERLIPIDEMIA: ICD-10-CM

## 2019-05-09 PROCEDURE — 90732 PPSV23 VACC 2 YRS+ SUBQ/IM: CPT

## 2019-05-09 PROCEDURE — 3008F BODY MASS INDEX DOCD: CPT | Performed by: FAMILY MEDICINE

## 2019-05-09 PROCEDURE — 99214 OFFICE O/P EST MOD 30 MIN: CPT | Performed by: FAMILY MEDICINE

## 2019-05-09 PROCEDURE — 1036F TOBACCO NON-USER: CPT | Performed by: FAMILY MEDICINE

## 2019-05-09 PROCEDURE — 90471 IMMUNIZATION ADMIN: CPT

## 2019-05-10 DIAGNOSIS — I10 HTN (HYPERTENSION), MALIGNANT: ICD-10-CM

## 2019-05-14 RX ORDER — CARVEDILOL 25 MG/1
TABLET ORAL
Qty: 180 TABLET | Refills: 3 | Status: SHIPPED | OUTPATIENT
Start: 2019-05-14 | End: 2020-05-21

## 2019-07-04 DIAGNOSIS — I50.20 SYSTOLIC HEART FAILURE (HCC): ICD-10-CM

## 2019-07-05 RX ORDER — SPIRONOLACTONE 25 MG/1
TABLET ORAL
Qty: 30 TABLET | Refills: 5 | Status: SHIPPED | OUTPATIENT
Start: 2019-07-05 | End: 2020-02-20

## 2019-08-04 DIAGNOSIS — E78.5 DYSLIPIDEMIA: ICD-10-CM

## 2019-08-04 DIAGNOSIS — E11.42 DIABETIC POLYNEUROPATHY ASSOCIATED WITH TYPE 2 DIABETES MELLITUS (HCC): ICD-10-CM

## 2019-08-04 DIAGNOSIS — I10 CHRONIC HYPERTENSION: ICD-10-CM

## 2019-08-04 PROCEDURE — 4010F ACE/ARB THERAPY RXD/TAKEN: CPT | Performed by: FAMILY MEDICINE

## 2019-08-04 RX ORDER — ENALAPRIL MALEATE 20 MG/1
TABLET ORAL
Qty: 90 TABLET | Refills: 7 | Status: SHIPPED | OUTPATIENT
Start: 2019-08-04 | End: 2020-08-24 | Stop reason: SDUPTHER

## 2019-08-04 RX ORDER — ROSUVASTATIN CALCIUM 20 MG/1
TABLET, COATED ORAL
Qty: 30 TABLET | Refills: 11 | Status: SHIPPED | OUTPATIENT
Start: 2019-08-04 | End: 2020-10-01 | Stop reason: SDUPTHER

## 2019-08-06 DIAGNOSIS — I10 CHRONIC HYPERTENSION: ICD-10-CM

## 2019-08-06 RX ORDER — AMLODIPINE BESYLATE 5 MG/1
TABLET ORAL
Qty: 90 TABLET | Refills: 3 | Status: SHIPPED | OUTPATIENT
Start: 2019-08-06 | End: 2020-08-12 | Stop reason: SDUPTHER

## 2019-08-09 DIAGNOSIS — B35.6 JOCK ITCH: ICD-10-CM

## 2019-08-09 RX ORDER — NYSTATIN 100000 [USP'U]/G
POWDER TOPICAL
Qty: 60 G | Refills: 1 | Status: SHIPPED | OUTPATIENT
Start: 2019-08-09 | End: 2020-04-30

## 2019-09-04 LAB
ALBUMIN SERPL-MCNC: 4.2 G/DL (ref 3.6–5.1)
ALBUMIN/GLOB SERPL: 1.4 (CALC) (ref 1–2.5)
ALP SERPL-CCNC: 91 U/L (ref 40–115)
ALT SERPL-CCNC: 20 U/L (ref 9–46)
AST SERPL-CCNC: 13 U/L (ref 10–35)
BILIRUB SERPL-MCNC: 0.6 MG/DL (ref 0.2–1.2)
BUN SERPL-MCNC: 11 MG/DL (ref 7–25)
BUN/CREAT SERPL: ABNORMAL (CALC) (ref 6–22)
CALCIUM SERPL-MCNC: 9.4 MG/DL (ref 8.6–10.3)
CHLORIDE SERPL-SCNC: 101 MMOL/L (ref 98–110)
CHOLEST SERPL-MCNC: 124 MG/DL
CHOLEST/HDLC SERPL: 3.9 (CALC)
CO2 SERPL-SCNC: 30 MMOL/L (ref 20–32)
CREAT SERPL-MCNC: 0.87 MG/DL (ref 0.7–1.33)
GLOBULIN SER CALC-MCNC: 3.1 G/DL (CALC) (ref 1.9–3.7)
GLUCOSE SERPL-MCNC: 132 MG/DL (ref 65–99)
HBA1C MFR BLD: 7.7 % OF TOTAL HGB
HDLC SERPL-MCNC: 32 MG/DL
LDLC SERPL CALC-MCNC: 66 MG/DL (CALC)
NONHDLC SERPL-MCNC: 92 MG/DL (CALC)
POTASSIUM SERPL-SCNC: 4.7 MMOL/L (ref 3.5–5.3)
PROT SERPL-MCNC: 7.3 G/DL (ref 6.1–8.1)
SL AMB EGFR AFRICAN AMERICAN: 117 ML/MIN/1.73M2
SL AMB EGFR NON AFRICAN AMERICAN: 101 ML/MIN/1.73M2
SODIUM SERPL-SCNC: 139 MMOL/L (ref 135–146)
TRIGL SERPL-MCNC: 184 MG/DL

## 2019-09-06 ENCOUNTER — OFFICE VISIT (OUTPATIENT)
Dept: FAMILY MEDICINE CLINIC | Facility: CLINIC | Age: 49
End: 2019-09-06
Payer: COMMERCIAL

## 2019-09-06 VITALS
WEIGHT: 315 LBS | OXYGEN SATURATION: 95 % | HEART RATE: 84 BPM | DIASTOLIC BLOOD PRESSURE: 78 MMHG | HEIGHT: 71 IN | RESPIRATION RATE: 18 BRPM | BODY MASS INDEX: 44.1 KG/M2 | SYSTOLIC BLOOD PRESSURE: 138 MMHG | TEMPERATURE: 97.8 F

## 2019-09-06 DIAGNOSIS — I11.0 MALIGNANT HYPERTENSION WITH SYSTOLIC CHF, NYHA CLASS 2 (HCC): ICD-10-CM

## 2019-09-06 DIAGNOSIS — R05.9 COUGH: ICD-10-CM

## 2019-09-06 DIAGNOSIS — I50.20 MALIGNANT HYPERTENSION WITH SYSTOLIC CHF, NYHA CLASS 2 (HCC): ICD-10-CM

## 2019-09-06 DIAGNOSIS — N52.01 ERECTILE DYSFUNCTION DUE TO ARTERIAL INSUFFICIENCY: ICD-10-CM

## 2019-09-06 DIAGNOSIS — Z12.11 SCREEN FOR COLON CANCER: ICD-10-CM

## 2019-09-06 DIAGNOSIS — E11.42 DIABETIC POLYNEUROPATHY ASSOCIATED WITH TYPE 2 DIABETES MELLITUS (HCC): Primary | ICD-10-CM

## 2019-09-06 DIAGNOSIS — E78.2 MIXED HYPERLIPIDEMIA: ICD-10-CM

## 2019-09-06 LAB
LEFT EYE DIABETIC RETINOPATHY: NORMAL
LEFT EYE IMAGE QUALITY: NORMAL
LEFT EYE MACULAR EDEMA: NORMAL
LEFT EYE OTHER RETINOPATHY: NORMAL
RIGHT EYE DIABETIC RETINOPATHY: NORMAL
RIGHT EYE IMAGE QUALITY: NORMAL
RIGHT EYE MACULAR EDEMA: NORMAL
RIGHT EYE OTHER RETINOPATHY: NORMAL
SEVERITY (EYE EXAM): NORMAL

## 2019-09-06 PROCEDURE — 92250 FUNDUS PHOTOGRAPHY W/I&R: CPT | Performed by: FAMILY MEDICINE

## 2019-09-06 PROCEDURE — 99214 OFFICE O/P EST MOD 30 MIN: CPT | Performed by: FAMILY MEDICINE

## 2019-09-06 RX ORDER — SILDENAFIL 100 MG/1
100 TABLET, FILM COATED ORAL DAILY PRN
Qty: 10 TABLET | Refills: 5 | Status: SHIPPED | OUTPATIENT
Start: 2019-09-06 | End: 2020-11-23

## 2019-09-06 NOTE — ASSESSMENT & PLAN NOTE
Lab Results   Component Value Date    HGBA1C 7 7 (H) 09/03/2019       No results for input(s): POCGLU in the last 72 hours      Blood Sugar Average: Last 72 hrs:  will increase tresciba and pt will improve diet

## 2019-09-06 NOTE — PROGRESS NOTES
Assessment/Plan:    Problem List Items Addressed This Visit        Endocrine    Diabetic polyneuropathy associated with type 2 diabetes mellitus ([facility] [address] ) - Primary     Lab Results   Component Value Date    HGBA1C 7 7 (H) 09/03/2019       No results for input(s): POCGLU in the last 72 hours  Blood Sugar Average: Last 72 hrs:  will increase tresciba and pt will improve diet         Relevant Medications    insulin degludec (TRESIBA FLEXTOUCH) 200 units/mL CONCENTRATED U-200 injection pen    Other Relevant Orders    IRIS Diabetic eye exam    Comprehensive metabolic panel    Hemoglobin A1C    Microalbumin / creatinine urine ratio       Cardiovascular and Mediastinum    Malignant hypertension with systolic CHF, NYHA class 2 (HCC)    Relevant Medications    sildenafil (VIAGRA) 100 mg tablet    Erectile dysfunction due to arterial insufficiency    Relevant Medications    sildenafil (VIAGRA) 100 mg tablet       Other    Mixed hyperlipidemia      Other Visit Diagnoses     Cough        Relevant Orders    [place] Allergy Panel, Adult    Allergen Profile, Basic Food    Allergy, Milk Components Panel    Screen for colon cancer        Relevant Orders    Ambulatory referral to Gastroenterology          BMI Counseling: Body mass index is 44 27 kg/m²  Discussed the patient's BMI with him  The BMI is above average  BMI counseling and education was provided to the patient  Nutrition recommendations include reducing portion sizes  There are no Patient Instructions on file for this visit  Return in about 3 months (around 12/6/2019) for Recheck  Subjective:      Patient ID: Naina Carbajal is a 47 y o  male  Chief Complaint   Patient presents with    Follow-up     1mo f/u  Luz Morales       Pt is here for a follo wup  Pt had labs  No chest pain no sob  Pt denies polyuria no polydipsia  Pt states he has been phlemmy and has a bit of a cough  Usually in the am when he gets up    Has been a while    Pt states his erections are not as good a sthey used to be      The following portions of the patient's history were reviewed and updated as appropriate: allergies, current medications, past family history, past medical history, past social history, past surgical history and problem list     Review of Systems   Constitutional: Negative for activity change, appetite change, chills, diaphoresis, fatigue, fever and unexpected weight change  HENT: Positive for congestion  Negative for dental problem, ear pain, mouth sores, sinus pressure, sinus pain, sore throat and trouble swallowing  Eyes: Negative for photophobia, discharge and itching  Respiratory: Negative for apnea, chest tightness and shortness of breath  Cardiovascular: Negative for chest pain, palpitations and leg swelling  Gastrointestinal: Negative for abdominal distention, abdominal pain, blood in stool, nausea and vomiting  Endocrine: Negative for cold intolerance, heat intolerance, polydipsia, polyphagia and polyuria  Genitourinary: Negative for difficulty urinating  Musculoskeletal: Negative for arthralgias  Skin: Negative for color change and wound  Neurological: Negative for dizziness, syncope, speech difficulty and headaches  Hematological: Negative for adenopathy  Psychiatric/Behavioral: Negative for agitation and behavioral problems           Current Outpatient Medications   Medication Sig Dispense Refill    amLODIPine (NORVASC) 5 mg tablet Take 1 tablet (5 mg total) by mouth daily 90 tablet 0    B-D ULTRAFINE III SHORT PEN 31G X 8 MM MISC use as directed BY  each 2    carvedilol (COREG) 25 mg tablet Take 1 tablet (25 mg total) by mouth 2 (two) times a day with meals 180 tablet 0    Cholecalciferol (VITAMIN D) 2000 units tablet Take 1 tablet by mouth daily      Coenzyme Q10 (COQ-10) 200 MG CAPS Take by mouth      enalapril (VASOTEC) 20 mg tablet Take 1 tablet (20 mg total) by mouth 2 (two) times a day 90 tablet 0    FARXIGA 10 MG TABS take 1 tablet by mouth once daily 90 tablet 1    glucose blood test strip by In Vitro route      insulin degludec (TRESIBA FLEXTOUCH) 200 units/mL CONCENTRATED U-200 injection pen Inject 50 Units under the skin daily at bedtime for 180 days 8 pen 1    Insulin Pen Needle (B-D ULTRAFINE III SHORT PEN) 31G X 8 MM MISC by Does not apply route      metFORMIN (GLUCOPHAGE) 1000 MG tablet Take 1 tablet (1,000 mg total) by mouth 2 (two) times a day with meals 180 tablet 1    nystatin (MYCOSTATIN) powder apply to affected area twice a day 60 g 1    ONETOUCH DELICA LANCETS FINE MISC by Does not apply route      rosuvastatin (CRESTOR) 20 MG tablet take 1 tablet by mouth once daily 30 tablet 11    spironolactone (ALDACTONE) 25 mg tablet take 1 tablet by mouth once daily 30 tablet 5    VASCEPA 1 g CAPS Take 2 capsules (2 g total) by mouth 2 (two) times a day 360 capsule 3    amLODIPine (NORVASC) 5 mg tablet take 1 tablet by mouth once daily (Patient not taking: Reported on 9/6/2019) 90 tablet 3    carvedilol (COREG) 25 mg tablet take 1 tablet by mouth twice a day with food (Patient not taking: Reported on 9/6/2019) 180 tablet 3    enalapril (VASOTEC) 20 mg tablet take 1 tablet by mouth twice a day (Patient not taking: Reported on 9/6/2019) 90 tablet 7    sildenafil (VIAGRA) 100 mg tablet Take 1 tablet (100 mg total) by mouth daily as needed for erectile dysfunction 10 tablet 5     No current facility-administered medications for this visit  Objective:    /78   Pulse 84   Temp 97 8 °F (36 6 °C)   Resp 18   Ht 5' 11" (1 803 m)   Wt (!) 144 kg (317 lb 6 4 oz)   SpO2 95%   BMI 44 27 kg/m²        Physical Exam   Constitutional: He appears well-developed and well-nourished  No distress  HENT:   Head: Normocephalic and atraumatic  Right Ear: External ear normal    Left Ear: External ear normal    Nose: Nose normal    Mouth/Throat: Oropharynx is clear and moist  No oropharyngeal exudate     Eyes: Pupils are equal, round, and reactive to light  EOM are normal  Right eye exhibits no discharge  Left eye exhibits no discharge  No scleral icterus  Neck: No thyromegaly present  Cardiovascular: Normal rate and normal heart sounds  No murmur heard  Pulmonary/Chest: Effort normal and breath sounds normal  No respiratory distress  He has no wheezes  Abdominal: Soft  Bowel sounds are normal  He exhibits no distension and no mass  There is no tenderness  There is no rebound and no guarding  Musculoskeletal: Normal range of motion  Neurological: He is alert  He displays normal reflexes  Coordination normal    Skin: Skin is warm and dry  No rash noted  He is not diaphoretic  No erythema  Psychiatric: He has a normal mood and affect  His behavior is normal    Nursing note and vitals reviewed             Recent Results (from the past 672 hour(s))   Lipid Panel with Direct LDL reflex    Collection Time: 09/03/19 10:23 AM   Result Value Ref Range    Total Cholesterol 124 <200 mg/dL    HDL 32 (L) >40 mg/dL    Triglycerides 184 (H) <150 mg/dL    LDL Direct 66 mg/dL (calc)    Chol HDLC Ratio 3 9 <5 0 (calc)    Non-HDL Cholesterol 92 <130 mg/dL (calc)   Comprehensive metabolic panel    Collection Time: 09/03/19 10:23 AM   Result Value Ref Range    Glucose, Random 132 (H) 65 - 99 mg/dL    BUN 11 7 - 25 mg/dL    Creatinine 0 87 0 70 - 1 33 mg/dL    eGFR Non  101 > OR = 60 mL/min/1 73m2    eGFR  117 > OR = 60 mL/min/1 73m2    SL AMB BUN/CREATININE RATIO NOT APPLICABLE 6 - 22 (calc)    Sodium 139 135 - 146 mmol/L    Potassium 4 7 3 5 - 5 3 mmol/L    Chloride 101 98 - 110 mmol/L    CO2 30 20 - 32 mmol/L    SL AMB CALCIUM 9 4 8 6 - 10 3 mg/dL    Protein, Total 7 3 6 1 - 8 1 g/dL    Albumin 4 2 3 6 - 5 1 g/dL    Globulin 3 1 1 9 - 3 7 g/dL (calc)    Albumin/Globulin Ratio 1 4 1 0 - 2 5 (calc)    TOTAL BILIRUBIN 0 6 0 2 - 1 2 mg/dL    Alkaline Phosphatase 91 40 - 115 U/L    AST 13 10 - 35 U/L    ALT 20 9 - 46 U/L   Hemoglobin A1c (w/out EAG)    Collection Time: 09/03/19 10:23 AM   Result Value Ref Range    Hemoglobin A1C 7 7 (H) <5 7 % of total Hgb     p valerio Ferreira DO

## 2019-09-14 DIAGNOSIS — E78.5 DYSLIPIDEMIA: ICD-10-CM

## 2019-09-16 RX ORDER — ICOSAPENT ETHYL 1000 MG/1
CAPSULE ORAL
Qty: 360 CAPSULE | Refills: 3 | Status: SHIPPED | OUTPATIENT
Start: 2019-09-16 | End: 2020-10-01

## 2019-10-01 ENCOUNTER — OFFICE VISIT (OUTPATIENT)
Dept: CARDIOLOGY CLINIC | Facility: CLINIC | Age: 49
End: 2019-10-01
Payer: COMMERCIAL

## 2019-10-01 VITALS
WEIGHT: 315 LBS | HEIGHT: 71 IN | OXYGEN SATURATION: 95 % | DIASTOLIC BLOOD PRESSURE: 78 MMHG | BODY MASS INDEX: 44.1 KG/M2 | SYSTOLIC BLOOD PRESSURE: 130 MMHG

## 2019-10-01 DIAGNOSIS — I87.2 VENOUS INSUFFICIENCY: Primary | ICD-10-CM

## 2019-10-01 DIAGNOSIS — G47.33 OBSTRUCTIVE SLEEP APNEA: ICD-10-CM

## 2019-10-01 DIAGNOSIS — I10 CHRONIC HYPERTENSION: ICD-10-CM

## 2019-10-01 DIAGNOSIS — E11.42 DIABETIC POLYNEUROPATHY ASSOCIATED WITH TYPE 2 DIABETES MELLITUS (HCC): ICD-10-CM

## 2019-10-01 DIAGNOSIS — I11.0 MALIGNANT HYPERTENSION WITH SYSTOLIC CHF, NYHA CLASS 2 (HCC): ICD-10-CM

## 2019-10-01 DIAGNOSIS — I50.20 MALIGNANT HYPERTENSION WITH SYSTOLIC CHF, NYHA CLASS 2 (HCC): ICD-10-CM

## 2019-10-01 DIAGNOSIS — G47.33 OSA (OBSTRUCTIVE SLEEP APNEA): ICD-10-CM

## 2019-10-01 DIAGNOSIS — E66.01 CLASS 3 SEVERE OBESITY DUE TO EXCESS CALORIES WITH SERIOUS COMORBIDITY AND BODY MASS INDEX (BMI) OF 40.0 TO 44.9 IN ADULT (HCC): ICD-10-CM

## 2019-10-01 PROCEDURE — 3078F DIAST BP <80 MM HG: CPT | Performed by: INTERNAL MEDICINE

## 2019-10-01 PROCEDURE — 93000 ELECTROCARDIOGRAM COMPLETE: CPT | Performed by: INTERNAL MEDICINE

## 2019-10-01 PROCEDURE — 99214 OFFICE O/P EST MOD 30 MIN: CPT | Performed by: INTERNAL MEDICINE

## 2019-10-01 PROCEDURE — 3075F SYST BP GE 130 - 139MM HG: CPT | Performed by: INTERNAL MEDICINE

## 2019-10-01 NOTE — PROGRESS NOTES
Cardiology Follow Up  Armida Shetty  1970  [id number]  [address] [facility] [address], [place] [id number] [facility] [address] [address] [phone]    1  Venous insufficiency  POCT ECG   2  Malignant hypertension with systolic CHF, NYHA class 2 (HCC)  POCT ECG   3  KYA (obstructive sleep apnea)  POCT ECG   4  Diabetic polyneuropathy associated with type 2 diabetes mellitus (HCC)  POCT ECG   5  Chronic hypertension  POCT ECG      Discussion/Plan:  Hld- triglycerides are elevated to 180  -- Crestor 20 mg + coq10  -- vascepa 4000mg compliance     Nonischemic HF- NYHA class 2 on exam  Euvolemia  EF improved to 50-55%  bp optimized  -- salt restriction  -- carvedilol 25mg bid  -- enalapril 20mg bid  -- aldosterone 25mg daily    Inferior q wave position  -- monitor    Morbid obesity- BMI 41  Weight is trending up  -- discussion about medical weight loss  Discussed its interaction with bp/heart failure/flutter  Malignant Htn- controlled-- carvedillol 25mg-- enalapril + aldosterone-+ amlodipine 5mg - low salt dietA    trial flutter- typical  s/p ablation  sotalol d/c  Resolved    DM2- controlled    KYA- compliant with CPAP  Discussed wt loss    Rtc- one year      Interval History:  39 yo hx nonischemic heart failure with recovery of EF from 35-55%, obesity, dm2 on metformin, typical atrial flutter s/p successful ablation presents for follow-up  No symptoms of heart failure  No exercising much  Has had some weight gain  Diet is not optimized  Taking his medications including cholesterol  4/5:He has had no palpitations, shortness of breath, lower from edema  He is trying an exercise program  He is trying to improve his diet  We discussed in detail his advanced lipid panel  All his questions were answered  7/10: Denies having any return of palpitations  No dizziness or light-headness  HAs had trouble with his weights  Likes carbs  Triglycerides are not at goal  Compliant with meds  no chest pain  08/20/2018:  He denies having any recurrence of arrhythmias  He is compliant with his medications  He has not yet started structured exercise program   However he is starting with his wife  He denies having dizziness or lightheadedness  He denies having any lower extremity edema  10/01/2019:  He denies having chest discomfort  Denies having lower extremity edema  He denies feeling dizziness or lightheadedness  He is compliant with medications  He has had some forgetfulness with taking his Omega threes  We reviewed his last lipid panel          Patient Active Problem List   Diagnosis    Diabetic polyneuropathy associated with type 2 diabetes mellitus ([facility] )    Status post catheter ablation of atrial flutter    Mixed hyperlipidemia    Malignant hypertension with systolic CHF, NYHA class 2 ([place] [address] )    Venous insufficiency (chronic) (peripheral)    Obstructive sleep apnea    Morbid obesity with body mass index (BMI) of 40 0 to 44 9 in adult [facility])    Erectile dysfunction due to arterial insufficiency     Past Medical History:   Diagnosis Date    Acute bacterial prostatitis 09/28/2005    Last Assessed: 9/28/2005     Atrial flutter ([facility] )     Last Assessed: 7/7/2017     Cellulitis of toe of right foot     Last Assessed: 3/10/2017     Dermatophytosis of groin     Last Assessed: 3/21/2016     Hypertension     Hypokalemia     Last Assessed: 8/31/2015     Obesity     Last Assessed: 11/30/2015     Onychomycosis     Last Assessed: 2/15/2017     Sleep apnea     Tinea pedis of both feet     Last Assessed: 9/6/2016     Type 2 diabetes mellitus ([place] [address] )      Social History     Socioeconomic History    Marital status: /Civil Union     Spouse name: Not on file    Number of children: Not on file    Years of education: Not on file    Highest education level: Not on file   Occupational History    Not on file   Social Needs    Financial resource strain: Not on file    Food insecurity:     Worry: Not on file     Inability: Not on file    Transportation needs:     Medical: Not on file     Non-medical: Not on file   Tobacco Use    Smoking status: Former Smoker     Packs/day:      Years:      Pack years:      Last attempt to quit: 2015     Years since quittin 7    Smokeless tobacco: Never Used   Substance and Sexual Activity    Alcohol use: Yes     Frequency: Monthly or less     Drinks per session: 1 or 2     Binge frequency: Never     Comment: ! drink/month- occasional alcohol use     Drug use: No    Sexual activity: Not on file   Lifestyle    Physical activity:     Days per week: Not on file     Minutes per session: Not on file    Stress: Not on file   Relationships    Social connections:     Talks on phone: Not on file     Gets together: Not on file     Attends Mosque service: Not on file     Active member of club or organization: Not on file     Attends meetings of clubs or organizations: Not on file     Relationship status: Not on file    Intimate partner violence:     Fear of current or ex partner: Not on file     Emotionally abused: Not on file     Physically abused: Not on file     Forced sexual activity: Not on file   Other Topics Concern    Not on file   Social History Narrative    Sleeps 6-7 hours a day     Exercise: Walking       Family History   Problem Relation Age of Onset    Arthritis Mother     Arrhythmia Mother     Hypertension Mother     Anxiety disorder Father     Arthritis Father     Atrial fibrillation Father     Hypertension Father     Other Father         Impaired cognition     Mental illness Neg Hx      Past Surgical History:   Procedure Laterality Date    CARDIAC SURGERY      flutter repair    VASECTOMY      Last Assessed: 2015        Current Outpatient Medications:     amLODIPine (NORVASC) 5 mg tablet, Take 1 tablet (5 mg total) by mouth daily, Disp: 90 tablet, Rfl: 0    amLODIPine (NORVASC) 5 mg tablet, take 1 tablet by mouth once daily, Disp: 90 tablet, Rfl: 3    B-D ULTRAFINE III SHORT PEN 31G X 8 MM MISC, use as directed BY MD, Disp: 100 each, Rfl: 2    carvedilol (COREG) 25 mg tablet, Take 1 tablet (25 mg total) by mouth 2 (two) times a day with meals, Disp: 180 tablet, Rfl: 0    carvedilol (COREG) 25 mg tablet, take 1 tablet by mouth twice a day with food, Disp: 180 tablet, Rfl: 3    Cholecalciferol (VITAMIN D) 2000 units tablet, Take 1 tablet by mouth daily, Disp: , Rfl:     Coenzyme Q10 (COQ-10) 200 MG CAPS, Take by mouth, Disp: , Rfl:     enalapril (VASOTEC) 20 mg tablet, Take 1 tablet (20 mg total) by mouth 2 (two) times a day, Disp: 90 tablet, Rfl: 0    enalapril (VASOTEC) 20 mg tablet, take 1 tablet by mouth twice a day, Disp: 90 tablet, Rfl: 7    FARXIGA 10 MG TABS, take 1 tablet by mouth once daily, Disp: 90 tablet, Rfl: 1    glucose blood test strip, by In Vitro route, Disp: , Rfl:     insulin degludec (TRESIBA FLEXTOUCH) 200 units/mL CONCENTRATED U-200 injection pen, Inject 50 Units under the skin daily at bedtime for 180 days, Disp: 8 pen, Rfl: 1    Insulin Pen Needle (B-D ULTRAFINE III SHORT PEN) 31G X 8 MM MISC, by Does not apply route, Disp: , Rfl:     metFORMIN (GLUCOPHAGE) 1000 MG tablet, Take 1 tablet (1,000 mg total) by mouth 2 (two) times a day with meals, Disp: 180 tablet, Rfl: 1    nystatin (MYCOSTATIN) powder, apply to affected area twice a day, Disp: 60 g, Rfl: 1    ONETOUCH DELICA LANCETS FINE MISC, by Does not apply route, Disp: , Rfl:     rosuvastatin (CRESTOR) 20 MG tablet, take 1 tablet by mouth once daily, Disp: 30 tablet, Rfl: 11    sildenafil (VIAGRA) 100 mg tablet, Take 1 tablet (100 mg total) by mouth daily as needed for erectile dysfunction, Disp: 10 tablet, Rfl: 5    spironolactone (ALDACTONE) 25 mg tablet, take 1 tablet by mouth once daily, Disp: 30 tablet, Rfl: 5    VASCEPA 1 g CAPS, take 2 capsules by mouth twice a day, Disp: 360 capsule, Rfl: 3  No Known Allergies    Review of Systems:  Review of Systems   Constitutional: Negative  HENT: Negative  Eyes: Negative  Respiratory: Negative  Cardiovascular: Negative  Gastrointestinal: Negative  Endocrine: Negative  Genitourinary: Negative  Musculoskeletal: Negative  Skin: Negative  Allergic/Immunologic: Negative  Neurological: Negative  Hematological: Negative  Psychiatric/Behavioral: Negative  Vitals:    10/01/19 0812   BP: 130/78   BP Location: Right arm   Patient Position: Sitting   Cuff Size: Large   SpO2: 95%   Weight: (!) 146 kg (321 lb)   Height: 5' 11" (1 803 m)     Physical Exam:  Physical Exam   Constitutional: He is oriented to person, place, and time  He appears well-developed and well-nourished  No distress  HENT:   Head: Normocephalic and atraumatic  Right Ear: External ear normal    Left Ear: External ear normal    Eyes: Pupils are equal, round, and reactive to light  Conjunctivae are normal  Right eye exhibits no discharge  Left eye exhibits no discharge  No scleral icterus  Neck: Normal range of motion  Neck supple  No JVD present  No tracheal deviation present  No thyromegaly present  Cardiovascular: Normal rate and regular rhythm  Exam reveals no gallop and no friction rub  No murmur heard  Pulmonary/Chest: Effort normal and breath sounds normal  No stridor  No respiratory distress  He has no wheezes  He has no rales  He exhibits no tenderness  Abdominal: Soft  Bowel sounds are normal  He exhibits distension  He exhibits no mass  There is no tenderness  There is no rebound and no guarding  Musculoskeletal: Normal range of motion  He exhibits no edema, tenderness or deformity  Neurological: He is alert and oriented to person, place, and time  He has normal reflexes  No cranial nerve deficit  He exhibits normal muscle tone  Coordination normal    Skin: Skin is warm and dry  No rash noted   He is not diaphoretic  No erythema  No pallor  Psychiatric: He has a normal mood and affect  His behavior is normal  Judgment and thought content normal        Labs:     Lab Results   Component Value Date    WBC 8 5 12/07/2018    HGB 16 7 12/07/2018    HCT 50 1 (H) 12/07/2018    MCV 88 5 12/07/2018     12/07/2018     Lab Results   Component Value Date     11/04/2017    K 4 7 09/03/2019     09/03/2019    CO2 30 09/03/2019    BUN 11 09/03/2019    CREATININE 0 87 09/03/2019    GLUCOSE 133 (H) 11/04/2017    GLUF 131 (H) 08/06/2018    CALCIUM 9 4 09/03/2019    AST 13 09/03/2019    ALT 20 09/03/2019    ALKPHOS 91 09/03/2019    PROT 7 1 11/04/2017    BILITOT 0 5 11/04/2017    EGFR 104 08/06/2018     Lab Results   Component Value Date    CHOL 115 07/06/2017    CHOL 156 10/22/2016    CHOL 121 03/16/2016     Lab Results   Component Value Date    HDL 32 (L) 09/03/2019    HDL 29 (L) 12/07/2018    HDL 29 (L) 04/30/2018     Lab Results   Component Value Date    LDLCALC 52 07/06/2017    LDLCALC 91 10/22/2016    LDLCALC 62 03/16/2016     Lab Results   Component Value Date    TRIG 184 (H) 09/03/2019    TRIG 140 12/07/2018    TRIG 135 04/30/2018     Lab Results   Component Value Date    HGBA1C 7 7 (H) 09/03/2019       Imaging & Testing   I have personally reviewed pertinent reports  EKG: Personally reviewed  Normal sinus rhythm  QRS 90  W[place] interval 150  QTC of 400  Normal sinus rhythm inferior Q-waves no acute ST T wave changes    Cardiac testing:   No results found for this or any previous visit  Breonna Lyn  Please call with any questions or suggestions    A description of the counseling:   Goals and Barriers:  Patient's ability to self care:  Medication side effect reviewed with patient in detail and all their questions answered  "This note has been constructed using a voice recognition system  Therefore there may be syntax, spelling, and/or grammatical errors   Please call if you have any questions  "

## 2019-10-02 ENCOUNTER — TELEPHONE (OUTPATIENT)
Dept: BARIATRICS | Facility: CLINIC | Age: 49
End: 2019-10-02

## 2019-11-26 LAB
LEFT EYE DIABETIC RETINOPATHY: NORMAL
RIGHT EYE DIABETIC RETINOPATHY: NORMAL

## 2020-01-02 DIAGNOSIS — E11.42 DIABETIC POLYNEUROPATHY ASSOCIATED WITH TYPE 2 DIABETES MELLITUS (HCC): ICD-10-CM

## 2020-01-03 RX ORDER — DAPAGLIFLOZIN 10 MG/1
TABLET, FILM COATED ORAL
Qty: 90 TABLET | Refills: 0 | Status: SHIPPED | OUTPATIENT
Start: 2020-01-03 | End: 2020-04-28

## 2020-01-24 DIAGNOSIS — E11.42 DIABETIC POLYNEUROPATHY ASSOCIATED WITH TYPE 2 DIABETES MELLITUS (HCC): ICD-10-CM

## 2020-01-27 NOTE — ASSESSMENT & PLAN NOTE
Lab Results   Component Value Date    HGBA1C 7 7 (H) 09/03/2019   -uncontrolled  -on Tresiba, Metformin and Farxiga  -Goal would be to cut back on insulin  -Informed patient to  Check BS BID when starting [facility]   Parameters given and reviewed how to treat hypoylcemia

## 2020-01-27 NOTE — ASSESSMENT & PLAN NOTE
-Discussed options of [facility]-Intensive Lifestyle Intervention Program, Very Low Calorie Diet-VLCD, Conservative Program, Ancelmo-En-Y Gastric Bypass and Vertical Sleeve Gastrectomy and the role of weight loss medications  Not a candidate for sympathomimetic due to hx of A  Flutter  -Initial weight loss goal of 5-10% weight loss for improved health  -Screening labs: reviewed labs; recommend TSH  -Patient is interested in pursuing [facility]  Also has some interest in bariatric surgery   Given info to attend seminar

## 2020-01-27 NOTE — PROGRESS NOTES
Assessment/Plan:    Severe obesity (BMI 35 0-39  9) with comorbidity ([place] [address] )  -Discussed options of HealthyCORE-Intensive Lifestyle Intervention Program, Very Low Calorie Diet-VLCD, Conservative Program, Ancelmo-En-Y Gastric Bypass and Vertical Sleeve Gastrectomy and the role of weight loss medications  Not a candidate for sympathomimetic due to hx of A  Flutter  -Initial weight loss goal of 5-10% weight loss for improved health  -Screening labs: reviewed labs; recommend TSH  -Patient is interested in pursuing Health[facility]  Also has some interest in bariatric surgery  Given info to attend seminar    Diabetic polyneuropathy associated with type 2 diabetes mellitus ([place] [address] )    Lab Results   Component Value Date    HGBA1C 7 7 (H) 09/03/2019   -uncontrolled  -on Tresiba, Metformin and Farxiga  -Goal would be to cut back on insulin  -Informed patient to  Check BS BID when starting Health[facility]  Parameters given and reviewed how to treat hypoylcemia    Malignant hypertension with systolic CHF, NYHA class 2 ([place] [address] )  -EF improved to 55%  -on Enalepril, Coreg, Amlodipine and Aldactone  -recheck of BP Normotensive in office  -hx A  Flutter s/p Ablation  -May improve with weight loss      Obstructive sleep apnea  -reports compliant with CPAP  -may improve with weight loss    Mixed hyperlipidemia  -on Vascepa and Crestor    Goals:    Food log (ie ) www myfitnesspal com,sparkpeople  com,loseit com,calorieking  com,etc    No sugary beverages  At least 64oz of water daily  Increase physical activity by 10 minutes daily  Gradually increase physical activity to a goal of 5 days per week for 30 minutes of MODERATE intensity PLUS 2 days per week of FULL BODY resistance training  Monitor BS twice a day, fasting and before evening insulin   If BS consistently less than 80, please notify office or family doctor  Monitor blood pressure, if consistently greater than 140/90, notify cardiologist    Follow up in approximately 1 week with Non-Surgical Dietician and mid point with PA-C    Diagnoses and all orders for this visit:    Severe obesity (BMI 35 0-39  9) with comorbidity ([facility] )  -     TSH, 3rd generation with Free T4 reflex; Future    Diabetic polyneuropathy associated with type 2 diabetes mellitus ([facility] )    Malignant hypertension with systolic CHF, NYHA class 2 (HCC)    Obstructive sleep apnea    Abnormal weight gain  -     TSH, 3rd generation with Free T4 reflex; Future    Mixed hyperlipidemia          Subjective:   Chief Complaint   Patient presents with    Consult     Patient is here for a MWM consult  Patient has a hx of sleep apnea and currently using CPAP       Patient ID: Jason Rubi  is a 47 y o  male with excess weight/obesity here to pursue weight managment      Past Medical History:   Diagnosis Date    Acute bacterial prostatitis 09/28/2005    Last Assessed: 9/28/2005     Atrial flutter ([facility] )     Last Assessed: 7/7/2017     Cellulitis of toe of right foot     Last Assessed: 3/10/2017     Dermatophytosis of groin     Last Assessed: 3/21/2016     Hypertension     Hypokalemia     Last Assessed: 8/31/2015     Obesity     Last Assessed: 11/30/2015     Onychomycosis     Last Assessed: 2/15/2017     Sleep apnea     Tinea pedis of both feet     Last Assessed: 9/6/2016     Type 2 diabetes mellitus (HCC)          HPI:  Obesity/Excess Weight:  Severity: Severe  Onset:  Mid 20's    Modifiers: going to the gym periodically  Contributing factors: unmotivated, poor eating habits, reports he is a picky eater  Associated symptoms: comorbid conditions, decreased mobility and inability to do certain activities    Goals: 250 lbs    B:coffee + oatmeal or pancakes/eggs if goes out  S: skips  L:  Cold cut sandwich + banana + chips  S: if food is around he will pick at it  D: pizza OR pasta OR chicken + veggie  + rice  S: chips or cookies    Hydration: water 1-2  16oz bottles, 2 cups of coffee + creamer, diet iced tea or soda  Exercise: none, has a gym at work   - sedentary    Colonoscopy:  Reports referral in system from PCP, stated he is planning to schedule this soon    The following portions of the patient's history were reviewed and updated as appropriate: allergies, current medications, past family history, past medical history, past social history, past surgical history and problem list     Review of Systems   Constitutional: Negative for chills and fever  HENT: Positive for congestion  Negative for sore throat  Respiratory: Positive for cough (has URI, advise follow up with PCP)  Negative for shortness of breath  Cardiovascular: Negative for chest pain and palpitations  Gastrointestinal: Positive for constipation  Negative for abdominal pain, diarrhea, nausea and vomiting  Musculoskeletal: Negative for arthralgias  Skin: Negative for rash  Neurological: Negative for dizziness and headaches  Psychiatric/Behavioral: The patient is not nervous/anxious  Reports mood stable       Objective:    /86 (BP Location: Left arm, Patient Position: Sitting, Cuff Size: Large)   Pulse 77   Temp 98 °F (36 7 °C) (Tympanic)   Resp 16   Ht 6' 3" (1 905 m)   Wt (!) 144 kg (317 lb)   BMI 39 62 kg/m²  128/84    Physical Exam   Nursing note and vitals reviewed  Constitutional   General appearance: Abnormal   well developed and obese  Eyes No conjunctival pallor  Ears, Nose, Mouth, and Throat Oral mucosa moist    Pulmonary   Respiratory effort: No increased work of breathing or signs of respiratory distress  Auscultation of lungs: Clear to auscultation, equal breath sounds bilaterally, no wheezes, no rales, no rhonci  Cardiovascular   Auscultation of heart: Normal rate and rhythm, normal S1 and S2, without murmurs  Examination of extremities for edema and/or varicosities: Normal   no edema  Abdomen   Abdomen: Abnormal   The abdomen was obese  Bowel sounds were normal  The abdomen was soft and nontender  Musculoskeletal   Gait and station: Normal     Psychiatric   Orientation to person, place and time: Normal     Affect: appropriate

## 2020-01-27 NOTE — ASSESSMENT & PLAN NOTE
-EF improved to 55%  -on Enalepril, Coreg, Amlodipine and Aldactone  -recheck of BP Normotensive in office  -hx A   Flutter s/p Ablation  -May improve with weight loss

## 2020-01-28 ENCOUNTER — OFFICE VISIT (OUTPATIENT)
Dept: BARIATRICS | Facility: CLINIC | Age: 50
End: 2020-01-28
Payer: COMMERCIAL

## 2020-01-28 VITALS
BODY MASS INDEX: 39.17 KG/M2 | RESPIRATION RATE: 16 BRPM | DIASTOLIC BLOOD PRESSURE: 86 MMHG | WEIGHT: 315 LBS | SYSTOLIC BLOOD PRESSURE: 150 MMHG | TEMPERATURE: 98 F | HEIGHT: 75 IN | HEART RATE: 77 BPM

## 2020-01-28 DIAGNOSIS — I50.20 MALIGNANT HYPERTENSION WITH SYSTOLIC CHF, NYHA CLASS 2 (HCC): ICD-10-CM

## 2020-01-28 DIAGNOSIS — I11.0 MALIGNANT HYPERTENSION WITH SYSTOLIC CHF, NYHA CLASS 2 (HCC): ICD-10-CM

## 2020-01-28 DIAGNOSIS — R63.5 ABNORMAL WEIGHT GAIN: ICD-10-CM

## 2020-01-28 DIAGNOSIS — E78.2 MIXED HYPERLIPIDEMIA: ICD-10-CM

## 2020-01-28 DIAGNOSIS — E66.01 SEVERE OBESITY (BMI 35.0-39.9) WITH COMORBIDITY (HCC): Primary | ICD-10-CM

## 2020-01-28 DIAGNOSIS — E11.42 DIABETIC POLYNEUROPATHY ASSOCIATED WITH TYPE 2 DIABETES MELLITUS (HCC): ICD-10-CM

## 2020-01-28 DIAGNOSIS — G47.33 OBSTRUCTIVE SLEEP APNEA: ICD-10-CM

## 2020-01-28 PROCEDURE — 99205 OFFICE O/P NEW HI 60 MIN: CPT | Performed by: PHYSICIAN ASSISTANT

## 2020-01-28 NOTE — PATIENT INSTRUCTIONS
Goals: Food log (ie ) www myfitnesspal com,sparkpeople  com,loseit com,calorieking  com,etc    No sugary beverages  At least 64oz of water daily  Increase physical activity by 10 minutes daily  Gradually increase physical activity to a goal of 5 days per week for 30 minutes of MODERATE intensity PLUS 2 days per week of FULL BODY resistance training  Monitor BS twice a day, fasting and before evening insulin   If BS consistently less than 80, please notify office or family doctor  Monitor blood pressure, if consistently greater than 140/90, notify cardiologist

## 2020-01-31 ENCOUNTER — OFFICE VISIT (OUTPATIENT)
Dept: FAMILY MEDICINE CLINIC | Facility: CLINIC | Age: 50
End: 2020-01-31
Payer: COMMERCIAL

## 2020-01-31 VITALS
SYSTOLIC BLOOD PRESSURE: 166 MMHG | TEMPERATURE: 98.7 F | RESPIRATION RATE: 16 BRPM | HEART RATE: 100 BPM | HEIGHT: 70 IN | WEIGHT: 315 LBS | DIASTOLIC BLOOD PRESSURE: 100 MMHG | BODY MASS INDEX: 45.1 KG/M2 | OXYGEN SATURATION: 94 %

## 2020-01-31 DIAGNOSIS — K12.2 ORAL INFECTION: Primary | ICD-10-CM

## 2020-01-31 DIAGNOSIS — E11.42 DIABETIC POLYNEUROPATHY ASSOCIATED WITH TYPE 2 DIABETES MELLITUS (HCC): ICD-10-CM

## 2020-01-31 PROCEDURE — 99213 OFFICE O/P EST LOW 20 MIN: CPT | Performed by: FAMILY MEDICINE

## 2020-01-31 PROCEDURE — 1036F TOBACCO NON-USER: CPT | Performed by: FAMILY MEDICINE

## 2020-01-31 PROCEDURE — 3008F BODY MASS INDEX DOCD: CPT | Performed by: FAMILY MEDICINE

## 2020-01-31 RX ORDER — TRAMADOL HYDROCHLORIDE 50 MG/1
50 TABLET ORAL EVERY 6 HOURS PRN
Qty: 30 TABLET | Refills: 0 | Status: SHIPPED | OUTPATIENT
Start: 2020-01-31 | End: 2022-04-06

## 2020-01-31 RX ORDER — CLINDAMYCIN HYDROCHLORIDE 300 MG/1
300 CAPSULE ORAL 4 TIMES DAILY
Qty: 40 CAPSULE | Refills: 0 | Status: SHIPPED | OUTPATIENT
Start: 2020-01-31 | End: 2020-02-10

## 2020-01-31 NOTE — PROGRESS NOTES
Assessment/Plan:    1  Oral infection  -     clindamycin (CLEOCIN) 300 MG capsule; Take 1 capsule (300 mg total) by mouth 4 (four) times a day for 10 days  -     traMADol (ULTRAM) 50 mg tablet; Take 1 tablet (50 mg total) by mouth every 6 (six) hours as needed for moderate pain    2  Diabetic polyneuropathy associated with type 2 diabetes mellitus ([facility] )  Assessment & Plan:  Given the diabetes I will hold off on steroids for now  Lab Results   Component Value Date    HGBA1C 7 7 (H) 09/03/2019           does seem like he has an oral infection, I will start him on cvlinda and he will se e dentist tomorrow  If he does not improve I will get imaging    There are no Patient Instructions on file for this visit  No follow-ups on file  Subjective:      Patient ID: Armida Shetty is a 47 y o  male  Chief Complaint   Patient presents with    Facial Swelling     pain began near tooth about a week ago, and today woke up with a swollen face  says hes been taking amoxicillin and motrin for pain jlopezcma        Pt is here for a same day appt  Pt states he had a cold since last weekend  Throat was sore  Pt states the rt upper jaw was sore, he soreness in his tooth  He woke up this am and his face was swollen  Tender if he pokes on it  He had some left over amoxicillin at home and started taking it      The following portions of the patient's history were reviewed and updated as appropriate: allergies, current medications, past family history, past medical history, past social history, past surgical history and problem list     Review of Systems   Constitutional: Negative for activity change, appetite change, chills, diaphoresis, fatigue, fever and unexpected weight change  HENT: Negative for congestion, dental problem, ear pain, mouth sores, sinus pressure, sinus pain, sore throat and trouble swallowing  Facial pain, swelling and tenderness   Eyes: Negative for photophobia, discharge and itching  Respiratory: Negative for apnea, chest tightness and shortness of breath  Cardiovascular: Negative for chest pain, palpitations and leg swelling  Gastrointestinal: Negative for abdominal distention, abdominal pain, blood in stool, nausea and vomiting  Endocrine: Negative for cold intolerance, heat intolerance, polydipsia, polyphagia and polyuria  Genitourinary: Negative for difficulty urinating  Musculoskeletal: Negative for arthralgias  Skin: Negative for color change and wound  Neurological: Negative for dizziness, syncope, speech difficulty and headaches  Hematological: Negative for adenopathy  Psychiatric/Behavioral: Negative for agitation and behavioral problems           Current Outpatient Medications   Medication Sig Dispense Refill    amLODIPine (NORVASC) 5 mg tablet take 1 tablet by mouth once daily 90 tablet 3    B-D ULTRAFINE III SHORT PEN 31G X 8 MM MISC use as directed BY  each 2    carvedilol (COREG) 25 mg tablet take 1 tablet by mouth twice a day with food 180 tablet 3    Cholecalciferol (VITAMIN D) 2000 units tablet Take 1 tablet by mouth daily      Coenzyme Q10 (COQ-10) 200 MG CAPS Take by mouth      enalapril (VASOTEC) 20 mg tablet take 1 tablet by mouth twice a day 90 tablet 7    FARXIGA 10 MG TABS take 1 tablet by mouth once daily 90 tablet 0    glucose blood test strip by In Vitro route      insulin degludec (TRESIBA FLEXTOUCH) 200 units/mL CONCENTRATED U-200 injection pen Inject 50 Units under the skin daily at bedtime for 180 days 8 pen 1    Insulin Pen Needle (B-D ULTRAFINE III SHORT PEN) 31G X 8 MM MISC by Does not apply route      metFORMIN (GLUCOPHAGE) 1000 MG tablet take 1 tablet by mouth twice a day with meals 180 tablet 1    nystatin (MYCOSTATIN) powder apply to affected area twice a day 60 g 1    ONETOUCH DELICA LANCETS FINE MISC by Does not apply route      rosuvastatin (CRESTOR) 20 MG tablet take 1 tablet by mouth once daily 30 tablet 11    sildenafil (VIAGRA) 100 mg tablet Take 1 tablet (100 mg total) by mouth daily as needed for erectile dysfunction 10 tablet 5    spironolactone (ALDACTONE) 25 mg tablet take 1 tablet by mouth once daily 30 tablet 5    VASCEPA 1 g CAPS take 2 capsules by mouth twice a day 360 capsule 3    clindamycin (CLEOCIN) 300 MG capsule Take 1 capsule (300 mg total) by mouth 4 (four) times a day for 10 days 40 capsule 0    traMADol (ULTRAM) 50 mg tablet Take 1 tablet (50 mg total) by mouth every 6 (six) hours as needed for moderate pain 30 tablet 0     No current facility-administered medications for this visit  Objective:    /100   Pulse 100   Temp 98 7 °F (37 1 °C)   Resp 16   Ht 5' 10" (1 778 m)   Wt (!) 143 kg (316 lb)   SpO2 94%   BMI 45 34 kg/m²        Physical Exam   Constitutional: He appears well-developed and well-nourished  No distress  HENT:   Head: Normocephalic and atraumatic  Right Ear: External ear normal    Left Ear: External ear normal    Nose: Nose normal    Mouth/Throat: Oropharynx is clear and moist  No oropharyngeal exudate  Eyes: Pupils are equal, round, and reactive to light  EOM are normal  Right eye exhibits no discharge  Left eye exhibits no discharge  No scleral icterus  Neck: No thyromegaly present  Cardiovascular: Normal rate and normal heart sounds  No murmur heard  Pulmonary/Chest: Effort normal and breath sounds normal  No respiratory distress  He has no wheezes  Abdominal: Soft  Bowel sounds are normal  He exhibits no distension and no mass  There is no tenderness  There is no rebound and no guarding  Musculoskeletal: Normal range of motion  Neurological: He is alert  He displays normal reflexes  Coordination normal    Skin: Skin is warm and dry  No rash noted  He is not diaphoretic  No erythema  Psychiatric: He has a normal mood and affect  His behavior is normal    Nursing note and vitals reviewed               Devorah French DO

## 2020-01-31 NOTE — ASSESSMENT & PLAN NOTE
Given the diabetes I will hold off on steroids for now  Lab Results   Component Value Date    HGBA1C 7 7 (H) 09/03/2019

## 2020-02-12 NOTE — PROGRESS NOTES
Weight Management Medical Nutrition Assessment  Juan Oro presented for the [employer] with the Healthy [facility] Program   Today's weight is 314 8#   Per dietary recall patient consumes excess calories from large portions at dinnertime and mindless snacking at the end of the day  He admits that he never limits the amount he eats at meals and has a "sweet tooth"  Pt has T2DM, does not check blood sugars (on insulin), most recent alc% is 7 7  Explained the importance of checking blood sugars and pairing carb foods with protein foods to help manage blood sugars  Recommend 1358-5736 kcal/day  Does not exercise, but joining  gym today  Reviewed myfitnesspal and importance of measuring/weighing food for accuracy       Anthropometric Measurements  Start Weight (#): 314 8# (2/17)  Current Weight (#): n/a  TBW % Change from start weight: n/a  Ideal Body Weight (#): 161#  Goal Weight (#): 250#    Weight Loss History  Previous weight loss attempts: Exercise  H/o T2DM (x5 years)-uses insulin, doesn't check blood sugars, encouraged to check 2x/day    Food and Nutrition Related History  Wake up: 7am  Bed Time: 11pm    Food Recall  Breakfast: (9am): coffee with flavors creamer and oatmeal packet with water; on weekends larson/eggs    Snack: (11am): banana or yogurt  OIIFO:(2VM): sandwich (4 slices ham/1 slice cheese, white bread) with chips or cheese ghada from vending machine; coffee with creamer  Snack: (3-4pm): chips or cheese ghada; tasty cake sometimes  Dinner: (7-8pm): a lot of beef, vegetables, mashed potatoes; burgers; pizza   Snack: junk food- chips, chocolate/cakes      Beverages: water, sugar free beverages, coffee/tea and decaf coffee/tea  Volume of beverage intake: 3-4 bottles water; iced tea; 2-3 cups coffee    Weekends: Worse, heavier meals, goes out to eat more often  Cravings: sweets/chocolate  Trouble area of day: evening, snacking    Frequency of Eating out: 1-2x/wk (fast food-[employer]; [employer], [employer])  Food restrictions: none  Cooking: self   Food Shopping: self    Physical Activity Intake  Activity: none, but plans to join [facility] today  Frequency:n/a  Physical limitations/barriers to exercise: none    Estimated Needs  Energy  [employer] Energy Needs: BMR : 2294 kcal     1-2# loss weekly sedentary:  6930-1212 kcal               1-2# loss weekly lightly active: 2536-3438 kcal  Protein: 102-128 grams      (1 2-1 5g/kg IBW)  Fluid: 2975 ml     (35mL/kg IBW)    Nutrition Diagnosis  Yes;     Overweight/obesity  related to Excess energy intake as evidenced by  BMI more than normative standard for age and sex (obesity-grade III 36+)       Nutrition Intervention  Nutrition Prescription  Calories: 4484-4373  Protein: 102-128 grams  Fluid: 99oz    Meal Plan (Lon/Pro/Carb)  Breakfast:300/27  Snack:100/15  Lunch:500/30  Snack:200/5-15  Dinner:500/30  Snack:160/15    Nutrition Education:    Healthy Core Manual  Calorie controlled menu  Lean protein food choices  Healthy snack options  Food journaling tips      Nutrition Counseling:  Strategies: meal planning, portion sizes, healthy snack choices, hydration, fiber intake, protein intake, exercise, food journal      Monitoring and Evaluation:  Evaluation criteria:  Energy Intake  Meet protein needs  Maintain adequate hydration  Monitor weekly weight  Meal planning/preparation  Food journal   Decreased portions at mealtimes and snacks  Physical activity     Barriers to learning:none  Readiness to change: Preparation:  (Getting ready to change)   Comprehension: very good  Expected Compliance: very good

## 2020-02-17 ENCOUNTER — OFFICE VISIT (OUTPATIENT)
Dept: BARIATRICS | Facility: CLINIC | Age: 50
End: 2020-02-17

## 2020-02-17 VITALS — BODY MASS INDEX: 45.07 KG/M2 | HEIGHT: 70 IN | WEIGHT: 314.8 LBS

## 2020-02-17 DIAGNOSIS — R63.5 ABNORMAL WEIGHT GAIN: ICD-10-CM

## 2020-02-17 PROCEDURE — WMPRO12

## 2020-02-17 PROCEDURE — RECHECK

## 2020-02-19 DIAGNOSIS — I50.20 SYSTOLIC HEART FAILURE (HCC): ICD-10-CM

## 2020-02-20 RX ORDER — SPIRONOLACTONE 25 MG/1
TABLET ORAL
Qty: 30 TABLET | Refills: 5 | Status: SHIPPED | OUTPATIENT
Start: 2020-02-20 | End: 2020-09-16 | Stop reason: SDUPTHER

## 2020-02-24 LAB
ALBUMIN SERPL-MCNC: 4.3 G/DL (ref 3.6–5.1)
ALBUMIN/CREAT UR: 22 MCG/MG CREAT
ALBUMIN/GLOB SERPL: 1.6 (CALC) (ref 1–2.5)
ALP SERPL-CCNC: 82 U/L (ref 35–144)
ALT SERPL-CCNC: 25 U/L (ref 9–46)
AST SERPL-CCNC: 16 U/L (ref 10–35)
BILIRUB SERPL-MCNC: 0.7 MG/DL (ref 0.2–1.2)
BUN SERPL-MCNC: 14 MG/DL (ref 7–25)
BUN/CREAT SERPL: ABNORMAL (CALC) (ref 6–22)
CALCIUM SERPL-MCNC: 9.6 MG/DL (ref 8.6–10.3)
CHLORIDE SERPL-SCNC: 104 MMOL/L (ref 98–110)
CO2 SERPL-SCNC: 29 MMOL/L (ref 20–32)
CREAT SERPL-MCNC: 0.74 MG/DL (ref 0.7–1.33)
CREAT UR-MCNC: 223 MG/DL (ref 20–320)
GLOBULIN SER CALC-MCNC: 2.7 G/DL (CALC) (ref 1.9–3.7)
GLUCOSE SERPL-MCNC: 108 MG/DL (ref 65–99)
HBA1C MFR BLD: 7.4 % OF TOTAL HGB
MICROALBUMIN UR-MCNC: 5 MG/DL
POTASSIUM SERPL-SCNC: 4.8 MMOL/L (ref 3.5–5.3)
PROT SERPL-MCNC: 7 G/DL (ref 6.1–8.1)
SL AMB EGFR AFRICAN AMERICAN: 125 ML/MIN/1.73M2
SL AMB EGFR NON AFRICAN AMERICAN: 108 ML/MIN/1.73M2
SODIUM SERPL-SCNC: 140 MMOL/L (ref 135–146)

## 2020-02-24 PROCEDURE — 3061F NEG MICROALBUMINURIA REV: CPT | Performed by: FAMILY MEDICINE

## 2020-02-25 ENCOUNTER — CLINICAL SUPPORT (OUTPATIENT)
Dept: BARIATRICS | Facility: CLINIC | Age: 50
End: 2020-02-25

## 2020-02-25 VITALS — BODY MASS INDEX: 42.19 KG/M2 | WEIGHT: 311.5 LBS | HEIGHT: 72 IN

## 2020-02-25 DIAGNOSIS — R63.5 ABNORMAL WEIGHT GAIN: Primary | ICD-10-CM

## 2020-02-25 PROCEDURE — RECHECK

## 2020-02-26 ENCOUNTER — OFFICE VISIT (OUTPATIENT)
Dept: FAMILY MEDICINE CLINIC | Facility: CLINIC | Age: 50
End: 2020-02-26
Payer: COMMERCIAL

## 2020-02-26 VITALS
WEIGHT: 312 LBS | HEIGHT: 72 IN | BODY MASS INDEX: 42.26 KG/M2 | HEART RATE: 81 BPM | DIASTOLIC BLOOD PRESSURE: 80 MMHG | SYSTOLIC BLOOD PRESSURE: 110 MMHG | OXYGEN SATURATION: 95 % | TEMPERATURE: 97.9 F | RESPIRATION RATE: 16 BRPM

## 2020-02-26 DIAGNOSIS — I11.0 MALIGNANT HYPERTENSION WITH SYSTOLIC CHF, NYHA CLASS 2 (HCC): ICD-10-CM

## 2020-02-26 DIAGNOSIS — E66.01 MORBID OBESITY WITH BMI OF 40.0-44.9, ADULT (HCC): ICD-10-CM

## 2020-02-26 DIAGNOSIS — E11.42 DIABETIC POLYNEUROPATHY ASSOCIATED WITH TYPE 2 DIABETES MELLITUS (HCC): Primary | ICD-10-CM

## 2020-02-26 DIAGNOSIS — I50.20 MALIGNANT HYPERTENSION WITH SYSTOLIC CHF, NYHA CLASS 2 (HCC): ICD-10-CM

## 2020-02-26 DIAGNOSIS — Z11.4 SCREENING FOR HIV (HUMAN IMMUNODEFICIENCY VIRUS): ICD-10-CM

## 2020-02-26 DIAGNOSIS — E78.2 MIXED HYPERLIPIDEMIA: ICD-10-CM

## 2020-02-26 DIAGNOSIS — Z12.5 SCREENING FOR PROSTATE CANCER: ICD-10-CM

## 2020-02-26 DIAGNOSIS — Z13.6 SCREENING FOR CARDIOVASCULAR CONDITION: ICD-10-CM

## 2020-02-26 PROCEDURE — 99214 OFFICE O/P EST MOD 30 MIN: CPT | Performed by: FAMILY MEDICINE

## 2020-02-26 PROCEDURE — 2022F DILAT RTA XM EVC RTNOPTHY: CPT | Performed by: FAMILY MEDICINE

## 2020-02-26 PROCEDURE — 3008F BODY MASS INDEX DOCD: CPT | Performed by: FAMILY MEDICINE

## 2020-02-26 PROCEDURE — 3079F DIAST BP 80-89 MM HG: CPT | Performed by: FAMILY MEDICINE

## 2020-02-26 PROCEDURE — 3074F SYST BP LT 130 MM HG: CPT | Performed by: FAMILY MEDICINE

## 2020-02-26 PROCEDURE — 1036F TOBACCO NON-USER: CPT | Performed by: FAMILY MEDICINE

## 2020-02-26 NOTE — PROGRESS NOTES
Assessment/Plan:    1  Diabetic polyneuropathy associated with type 2 diabetes mellitus (HCC)  -     Comprehensive metabolic panel; Future; Expected date: 05/11/2020  -     PSA, Total Screen; Future  -     Lipid Panel with Direct LDL reflex; Future; Expected date: 05/11/2020  -     Hemoglobin A1C; Future; Expected date: 05/11/2020    2  Mixed hyperlipidemia  -     Comprehensive metabolic panel; Future; Expected date: 05/11/2020  -     PSA, Total Screen; Future  -     Lipid Panel with Direct LDL reflex; Future; Expected date: 05/11/2020  -     Hemoglobin A1C; Future; Expected date: 05/11/2020    3  Malignant hypertension with systolic CHF, NYHA class 2 ([facility] [address] )    4  Morbid obesity with BMI of 40 0-44 9, adult ([facility] )    5  Screening for HIV (human immunodeficiency virus)  -     [facility]- Human Immunodeficiency Virus 1/2 Antigen / Antibody ( Fourth Generation) with Reflex Testing; Future    6  Screening for cardiovascular condition    7  Screening for prostate cancer  -     PSA, Total Screen; Future            Patient Instructions     Obesity   AMBULATORY CARE:   Obesity  is when your body mass index (BMI) is greater than 30  Your healthcare provider will use your height and weight to measure your BMI  The risks of obesity include  many health problems, such as injuries or physical disability  You may need tests to check for the following:  · Diabetes     · High blood pressure or high cholesterol     · Heart disease     · Gallbladder or liver disease     · Cancer of the colon, breast, prostate, liver, or kidney     · Sleep apnea     · Arthritis or gout  Seek care immediately if:   · You have a severe headache, confusion, or difficulty speaking  · You have weakness on one side of your body  · You have chest pain, sweating, or shortness of breath  Contact your healthcare provider if:   · You have symptoms of gallbladder or liver disease, such as pain in your upper abdomen      · You have knee or hip pain and discomfort while walking  · You have symptoms of diabetes, such as intense hunger and thirst, and frequent urination  · You have symptoms of sleep apnea, such as snoring or daytime sleepiness  · You have questions or concerns about your condition or care  Treatment for obesity  focuses on helping you lose weight to improve your health  Even a small decrease in BMI can reduce the risk for many health problems  Your healthcare provider will help you set a weight-loss goal   · Lifestyle changes  are the first step in treating obesity  These include making healthy food choices and getting regular physical activity  Your healthcare provider may suggest a weight-loss program that involves coaching, education, and therapy  · Medicine  may help you lose weight when it is used with a healthy diet and physical activity  · Surgery  can help you lose weight if you are very obese and have other health problems  There are several types of weight-loss surgery  Ask your healthcare provider for more information  Be successful losing weight:   · Set small, realistic goals  An example of a small goal is to walk for 20 minutes 5 days a week  Anther goal is to lose 5% of your body weight  · Tell friends, family members, and coworkers about your goals  and ask for their support  Ask a friend to lose weight with you, or join a weight-loss support group  · Identify foods or triggers that may cause you to overeat , and find ways to avoid them  Remove tempting high-calorie foods from your home and workplace  Place a bowl of fresh fruit on your kitchen counter  If stress causes you to eat, then find other ways to cope with stress  · Keep a diary to track what you eat and drink  Also write down how many minutes of physical activity you do each day  Weigh yourself once a week and record it in your diary  Eating changes:   You will need to eat 500 to 1,000 fewer calories each day than you currently eat to lose 1 to 2 pounds a week  The following changes will help you cut calories:  · Eat smaller portions  Use small plates, no larger than 9 inches in diameter  Fill your plate half full of fruits and vegetables  Measure your food using measuring cups until you know what a serving size looks like  · Eat 3 meals and 1 or 2 snacks each day  Plan your meals in advance  Josy Bath and eat at home most of the time  Eat slowly  · Eat fruits and vegetables at every meal   They are low in calories and high in fiber, which makes you feel full  Do not add butter, margarine, or cream sauce to vegetables  Use herbs to season steamed vegetables  · Eat less fat and fewer fried foods  Eat more baked or grilled chicken and fish  These protein sources are lower in calories and fat than red meat  Limit fast food  Dress your salads with olive oil and vinegar instead of bottled dressing  · Limit the amount of sugar you eat  Do not drink sugary beverages  Limit alcohol  Activity changes:  Physical activity is good for your body in many ways  It helps you burn calories and build strong muscles  It decreases stress and depression, and improves your mood  It can also help you sleep better  Talk to your healthcare provider before you begin an exercise program   · Exercise for at least 30 minutes 5 days a week  Start slowly  Set aside time each day for physical activity that you enjoy and that is convenient for you  It is best to do both weight training and an activity that increases your heart rate, such as walking, bicycling, or swimming  · Find ways to be more active  Do yard work and housecleaning  Walk up the stairs instead of using elevators  Spend your leisure time going to events that require walking, such as outdoor festivals or fairs  This extra physical activity can help you lose weight and keep it off  Follow up with your healthcare provider as directed: You may need to meet with a dietitian  Write down your questions so you remember to ask them during your visits  © 2017 [address] Information is for End User's use only and may not be sold, redistributed or otherwise used for commercial purposes  All illustrations and images included in CareNotes® are the copyrighted property of [employer]  or [employer]  The above information is an  only  It is not intended as medical advice for individual conditions or treatments  Talk to your doctor, nurse or pharmacist before following any medical regimen to see if it is safe and effective for you  Return in about 4 months (around 6/26/2020) for Annual physical     Subjective:      Patient ID: Akila Valencia is a 47 y o  male  Chief Complaint   Patient presents with    Follow-up     diabetic check-Review Ananda Farrell       Pt is here to follow up labs  [facility]  Pt denies polyuria and polydipsia  Pt states the numbness in huis feet is rare    Pt states last time he was here his face was swollen states he had an abscessed tooth was drilled by dentist      The following portions of the patient's history were reviewed and updated as appropriate: allergies, current medications, past family history, past medical history, past social history, past surgical history and problem list     Review of Systems   Constitutional: Negative for activity change, appetite change, chills, diaphoresis, fatigue, fever and unexpected weight change  HENT: Negative for congestion, dental problem, ear pain, mouth sores, sinus pressure, sinus pain, sore throat and trouble swallowing  Eyes: Negative for photophobia, discharge and itching  Respiratory: Negative for apnea, chest tightness and shortness of breath  Cardiovascular: Negative for chest pain, palpitations and leg swelling     Gastrointestinal: Negative for abdominal distention, abdominal pain, blood in stool, nausea and vomiting  Endocrine: Negative for cold intolerance, heat intolerance, polydipsia, polyphagia and polyuria  Genitourinary: Negative for difficulty urinating  Musculoskeletal: Negative for arthralgias  Skin: Negative for color change and wound  Neurological: Negative for dizziness, syncope, speech difficulty and headaches  Hematological: Negative for adenopathy  Psychiatric/Behavioral: Negative for agitation and behavioral problems           Current Outpatient Medications   Medication Sig Dispense Refill    amLODIPine (NORVASC) 5 mg tablet take 1 tablet by mouth once daily 90 tablet 3    B-D ULTRAFINE III SHORT PEN 31G X 8 MM MISC use as directed BY  each 2    carvedilol (COREG) 25 mg tablet take 1 tablet by mouth twice a day with food 180 tablet 3    Cholecalciferol (VITAMIN D) 2000 units tablet Take 1 tablet by mouth daily      Coenzyme Q10 (COQ-10) 200 MG CAPS Take by mouth      enalapril (VASOTEC) 20 mg tablet take 1 tablet by mouth twice a day 90 tablet 7    FARXIGA 10 MG TABS take 1 tablet by mouth once daily 90 tablet 0    glucose blood test strip by In Vitro route      insulin degludec (TRESIBA FLEXTOUCH) 200 units/mL CONCENTRATED U-200 injection pen Inject 50 Units under the skin daily at bedtime for 180 days 8 pen 1    Insulin Pen Needle (B-D ULTRAFINE III SHORT PEN) 31G X 8 MM MISC by Does not apply route      metFORMIN (GLUCOPHAGE) 1000 MG tablet take 1 tablet by mouth twice a day with meals 180 tablet 1    nystatin (MYCOSTATIN) powder apply to affected area twice a day 60 g 1    ONETOUCH DELICA LANCETS FINE MISC by Does not apply route      rosuvastatin (CRESTOR) 20 MG tablet take 1 tablet by mouth once daily 30 tablet 11    sildenafil (VIAGRA) 100 mg tablet Take 1 tablet (100 mg total) by mouth daily as needed for erectile dysfunction 10 tablet 5    spironolactone (ALDACTONE) 25 mg tablet take 1 tablet by mouth once daily 30 tablet 5    traMADol (ULTRAM) 50 mg tablet Take 1 tablet (50 mg total) by mouth every 6 (six) hours as needed for moderate pain 30 tablet 0    VASCEPA 1 g CAPS take 2 capsules by mouth twice a day 360 capsule 3     No current facility-administered medications for this visit  Objective:    /80   Pulse 81   Temp 97 9 °F (36 6 °C)   Resp 16   Ht 6' (1 829 m)   Wt (!) 142 kg (312 lb)   SpO2 95%   BMI 42 31 kg/m²        Physical Exam   Constitutional: He appears well-developed and well-nourished  No distress  HENT:   Head: Normocephalic and atraumatic  Right Ear: External ear normal    Left Ear: External ear normal    Nose: Nose normal    Mouth/Throat: Oropharynx is clear and moist  No oropharyngeal exudate  Eyes: Pupils are equal, round, and reactive to light  EOM are normal  Right eye exhibits no discharge  Left eye exhibits no discharge  No scleral icterus  Neck: No thyromegaly present  Cardiovascular: Normal rate and normal heart sounds  Pulses are no weak pulses  No murmur heard  Pulses:       Dorsalis pedis pulses are 2+ on the right side, and 2+ on the left side  Posterior tibial pulses are 2+ on the right side, and 2+ on the left side  Pulmonary/Chest: Effort normal and breath sounds normal  No respiratory distress  He has no wheezes  Abdominal: Soft  Bowel sounds are normal  He exhibits no distension and no mass  There is no tenderness  There is no rebound and no guarding  Musculoskeletal: Normal range of motion  Feet:   Right Foot:   Skin Integrity: Negative for ulcer, skin breakdown, erythema, warmth, callus or dry skin  Left Foot:   Skin Integrity: Negative for ulcer, skin breakdown, erythema, warmth, callus or dry skin  Neurological: He is alert  He displays normal reflexes  Coordination normal    Skin: Skin is warm and dry  No rash noted  He is not diaphoretic  No erythema  Psychiatric: He has a normal mood and affect  His behavior is normal    Nursing note and vitals reviewed  Diabetic Foot Exam    Patient's shoes and socks removed  Right Foot/Ankle   Right Foot Inspection  Skin Exam: skin normal skin not intact, no dry skin, no warmth, no callus, no erythema, no maceration, no abnormal color, no pre-ulcer, no ulcer and no callus                          Toe Exam: ROM and strength within normal limits  Sensory   Vibration: intact  Proprioception: intact   Monofilament testing: intact  Vascular  Capillary refills: < 3 seconds  The right DP pulse is 2+  The right PT pulse is 2+  Left Foot/Ankle  Left Foot Inspection  Skin Exam: skin normalskin not intact, no dry skin, no warmth, no erythema, no maceration, normal color, no pre-ulcer, no ulcer and no callus                         Toe Exam: ROM and strength within normal limits                   Sensory   Vibration: intact  Proprioception: intact  Monofilament: intact  Vascular  Capillary refills: < 3 seconds  The left DP pulse is 2+  The left PT pulse is 2+  Assign Risk Category:  No deformity present; No loss of protective sensation; No weak pulses       Risk: 0    Recent Results (from the past 672 hour(s))   Microalbumin, Random Urine (W/Creatinine)    Collection Time: 02/22/20 10:08 AM   Result Value Ref Range    Creatinine, Urine 223 20 - 320 mg/dL    Microalbum  ,U,Random 5 0 See Note: mg/dL    Microalb/Creat Ratio 22 <30 mcg/mg creat   Comprehensive metabolic panel    Collection Time: 02/22/20 10:08 AM   Result Value Ref Range    Glucose, Random 108 (H) 65 - 99 mg/dL    BUN 14 7 - 25 mg/dL    Creatinine 0 74 0 70 - 1 33 mg/dL    eGFR Non  108 > OR = 60 mL/min/1 73m2    eGFR  125 > OR = 60 mL/min/1 73m2    SL AMB BUN/CREATININE RATIO NOT APPLICABLE 6 - 22 (calc)    Sodium 140 135 - 146 mmol/L    Potassium 4 8 3 5 - 5 3 mmol/L    Chloride 104 98 - 110 mmol/L    CO2 29 20 - 32 mmol/L    SL AMB CALCIUM 9 6 8 6 - 10 3 mg/dL    Protein, Total 7 0 6 1 - 8 1 g/dL    Albumin 4 3 3 6 - 5 1 g/dL    Globulin 2 7 1 9 - 3 7 g/dL (calc)    Albumin/Globulin Ratio 1 6 1 0 - 2 5 (calc)    TOTAL BILIRUBIN 0 7 0 2 - 1 2 mg/dL    Alkaline Phosphatase 82 35 - 144 U/L    AST 16 10 - 35 U/L    ALT 25 9 - 46 U/L   Hemoglobin A1c (w/out EAG)    Collection Time: 02/22/20 10:08 AM   Result Value Ref Range    Hemoglobin A1C 7 4 (H) <5 7 % of total Hgb         Xi Grain, DO  BMI Counseling: Body mass index is 42 31 kg/m²  The BMI is above normal  Nutrition recommendations include reducing portion sizes

## 2020-02-26 NOTE — PATIENT INSTRUCTIONS
Obesity   AMBULATORY CARE:   Obesity  is when your body mass index (BMI) is greater than 30  Your healthcare provider will use your height and weight to measure your BMI  The risks of obesity include  many health problems, such as injuries or physical disability  You may need tests to check for the following:  · Diabetes     · High blood pressure or high cholesterol     · Heart disease     · Gallbladder or liver disease     · Cancer of the colon, breast, prostate, liver, or kidney     · Sleep apnea     · Arthritis or gout  Seek care immediately if:   · You have a severe headache, confusion, or difficulty speaking  · You have weakness on one side of your body  · You have chest pain, sweating, or shortness of breath  Contact your healthcare provider if:   · You have symptoms of gallbladder or liver disease, such as pain in your upper abdomen  · You have knee or hip pain and discomfort while walking  · You have symptoms of diabetes, such as intense hunger and thirst, and frequent urination  · You have symptoms of sleep apnea, such as snoring or daytime sleepiness  · You have questions or concerns about your condition or care  Treatment for obesity  focuses on helping you lose weight to improve your health  Even a small decrease in BMI can reduce the risk for many health problems  Your healthcare provider will help you set a weight-loss goal   · Lifestyle changes  are the first step in treating obesity  These include making healthy food choices and getting regular physical activity  Your healthcare provider may suggest a weight-loss program that involves coaching, education, and therapy  · Medicine  may help you lose weight when it is used with a healthy diet and physical activity  · Surgery  can help you lose weight if you are very obese and have other health problems  There are several types of weight-loss surgery  Ask your healthcare provider for more information    Be successful losing weight:   · Set small, realistic goals  An example of a small goal is to walk for 20 minutes 5 days a week  Anther goal is to lose 5% of your body weight  · Tell friends, family members, and coworkers about your goals  and ask for their support  Ask a friend to lose weight with you, or join a weight-loss support group  · Identify foods or triggers that may cause you to overeat , and find ways to avoid them  Remove tempting high-calorie foods from your home and workplace  Place a bowl of fresh fruit on your kitchen counter  If stress causes you to eat, then find other ways to cope with stress  · Keep a diary to track what you eat and drink  Also write down how many minutes of physical activity you do each day  Weigh yourself once a week and record it in your diary  Eating changes: You will need to eat 500 to 1,000 fewer calories each day than you currently eat to lose 1 to 2 pounds a week  The following changes will help you cut calories:  · Eat smaller portions  Use small plates, no larger than 9 inches in diameter  Fill your plate half full of fruits and vegetables  Measure your food using measuring cups until you know what a serving size looks like  · Eat 3 meals and 1 or 2 snacks each day  Plan your meals in advance  Chary Cortez and eat at home most of the time  Eat slowly  · Eat fruits and vegetables at every meal   They are low in calories and high in fiber, which makes you feel full  Do not add butter, margarine, or cream sauce to vegetables  Use herbs to season steamed vegetables  · Eat less fat and fewer fried foods  Eat more baked or grilled chicken and fish  These protein sources are lower in calories and fat than red meat  Limit fast food  Dress your salads with olive oil and vinegar instead of bottled dressing  · Limit the amount of sugar you eat  Do not drink sugary beverages  Limit alcohol  Activity changes:  Physical activity is good for your body in many ways   It helps you burn calories and build strong muscles  It decreases stress and depression, and improves your mood  It can also help you sleep better  Talk to your healthcare provider before you begin an exercise program   · Exercise for at least 30 minutes 5 days a week  Start slowly  Set aside time each day for physical activity that you enjoy and that is convenient for you  It is best to do both weight training and an activity that increases your heart rate, such as walking, bicycling, or swimming  · Find ways to be more active  Do yard work and housecleaning  Walk up the stairs instead of using elevators  Spend your leisure time going to events that require walking, such as outdoor festivals or fairs  This extra physical activity can help you lose weight and keep it off  Follow up with your healthcare provider as directed: You may need to meet with a dietitian  Write down your questions so you remember to ask them during your visits  © 2017 [address] [employer] Information is for End User's use only and may not be sold, redistributed or otherwise used for commercial purposes  All illustrations and images included in CareNotes® are the copyrighted property of A D A M , Inc  or [employer]ian [employer]  The above information is an  only  It is not intended as medical advice for individual conditions or treatments  Talk to your doctor, nurse or pharmacist before following any medical regimen to see if it is safe and effective for you

## 2020-02-27 NOTE — PROGRESS NOTES
Weight Management Medical Nutrition Assessment  Tiff Herrera presented for two wk f/u session with the [facility]   Today's weight is 305 3#  He reports being very amazing how much he must have been eating prior to program  He has been tracking using [employer]; ranging ~1750 kcal  He reports prepping out a bunch of different snacks (nuts, wheat thins, pretzels) in serving in little snack packets  Continue to recommend 5888-8619 kcal/day, can go up to ~2000 kcal on more intense exercise days  He finds the new direction products very helpful  Had questions regarding OTC protein shakes/bars as well  SECA scan administered  Results provided and explained  Anthropometric Measurements  Start Weight (#): 314 8# (2/17)  Current Weight (#): 9 5#  TBW % Change from start weight: 3%  Ideal Body Weight (#): 161#  Goal Weight (#): 250#     Weight Loss History  Previous weight loss attempts: Exercise  H/o T2DM (x5 years)-uses insulin, doesn't check blood sugars, encouraged to check 2x/day     Food and Nutrition Related History  Wake up: 7am  Bed Time: 11pm     Food Recall  Breakfast: (9am): [employer] shakes; weekends eggs and toast                 Snack: (11am): 1/2 banana  Lunch:(1pm): sandwich (3 oz turkey; 2 slices diet bread), serving pretzels or wheat thins;   Snack: (3-4pm): 1/2 banana or apple  Dinner: (7-8pm): chicken or turkey with vegetables  Snack: protein bar and/or serving nuts        Beverages: water, sugar free beverages, coffee/tea and decaf coffee/tea  Volume of beverage intake: 3-4 bottles water; iced tea; 2-3 cups coffee     Weekends: Worse, goes out to eat more often (but making better choices)  Cravings: sweets  Trouble area of day: evening, snacking     Frequency of Eating out: 1-2x/wk (fast food-[employer]; [employer], [employer])  Food restrictions: none  Cooking: self   Food Shopping: self     Physical Activity Intake  Activity: [facility] (cardio or weights; 45 minutes to an 1 hour)  Frequency: (2-3x/wk)  Physical limitations/barriers to exercise: none     Estimated Needs  Energy  Bear [place]ar[place] Energy Needs: BMR : 2294 kcal     1-2# loss weekly sedentary:  8425-2132 kcal               1-2# loss weekly lightly active: 3651-0959 kcal  Protein: 102-128 grams      (1 2-1 5g/kg IBW)  Fluid: 2975 ml     (35mL/kg IBW)     Nutrition Diagnosis  Yes;     Overweight/obesity  related to Excess energy intake as evidenced by  BMI more than normative standard for age and sex (obesity-grade III 36+)     Nutrition Intervention  Nutrition Prescription  Calories: 7221-5314  Protein: 102-128 grams  Fluid: 99oz     Meal Plan (Lon/Pro/Carb)  Breakfast:300/27  Snack:100/15  Lunch:500/30  Snack:200/5-15  Dinner:500/30  Snack:160/15     Nutrition Education:    Healthy [facility] Manual  Calorie controlled menu  Lean protein food choices  Healthy snack options  Food journaling tips        Nutrition Counseling:  Strategies: meal planning, portion sizes, healthy snack choices, hydration, fiber intake, protein intake, exercise, food journal     Monitoring and Evaluation:  Evaluation criteria:  Energy Intake  Meet protein needs  Maintain adequate hydration  Monitor weekly weight  Meal planning/preparation  Food journal   Decreased portions at mealtimes and snacks  Physical activity      Barriers to learning:none  Readiness to change: Action   Comprehension: very good  Expected Compliance: very good

## 2020-03-02 ENCOUNTER — OFFICE VISIT (OUTPATIENT)
Dept: BARIATRICS | Facility: CLINIC | Age: 50
End: 2020-03-02

## 2020-03-02 VITALS — BODY MASS INDEX: 41.35 KG/M2 | WEIGHT: 305.3 LBS | HEIGHT: 72 IN

## 2020-03-02 DIAGNOSIS — R63.5 ABNORMAL WEIGHT GAIN: Primary | ICD-10-CM

## 2020-03-02 PROCEDURE — RECHECK

## 2020-03-03 ENCOUNTER — CLINICAL SUPPORT (OUTPATIENT)
Dept: BARIATRICS | Facility: CLINIC | Age: 50
End: 2020-03-03

## 2020-03-03 VITALS — HEIGHT: 72 IN | BODY MASS INDEX: 41.61 KG/M2 | WEIGHT: 307.2 LBS

## 2020-03-03 DIAGNOSIS — R63.5 ABNORMAL WEIGHT GAIN: Primary | ICD-10-CM

## 2020-03-03 PROCEDURE — RECHECK

## 2020-03-10 ENCOUNTER — CLINICAL SUPPORT (OUTPATIENT)
Dept: BARIATRICS | Facility: CLINIC | Age: 50
End: 2020-03-10

## 2020-03-10 VITALS — WEIGHT: 302.9 LBS | BODY MASS INDEX: 41.03 KG/M2 | HEIGHT: 72 IN

## 2020-03-10 DIAGNOSIS — R63.5 ABNORMAL WEIGHT GAIN: Primary | ICD-10-CM

## 2020-03-10 PROCEDURE — RECHECK

## 2020-03-24 ENCOUNTER — CLINICAL SUPPORT (OUTPATIENT)
Dept: BARIATRICS | Facility: CLINIC | Age: 50
End: 2020-03-24

## 2020-03-24 DIAGNOSIS — R63.5 ABNORMAL WEIGHT GAIN: Primary | ICD-10-CM

## 2020-03-24 PROCEDURE — RECHECK

## 2020-03-31 ENCOUNTER — CLINICAL SUPPORT (OUTPATIENT)
Dept: BARIATRICS | Facility: CLINIC | Age: 50
End: 2020-03-31

## 2020-03-31 DIAGNOSIS — R63.5 ABNORMAL WEIGHT GAIN: Primary | ICD-10-CM

## 2020-03-31 PROCEDURE — RECHECK

## 2020-04-07 ENCOUNTER — CLINICAL SUPPORT (OUTPATIENT)
Dept: BARIATRICS | Facility: CLINIC | Age: 50
End: 2020-04-07

## 2020-04-07 DIAGNOSIS — R63.5 ABNORMAL WEIGHT GAIN: Primary | ICD-10-CM

## 2020-04-07 PROCEDURE — RECHECK

## 2020-04-08 DIAGNOSIS — E11.42 DIABETIC POLYNEUROPATHY ASSOCIATED WITH TYPE 2 DIABETES MELLITUS (HCC): ICD-10-CM

## 2020-04-09 RX ORDER — INSULIN DEGLUDEC 200 U/ML
INJECTION, SOLUTION SUBCUTANEOUS
Qty: 24 ML | Refills: 3 | Status: SHIPPED | OUTPATIENT
Start: 2020-04-09 | End: 2020-11-04 | Stop reason: SDUPTHER

## 2020-04-28 DIAGNOSIS — E11.42 DIABETIC POLYNEUROPATHY ASSOCIATED WITH TYPE 2 DIABETES MELLITUS (HCC): ICD-10-CM

## 2020-04-28 RX ORDER — DAPAGLIFLOZIN 10 MG/1
TABLET, FILM COATED ORAL
Qty: 90 TABLET | Refills: 0 | Status: SHIPPED | OUTPATIENT
Start: 2020-04-28 | End: 2020-08-07

## 2020-04-29 DIAGNOSIS — B35.6 JOCK ITCH: ICD-10-CM

## 2020-04-30 RX ORDER — NYSTATIN 100000 [USP'U]/G
POWDER TOPICAL
Qty: 60 G | Refills: 1 | Status: SHIPPED | OUTPATIENT
Start: 2020-04-30 | End: 2020-09-17

## 2020-05-21 DIAGNOSIS — I10 HTN (HYPERTENSION), MALIGNANT: ICD-10-CM

## 2020-05-21 RX ORDER — CARVEDILOL 25 MG/1
TABLET ORAL
Qty: 180 TABLET | Refills: 3 | Status: SHIPPED | OUTPATIENT
Start: 2020-05-21 | End: 2020-10-01 | Stop reason: SDUPTHER

## 2020-06-29 LAB
ALBUMIN SERPL-MCNC: 4.1 G/DL (ref 3.6–5.1)
ALBUMIN/GLOB SERPL: 1.4 (CALC) (ref 1–2.5)
ALP SERPL-CCNC: 80 U/L (ref 35–144)
ALT SERPL-CCNC: 19 U/L (ref 9–46)
AST SERPL-CCNC: 14 U/L (ref 10–35)
BILIRUB SERPL-MCNC: 0.6 MG/DL (ref 0.2–1.2)
BUN SERPL-MCNC: 13 MG/DL (ref 7–25)
BUN/CREAT SERPL: ABNORMAL (CALC) (ref 6–22)
CALCIUM SERPL-MCNC: 9.2 MG/DL (ref 8.6–10.3)
CHLORIDE SERPL-SCNC: 102 MMOL/L (ref 98–110)
CHOLEST SERPL-MCNC: 117 MG/DL
CHOLEST/HDLC SERPL: 3.5 (CALC)
CO2 SERPL-SCNC: 26 MMOL/L (ref 20–32)
CREAT SERPL-MCNC: 0.88 MG/DL (ref 0.7–1.33)
GLOBULIN SER CALC-MCNC: 2.9 G/DL (CALC) (ref 1.9–3.7)
GLUCOSE SERPL-MCNC: 121 MG/DL (ref 65–99)
HBA1C MFR BLD: 7 % OF TOTAL HGB
HDLC SERPL-MCNC: 33 MG/DL
HIV 1+2 AB+HIV1 P24 AG SERPL QL IA: NORMAL
LDLC SERPL CALC-MCNC: 63 MG/DL (CALC)
NONHDLC SERPL-MCNC: 84 MG/DL (CALC)
POTASSIUM SERPL-SCNC: 4.7 MMOL/L (ref 3.5–5.3)
PROT SERPL-MCNC: 7 G/DL (ref 6.1–8.1)
PSA SERPL-MCNC: 0.5 NG/ML
SL AMB EGFR AFRICAN AMERICAN: 116 ML/MIN/1.73M2
SL AMB EGFR NON AFRICAN AMERICAN: 100 ML/MIN/1.73M2
SODIUM SERPL-SCNC: 139 MMOL/L (ref 135–146)
TRIGL SERPL-MCNC: 126 MG/DL

## 2020-06-29 PROCEDURE — 3051F HG A1C>EQUAL 7.0%<8.0%: CPT | Performed by: FAMILY MEDICINE

## 2020-07-01 ENCOUNTER — OFFICE VISIT (OUTPATIENT)
Dept: FAMILY MEDICINE CLINIC | Facility: CLINIC | Age: 50
End: 2020-07-01
Payer: COMMERCIAL

## 2020-07-01 VITALS
SYSTOLIC BLOOD PRESSURE: 110 MMHG | BODY MASS INDEX: 43.12 KG/M2 | HEART RATE: 111 BPM | OXYGEN SATURATION: 95 % | DIASTOLIC BLOOD PRESSURE: 80 MMHG | WEIGHT: 308 LBS | RESPIRATION RATE: 16 BRPM | HEIGHT: 71 IN | TEMPERATURE: 99.1 F

## 2020-07-01 DIAGNOSIS — Z98.890 STATUS POST CATHETER ABLATION OF ATRIAL FLUTTER: ICD-10-CM

## 2020-07-01 DIAGNOSIS — M54.50 ACUTE RIGHT-SIDED LOW BACK PAIN WITHOUT SCIATICA: ICD-10-CM

## 2020-07-01 DIAGNOSIS — I11.0 MALIGNANT HYPERTENSION WITH SYSTOLIC CHF, NYHA CLASS 2 (HCC): ICD-10-CM

## 2020-07-01 DIAGNOSIS — Z00.00 WELL ADULT EXAM: Primary | ICD-10-CM

## 2020-07-01 DIAGNOSIS — E11.42 DIABETIC POLYNEUROPATHY ASSOCIATED WITH TYPE 2 DIABETES MELLITUS (HCC): ICD-10-CM

## 2020-07-01 DIAGNOSIS — Z12.11 SCREEN FOR COLON CANCER: ICD-10-CM

## 2020-07-01 DIAGNOSIS — I50.20 MALIGNANT HYPERTENSION WITH SYSTOLIC CHF, NYHA CLASS 2 (HCC): ICD-10-CM

## 2020-07-01 DIAGNOSIS — E78.2 MIXED HYPERLIPIDEMIA: ICD-10-CM

## 2020-07-01 PROCEDURE — 3008F BODY MASS INDEX DOCD: CPT | Performed by: FAMILY MEDICINE

## 2020-07-01 PROCEDURE — 3074F SYST BP LT 130 MM HG: CPT | Performed by: FAMILY MEDICINE

## 2020-07-01 PROCEDURE — 3079F DIAST BP 80-89 MM HG: CPT | Performed by: FAMILY MEDICINE

## 2020-07-01 PROCEDURE — 99396 PREV VISIT EST AGE 40-64: CPT | Performed by: FAMILY MEDICINE

## 2020-07-01 RX ORDER — CYCLOBENZAPRINE HCL 10 MG
10 TABLET ORAL
Qty: 21 TABLET | Refills: 0 | Status: SHIPPED | OUTPATIENT
Start: 2020-07-01 | End: 2021-04-14

## 2020-08-07 DIAGNOSIS — I10 CHRONIC HYPERTENSION: ICD-10-CM

## 2020-08-07 DIAGNOSIS — E11.42 DIABETIC POLYNEUROPATHY ASSOCIATED WITH TYPE 2 DIABETES MELLITUS (HCC): ICD-10-CM

## 2020-08-07 RX ORDER — DAPAGLIFLOZIN 10 MG/1
TABLET, FILM COATED ORAL
Qty: 90 TABLET | Refills: 0 | Status: SHIPPED | OUTPATIENT
Start: 2020-08-07 | End: 2020-11-04 | Stop reason: SDUPTHER

## 2020-08-12 DIAGNOSIS — I10 CHRONIC HYPERTENSION: ICD-10-CM

## 2020-08-13 RX ORDER — AMLODIPINE BESYLATE 5 MG/1
TABLET ORAL
Qty: 90 TABLET | Refills: 0 | Status: SHIPPED | OUTPATIENT
Start: 2020-08-13 | End: 2020-10-01 | Stop reason: SDUPTHER

## 2020-08-13 RX ORDER — AMLODIPINE BESYLATE 5 MG/1
5 TABLET ORAL DAILY
Qty: 30 TABLET | Refills: 0 | Status: SHIPPED | OUTPATIENT
Start: 2020-08-13 | End: 2022-04-06

## 2020-08-13 NOTE — TELEPHONE ENCOUNTER
Left message for patient to call and make an appt left message availability on  Monday Aug 24 at 8 am

## 2020-08-24 DIAGNOSIS — E11.42 DIABETIC POLYNEUROPATHY ASSOCIATED WITH TYPE 2 DIABETES MELLITUS (HCC): ICD-10-CM

## 2020-08-24 DIAGNOSIS — I10 CHRONIC HYPERTENSION: ICD-10-CM

## 2020-08-25 PROCEDURE — 4010F ACE/ARB THERAPY RXD/TAKEN: CPT | Performed by: FAMILY MEDICINE

## 2020-08-25 RX ORDER — ENALAPRIL MALEATE 20 MG/1
20 TABLET ORAL 2 TIMES DAILY
Qty: 90 TABLET | Refills: 0 | Status: SHIPPED | OUTPATIENT
Start: 2020-08-25 | End: 2020-10-01 | Stop reason: SDUPTHER

## 2020-09-13 DIAGNOSIS — I50.20 SYSTOLIC HEART FAILURE (HCC): ICD-10-CM

## 2020-09-16 DIAGNOSIS — B35.6 JOCK ITCH: ICD-10-CM

## 2020-09-16 DIAGNOSIS — I50.20 SYSTOLIC HEART FAILURE (HCC): ICD-10-CM

## 2020-09-17 RX ORDER — SPIRONOLACTONE 25 MG/1
TABLET ORAL
Qty: 30 TABLET | Refills: 5 | Status: SHIPPED | OUTPATIENT
Start: 2020-09-17 | End: 2020-10-01 | Stop reason: SDUPTHER

## 2020-09-17 RX ORDER — NYSTATIN 100000 [USP'U]/G
POWDER TOPICAL
Qty: 60 G | Refills: 1 | Status: SHIPPED | OUTPATIENT
Start: 2020-09-17 | End: 2021-10-06 | Stop reason: SDUPTHER

## 2020-09-17 RX ORDER — SPIRONOLACTONE 25 MG/1
25 TABLET ORAL DAILY
Qty: 30 TABLET | Refills: 0 | Status: SHIPPED | OUTPATIENT
Start: 2020-09-17 | End: 2022-01-06

## 2020-09-29 DIAGNOSIS — E11.42 DIABETIC POLYNEUROPATHY ASSOCIATED WITH TYPE 2 DIABETES MELLITUS (HCC): ICD-10-CM

## 2020-10-01 ENCOUNTER — OFFICE VISIT (OUTPATIENT)
Dept: CARDIOLOGY CLINIC | Facility: CLINIC | Age: 50
End: 2020-10-01
Payer: COMMERCIAL

## 2020-10-01 VITALS
HEIGHT: 71 IN | OXYGEN SATURATION: 95 % | SYSTOLIC BLOOD PRESSURE: 148 MMHG | HEART RATE: 77 BPM | WEIGHT: 315 LBS | TEMPERATURE: 98.5 F | BODY MASS INDEX: 44.1 KG/M2 | DIASTOLIC BLOOD PRESSURE: 92 MMHG

## 2020-10-01 DIAGNOSIS — I10 HTN (HYPERTENSION), MALIGNANT: Primary | ICD-10-CM

## 2020-10-01 DIAGNOSIS — E78.5 DYSLIPIDEMIA: ICD-10-CM

## 2020-10-01 DIAGNOSIS — I11.0 MALIGNANT HYPERTENSION WITH SYSTOLIC CHF, NYHA CLASS 2 (HCC): ICD-10-CM

## 2020-10-01 DIAGNOSIS — I50.20 SYSTOLIC HEART FAILURE (HCC): ICD-10-CM

## 2020-10-01 DIAGNOSIS — Z12.11 ENCOUNTER FOR SCREENING COLONOSCOPY: ICD-10-CM

## 2020-10-01 DIAGNOSIS — I87.2 VENOUS INSUFFICIENCY: ICD-10-CM

## 2020-10-01 DIAGNOSIS — I10 CHRONIC HYPERTENSION: ICD-10-CM

## 2020-10-01 DIAGNOSIS — E11.42 DIABETIC POLYNEUROPATHY ASSOCIATED WITH TYPE 2 DIABETES MELLITUS (HCC): ICD-10-CM

## 2020-10-01 DIAGNOSIS — I50.20 MALIGNANT HYPERTENSION WITH SYSTOLIC CHF, NYHA CLASS 2 (HCC): ICD-10-CM

## 2020-10-01 PROCEDURE — 1036F TOBACCO NON-USER: CPT | Performed by: INTERNAL MEDICINE

## 2020-10-01 PROCEDURE — 93000 ELECTROCARDIOGRAM COMPLETE: CPT | Performed by: INTERNAL MEDICINE

## 2020-10-01 PROCEDURE — 4010F ACE/ARB THERAPY RXD/TAKEN: CPT | Performed by: INTERNAL MEDICINE

## 2020-10-01 PROCEDURE — 3080F DIAST BP >= 90 MM HG: CPT | Performed by: INTERNAL MEDICINE

## 2020-10-01 PROCEDURE — 99214 OFFICE O/P EST MOD 30 MIN: CPT | Performed by: INTERNAL MEDICINE

## 2020-10-01 RX ORDER — CARVEDILOL 25 MG/1
25 TABLET ORAL 2 TIMES DAILY WITH MEALS
Qty: 180 TABLET | Refills: 3 | Status: SHIPPED | OUTPATIENT
Start: 2020-10-01 | End: 2021-12-01 | Stop reason: SDUPTHER

## 2020-10-01 RX ORDER — ENALAPRIL MALEATE 20 MG/1
20 TABLET ORAL 2 TIMES DAILY
Qty: 180 EACH | Refills: 2 | Status: SHIPPED | OUTPATIENT
Start: 2020-10-01 | End: 2021-12-01 | Stop reason: SDUPTHER

## 2020-10-01 RX ORDER — CHLORAL HYDRATE 500 MG
2000 CAPSULE ORAL 2 TIMES DAILY
Qty: 60 CAPSULE | Refills: 3
Start: 2020-10-01

## 2020-10-01 RX ORDER — AMLODIPINE BESYLATE 5 MG/1
5 TABLET ORAL DAILY
Qty: 90 EACH | Refills: 2 | Status: SHIPPED | OUTPATIENT
Start: 2020-10-01 | End: 2021-10-12 | Stop reason: SDUPTHER

## 2020-10-01 RX ORDER — SPIRONOLACTONE 25 MG/1
25 TABLET ORAL DAILY
Qty: 90 EACH | Refills: 3 | Status: SHIPPED | OUTPATIENT
Start: 2020-10-01 | End: 2021-04-14

## 2020-10-01 RX ORDER — ROSUVASTATIN CALCIUM 20 MG/1
20 TABLET, COATED ORAL DAILY
Qty: 90 EACH | Refills: 3 | Status: SHIPPED | OUTPATIENT
Start: 2020-10-01 | End: 2021-10-06

## 2020-10-31 LAB
ALBUMIN SERPL-MCNC: 4.2 G/DL (ref 3.6–5.1)
ALBUMIN/GLOB SERPL: 1.6 (CALC) (ref 1–2.5)
ALP SERPL-CCNC: 84 U/L (ref 35–144)
ALT SERPL-CCNC: 21 U/L (ref 9–46)
AST SERPL-CCNC: 14 U/L (ref 10–35)
BILIRUB SERPL-MCNC: 0.6 MG/DL (ref 0.2–1.2)
BUN SERPL-MCNC: 12 MG/DL (ref 7–25)
BUN/CREAT SERPL: ABNORMAL (CALC) (ref 6–22)
CALCIUM SERPL-MCNC: 9.5 MG/DL (ref 8.6–10.3)
CHLORIDE SERPL-SCNC: 102 MMOL/L (ref 98–110)
CO2 SERPL-SCNC: 33 MMOL/L (ref 20–32)
CREAT SERPL-MCNC: 0.8 MG/DL (ref 0.7–1.33)
GLOBULIN SER CALC-MCNC: 2.7 G/DL (CALC) (ref 1.9–3.7)
GLUCOSE SERPL-MCNC: 122 MG/DL (ref 65–99)
HBA1C MFR BLD: 7.4 % OF TOTAL HGB
POTASSIUM SERPL-SCNC: 4.5 MMOL/L (ref 3.5–5.3)
PROT SERPL-MCNC: 6.9 G/DL (ref 6.1–8.1)
SL AMB EGFR AFRICAN AMERICAN: 120 ML/MIN/1.73M2
SL AMB EGFR NON AFRICAN AMERICAN: 103 ML/MIN/1.73M2
SODIUM SERPL-SCNC: 140 MMOL/L (ref 135–146)

## 2020-10-31 PROCEDURE — 3051F HG A1C>EQUAL 7.0%<8.0%: CPT | Performed by: FAMILY MEDICINE

## 2020-11-04 ENCOUNTER — OFFICE VISIT (OUTPATIENT)
Dept: FAMILY MEDICINE CLINIC | Facility: CLINIC | Age: 50
End: 2020-11-04
Payer: COMMERCIAL

## 2020-11-04 VITALS
RESPIRATION RATE: 18 BRPM | WEIGHT: 315 LBS | DIASTOLIC BLOOD PRESSURE: 80 MMHG | SYSTOLIC BLOOD PRESSURE: 136 MMHG | HEART RATE: 73 BPM | OXYGEN SATURATION: 96 % | HEIGHT: 71 IN | BODY MASS INDEX: 44.1 KG/M2 | TEMPERATURE: 97.7 F

## 2020-11-04 DIAGNOSIS — I11.0 MALIGNANT HYPERTENSION WITH SYSTOLIC CHF, NYHA CLASS 2 (HCC): ICD-10-CM

## 2020-11-04 DIAGNOSIS — E11.42 DIABETIC POLYNEUROPATHY ASSOCIATED WITH TYPE 2 DIABETES MELLITUS (HCC): Primary | ICD-10-CM

## 2020-11-04 DIAGNOSIS — Z23 NEED FOR VACCINATION: ICD-10-CM

## 2020-11-04 DIAGNOSIS — E66.01 MORBID OBESITY WITH BMI OF 40.0-44.9, ADULT (HCC): ICD-10-CM

## 2020-11-04 DIAGNOSIS — I50.20 MALIGNANT HYPERTENSION WITH SYSTOLIC CHF, NYHA CLASS 2 (HCC): ICD-10-CM

## 2020-11-04 DIAGNOSIS — E78.2 MIXED HYPERLIPIDEMIA: ICD-10-CM

## 2020-11-04 PROCEDURE — 90471 IMMUNIZATION ADMIN: CPT | Performed by: FAMILY MEDICINE

## 2020-11-04 PROCEDURE — 99214 OFFICE O/P EST MOD 30 MIN: CPT | Performed by: FAMILY MEDICINE

## 2020-11-04 PROCEDURE — 1036F TOBACCO NON-USER: CPT | Performed by: FAMILY MEDICINE

## 2020-11-04 PROCEDURE — 3008F BODY MASS INDEX DOCD: CPT | Performed by: FAMILY MEDICINE

## 2020-11-04 PROCEDURE — 3075F SYST BP GE 130 - 139MM HG: CPT | Performed by: FAMILY MEDICINE

## 2020-11-04 PROCEDURE — 90682 RIV4 VACC RECOMBINANT DNA IM: CPT | Performed by: FAMILY MEDICINE

## 2020-11-04 PROCEDURE — 3079F DIAST BP 80-89 MM HG: CPT | Performed by: FAMILY MEDICINE

## 2020-11-04 PROCEDURE — 3725F SCREEN DEPRESSION PERFORMED: CPT | Performed by: FAMILY MEDICINE

## 2020-11-04 RX ORDER — INSULIN DEGLUDEC 200 U/ML
50 INJECTION, SOLUTION SUBCUTANEOUS
Qty: 8 PEN | Refills: 1 | Status: SHIPPED | OUTPATIENT
Start: 2020-11-04 | End: 2021-04-14 | Stop reason: SDUPTHER

## 2020-11-04 RX ORDER — DAPAGLIFLOZIN 10 MG/1
10 TABLET, FILM COATED ORAL DAILY
Qty: 90 TABLET | Refills: 1 | Status: SHIPPED | OUTPATIENT
Start: 2020-11-04 | End: 2021-08-18

## 2020-11-21 DIAGNOSIS — N52.01 ERECTILE DYSFUNCTION DUE TO ARTERIAL INSUFFICIENCY: ICD-10-CM

## 2020-11-23 RX ORDER — SILDENAFIL 100 MG/1
TABLET, FILM COATED ORAL
Qty: 10 TABLET | Refills: 5 | Status: SHIPPED | OUTPATIENT
Start: 2020-11-23 | End: 2021-12-13

## 2021-01-14 LAB
LEFT EYE DIABETIC RETINOPATHY: NORMAL
RIGHT EYE DIABETIC RETINOPATHY: NORMAL

## 2021-01-20 DIAGNOSIS — E11.42 DIABETIC POLYNEUROPATHY ASSOCIATED WITH TYPE 2 DIABETES MELLITUS (HCC): ICD-10-CM

## 2021-01-20 RX ORDER — PEN NEEDLE, DIABETIC 31 GX5/16"
NEEDLE, DISPOSABLE MISCELLANEOUS 2 TIMES DAILY
Qty: 100 EACH | Refills: 5 | Status: SHIPPED | OUTPATIENT
Start: 2021-01-20

## 2021-03-10 DIAGNOSIS — Z23 ENCOUNTER FOR IMMUNIZATION: ICD-10-CM

## 2021-04-13 LAB
ALBUMIN SERPL-MCNC: 4 G/DL (ref 3.6–5.1)
ALBUMIN/CREAT UR: 211 MCG/MG CREAT
ALBUMIN/GLOB SERPL: 1.4 (CALC) (ref 1–2.5)
ALP SERPL-CCNC: 91 U/L (ref 35–144)
ALT SERPL-CCNC: 26 U/L (ref 9–46)
AST SERPL-CCNC: 17 U/L (ref 10–35)
BILIRUB SERPL-MCNC: 0.5 MG/DL (ref 0.2–1.2)
BUN SERPL-MCNC: 11 MG/DL (ref 7–25)
BUN/CREAT SERPL: 16 (CALC) (ref 6–22)
CALCIUM SERPL-MCNC: 9 MG/DL (ref 8.6–10.3)
CHLORIDE SERPL-SCNC: 103 MMOL/L (ref 98–110)
CHOLEST SERPL-MCNC: 161 MG/DL
CHOLEST/HDLC SERPL: 4.5 (CALC)
CO2 SERPL-SCNC: 30 MMOL/L (ref 20–32)
CREAT SERPL-MCNC: 0.69 MG/DL (ref 0.7–1.33)
CREAT UR-MCNC: 293 MG/DL (ref 20–320)
GLOBULIN SER CALC-MCNC: 2.9 G/DL (CALC) (ref 1.9–3.7)
GLUCOSE SERPL-MCNC: 154 MG/DL (ref 65–99)
HBA1C MFR BLD: 7.3 % OF TOTAL HGB
HDLC SERPL-MCNC: 36 MG/DL
LDLC SERPL CALC-MCNC: 95 MG/DL (CALC)
MICROALBUMIN UR-MCNC: 61.7 MG/DL
NONHDLC SERPL-MCNC: 125 MG/DL (CALC)
POTASSIUM SERPL-SCNC: 4.6 MMOL/L (ref 3.5–5.3)
PROT SERPL-MCNC: 6.9 G/DL (ref 6.1–8.1)
SL AMB EGFR AFRICAN AMERICAN: 127 ML/MIN/1.73M2
SL AMB EGFR NON AFRICAN AMERICAN: 110 ML/MIN/1.73M2
SODIUM SERPL-SCNC: 141 MMOL/L (ref 135–146)
TRIGL SERPL-MCNC: 210 MG/DL

## 2021-04-13 PROCEDURE — 3051F HG A1C>EQUAL 7.0%<8.0%: CPT | Performed by: FAMILY MEDICINE

## 2021-04-13 PROCEDURE — 3060F POS MICROALBUMINURIA REV: CPT | Performed by: FAMILY MEDICINE

## 2021-04-14 ENCOUNTER — OFFICE VISIT (OUTPATIENT)
Dept: FAMILY MEDICINE CLINIC | Facility: CLINIC | Age: 51
End: 2021-04-14
Payer: COMMERCIAL

## 2021-04-14 VITALS
HEIGHT: 71 IN | WEIGHT: 315 LBS | DIASTOLIC BLOOD PRESSURE: 84 MMHG | RESPIRATION RATE: 14 BRPM | HEART RATE: 80 BPM | OXYGEN SATURATION: 95 % | SYSTOLIC BLOOD PRESSURE: 124 MMHG | TEMPERATURE: 94.8 F | BODY MASS INDEX: 44.1 KG/M2

## 2021-04-14 DIAGNOSIS — E11.42 DIABETIC POLYNEUROPATHY ASSOCIATED WITH TYPE 2 DIABETES MELLITUS (HCC): Primary | ICD-10-CM

## 2021-04-14 DIAGNOSIS — Z12.5 SCREENING FOR PROSTATE CANCER: ICD-10-CM

## 2021-04-14 DIAGNOSIS — E66.01 MORBID OBESITY WITH BMI OF 40.0-44.9, ADULT (HCC): ICD-10-CM

## 2021-04-14 DIAGNOSIS — R09.89 PHLEGM IN THROAT: ICD-10-CM

## 2021-04-14 DIAGNOSIS — Z13.6 SCREENING FOR CARDIOVASCULAR CONDITION: ICD-10-CM

## 2021-04-14 DIAGNOSIS — Z12.11 SCREEN FOR COLON CANCER: ICD-10-CM

## 2021-04-14 DIAGNOSIS — E78.2 MIXED HYPERLIPIDEMIA: ICD-10-CM

## 2021-04-14 PROCEDURE — 1036F TOBACCO NON-USER: CPT | Performed by: FAMILY MEDICINE

## 2021-04-14 PROCEDURE — 3008F BODY MASS INDEX DOCD: CPT | Performed by: FAMILY MEDICINE

## 2021-04-14 PROCEDURE — 99214 OFFICE O/P EST MOD 30 MIN: CPT | Performed by: FAMILY MEDICINE

## 2021-04-14 RX ORDER — INSULIN DEGLUDEC 200 U/ML
54 INJECTION, SOLUTION SUBCUTANEOUS
Qty: 8 PEN | Refills: 1 | Status: SHIPPED | OUTPATIENT
Start: 2021-04-14 | End: 2021-10-06 | Stop reason: SDUPTHER

## 2021-04-14 NOTE — PATIENT INSTRUCTIONS
Obesity   AMBULATORY CARE:   Obesity  is when your body mass index (BMI) is greater than 30  Your healthcare provider will use your height and weight to measure your BMI  The risks of obesity include  many health problems, such as injuries or physical disability  You may need tests to check for the following:  · Diabetes    · High blood pressure or high cholesterol    · Heart disease    · Gallbladder or liver disease    · Cancer of the colon, breast, prostate, liver, or kidney    · Sleep apnea    · Arthritis or gout    Seek care immediately if:   · You have a severe headache, confusion, or difficulty speaking  · You have weakness on one side of your body  · You have chest pain, sweating, or shortness of breath  Contact your healthcare provider if:   · You have symptoms of gallbladder or liver disease, such as pain in your upper abdomen  · You have knee or hip pain and discomfort while walking  · You have symptoms of diabetes, such as intense hunger and thirst, and frequent urination  · You have symptoms of sleep apnea, such as snoring or daytime sleepiness  · You have questions or concerns about your condition or care  Treatment for obesity  focuses on helping you lose weight to improve your health  Even a small decrease in BMI can reduce the risk for many health problems  Your healthcare provider will help you set a weight-loss goal   · Lifestyle changes  are the first step in treating obesity  These include making healthy food choices and getting regular physical activity  Your healthcare provider may suggest a weight-loss program that involves coaching, education, and therapy  · Medicine  may help you lose weight when it is used with a healthy diet and physical activity  · Surgery  can help you lose weight if you are very obese and have other health problems  There are several types of weight-loss surgery  Ask your healthcare provider for more information      Be successful losing weight:   · Set small, realistic goals  An example of a small goal is to walk for 20 minutes 5 days a week  Anther goal is to lose 5% of your body weight  · Tell friends, family members, and coworkers about your goals  and ask for their support  Ask a friend to lose weight with you, or join a weight-loss support group  · Identify foods or triggers that may cause you to overeat , and find ways to avoid them  Remove tempting high-calorie foods from your home and workplace  Place a bowl of fresh fruit on your kitchen counter  If stress causes you to eat, then find other ways to cope with stress  · Keep a diary to track what you eat and drink  Also write down how many minutes of physical activity you do each day  Weigh yourself once a week and record it in your diary  Eating changes: You will need to eat 500 to 1,000 fewer calories each day than you currently eat to lose 1 to 2 pounds a week  The following changes will help you cut calories:  · Eat smaller portions  Use small plates, no larger than 9 inches in diameter  Fill your plate half full of fruits and vegetables  Measure your food using measuring cups until you know what a serving size looks like  · Eat 3 meals and 1 or 2 snacks each day  Plan your meals in advance  [employer] and eat at home most of the time  Eat slowly  Do not skip meals  Skipping meals can lead to overeating later in the day  This can make it harder for you to lose weight  Talk with a dietitian to help you make a meal plan and schedule that is right for you  · Eat fruits and vegetables at every meal   They are low in calories and high in fiber, which makes you feel full  Do not add butter, margarine, or cream sauce to vegetables  Use herbs to season steamed vegetables  · Eat less fat and fewer fried foods  Eat more baked or grilled chicken and fish  These protein sources are lower in calories and fat than red meat  Limit fast food   Dress your salads with olive oil and vinegar instead of bottled dressing  · Limit the amount of sugar you eat  Do not drink sugary beverages  Limit alcohol  Activity changes:  Physical activity is good for your body in many ways  It helps you burn calories and build strong muscles  It decreases stress and depression, and improves your mood  It can also help you sleep better  Talk to your healthcare provider before you begin an exercise program   · Exercise for at least 30 minutes 5 days a week  Start slowly  Set aside time each day for physical activity that you enjoy and that is convenient for you  It is best to do both weight training and an activity that increases your heart rate, such as walking, bicycling, or swimming  · Find ways to be more active  Do yard work and housecleaning  Walk up the stairs instead of using elevators  Spend your leisure time going to events that require walking, such as outdoor festivals or fairs  This extra physical activity can help you lose weight and keep it off  Follow up with your healthcare provider as directed: You may need to meet with a dietitian  Write down your questions so you remember to ask them during your visits  © Copyright [facility] [address] Information is for End User's use only and may not be sold, redistributed or otherwise used for commercial purposes  All illustrations and images included in CareNotes® are the copyrighted property of [employer]  or [facility] Zay Jarvis   The above information is an  only  It is not intended as medical advice for individual conditions or treatments  Talk to your doctor, nurse or pharmacist before following any medical regimen to see if it is safe and effective for you

## 2021-04-14 NOTE — PROGRESS NOTES
Assessment/Plan:    1  Diabetic polyneuropathy associated with type 2 diabetes mellitus (HCC)  -     insulin degludec Kemal Montalvo FlexTouch) 200 units/mL CONCENTRATED U-200 injection pen; Inject 54 Units under the skin daily at bedtime  -     Hemoglobin A1C; Future; Expected date: 06/28/2021    2  Mixed hyperlipidemia  -     Comprehensive metabolic panel; Future; Expected date: 06/28/2021    3  Screening for cardiovascular condition    4  Screening for prostate cancer  -     PSA, Total Screen; Future    5  Morbid obesity with BMI of 40 0-44 9, adult ([address] )    6  Screen for colon cancer  -     Ambulatory referral to Gastroenterology; Future    7  Phlegm in throat  -     Allergen Profile, Basic Food; Future            Patient Instructions       Obesity   AMBULATORY CARE:   Obesity  is when your body mass index (BMI) is greater than 30  Your healthcare provider will use your height and weight to measure your BMI  The risks of obesity include  many health problems, such as injuries or physical disability  You may need tests to check for the following:  · Diabetes    · High blood pressure or high cholesterol    · Heart disease    · Gallbladder or liver disease    · Cancer of the colon, breast, prostate, liver, or kidney    · Sleep apnea    · Arthritis or gout    Seek care immediately if:   · You have a severe headache, confusion, or difficulty speaking  · You have weakness on one side of your body  · You have chest pain, sweating, or shortness of breath  Contact your healthcare provider if:   · You have symptoms of gallbladder or liver disease, such as pain in your upper abdomen  · You have knee or hip pain and discomfort while walking  · You have symptoms of diabetes, such as intense hunger and thirst, and frequent urination  · You have symptoms of sleep apnea, such as snoring or daytime sleepiness  · You have questions or concerns about your condition or care      Treatment for obesity  focuses on helping you lose weight to improve your health  Even a small decrease in BMI can reduce the risk for many health problems  Your healthcare provider will help you set a weight-loss goal   · Lifestyle changes  are the first step in treating obesity  These include making healthy food choices and getting regular physical activity  Your healthcare provider may suggest a weight-loss program that involves coaching, education, and therapy  · Medicine  may help you lose weight when it is used with a healthy diet and physical activity  · Surgery  can help you lose weight if you are very obese and have other health problems  There are several types of weight-loss surgery  Ask your healthcare provider for more information  Be successful losing weight:   · Set small, realistic goals  An example of a small goal is to walk for 20 minutes 5 days a week  Anther goal is to lose 5% of your body weight  · Tell friends, family members, and coworkers about your goals  and ask for their support  Ask a friend to lose weight with you, or join a weight-loss support group  · Identify foods or triggers that may cause you to overeat , and find ways to avoid them  Remove tempting high-calorie foods from your home and workplace  Place a bowl of fresh fruit on your kitchen counter  If stress causes you to eat, then find other ways to cope with stress  · Keep a diary to track what you eat and drink  Also write down how many minutes of physical activity you do each day  Weigh yourself once a week and record it in your diary  Eating changes: You will need to eat 500 to 1,000 fewer calories each day than you currently eat to lose 1 to 2 pounds a week  The following changes will help you cut calories:  · Eat smaller portions  Use small plates, no larger than 9 inches in diameter  Fill your plate half full of fruits and vegetables  Measure your food using measuring cups until you know what a serving size looks like  · Eat 3 meals and 1 or 2 snacks each day  Plan your meals in advance  [place] and eat at home most of the time  Eat slowly  Do not skip meals  Skipping meals can lead to overeating later in the day  This can make it harder for you to lose weight  Talk with a dietitian to help you make a meal plan and schedule that is right for you  · Eat fruits and vegetables at every meal   They are low in calories and high in fiber, which makes you feel full  Do not add butter, margarine, or cream sauce to vegetables  Use herbs to season steamed vegetables  · Eat less fat and fewer fried foods  Eat more baked or grilled chicken and fish  These protein sources are lower in calories and fat than red meat  Limit fast food  Dress your salads with olive oil and vinegar instead of bottled dressing  · Limit the amount of sugar you eat  Do not drink sugary beverages  Limit alcohol  Activity changes:  Physical activity is good for your body in many ways  It helps you burn calories and build strong muscles  It decreases stress and depression, and improves your mood  It can also help you sleep better  Talk to your healthcare provider before you begin an exercise program   · Exercise for at least 30 minutes 5 days a week  Start slowly  Set aside time each day for physical activity that you enjoy and that is convenient for you  It is best to do both weight training and an activity that increases your heart rate, such as walking, bicycling, or swimming  · Find ways to be more active  Do yard work and housecleaning  Walk up the stairs instead of using elevators  Spend your leisure time going to events that require walking, such as outdoor festivals or fairs  This extra physical activity can help you lose weight and keep it off  Follow up with your healthcare provider as directed: You may need to meet with a dietitian  Write down your questions so you remember to ask them during your visits    © Copyright [employer] [facility] Information is for Black [employer] White use only and may not be sold, redistributed or otherwise used for commercial purposes  All illustrations and images included in CareNotes® are the copyrighted property of [employer]  or Estevan Strickland  The above information is an  only  It is not intended as medical advice for individual conditions or treatments  Talk to your doctor, nurse or pharmacist before following any medical regimen to see if it is safe and effective for you  Return in about 4 months (around 8/14/2021) for Annual physical     Subjective:      Patient ID: Malcolm Morgan is a 45 y o  male  Chief Complaint   Patient presents with    Follow-up     Hunag Denny  cma       Pt is here to follow up chronic conditions  Pt denioes CP, no SOB  Pt states when he drinks milk or has dairy he gets flemmy  - he would luike to do food allergy testing      The following portions of the patient's history were reviewed and updated as appropriate: allergies, current medications, past family history, past medical history, past social history, past surgical history and problem list     Review of Systems   Constitutional: Negative for activity change, appetite change, chills, diaphoresis, fatigue, fever and unexpected weight change  HENT: Negative for congestion, dental problem, ear pain, mouth sores, sinus pressure, sinus pain, sore throat and trouble swallowing  Eyes: Negative for photophobia, discharge and itching  Respiratory: Negative for apnea, chest tightness and shortness of breath  Cardiovascular: Negative for chest pain, palpitations and leg swelling  Gastrointestinal: Negative for abdominal distention, abdominal pain, blood in stool, nausea and vomiting  Endocrine: Negative for cold intolerance, heat intolerance, polydipsia, polyphagia and polyuria  Genitourinary: Negative for difficulty urinating  Musculoskeletal: Negative for arthralgias     Skin: Negative for color change and wound  Neurological: Negative for dizziness, syncope, speech difficulty and headaches  Hematological: Negative for adenopathy  Psychiatric/Behavioral: Negative for agitation and behavioral problems           Current Outpatient Medications   Medication Sig Dispense Refill    amLODIPine (NORVASC) 5 mg tablet Take 1 tablet (5 mg total) by mouth daily 90 each 2    carvedilol (COREG) 25 mg tablet Take 1 tablet (25 mg total) by mouth 2 (two) times a day with meals 180 tablet 3    Cholecalciferol (VITAMIN D) 2000 units tablet Take 1 tablet by mouth daily      Coenzyme Q10 (COQ-10) 200 MG CAPS Take by mouth      Dapagliflozin Propanediol (Farxiga) 10 MG TABS Take 1 tablet (10 mg total) by mouth daily 90 tablet 1    enalapril (VASOTEC) 20 mg tablet Take 1 tablet (20 mg total) by mouth 2 (two) times a day 180 each 2    glucose blood test strip by In Vitro route      insulin degludec Zavala  FlexTouch) 200 units/mL CONCENTRATED U-200 injection pen Inject 54 Units under the skin daily at bedtime 8 pen 1    Insulin Pen Needle (B-D ULTRAFINE III SHORT PEN) 31G X 8 MM MISC Inject under the skin 2 (two) times a day 100 each 5    metFORMIN (GLUCOPHAGE) 1000 MG tablet Take 1 tablet (1,000 mg total) by mouth 2 (two) times a day with meals 180 tablet 1    nystatin powder apply to affected area twice a day 60 g 1    Omega-3 Fatty Acids (fish oil) 1,000 mg Take 2 capsules (2,000 mg total) by mouth 2 (two) times a day 60 capsule 3    rosuvastatin (CRESTOR) 20 MG tablet Take 1 tablet (20 mg total) by mouth daily 90 each 3    sildenafil (VIAGRA) 100 mg tablet TAKE 1 TABLET BY MOUTH DAILY AS NEEDED FOR ERECTLIE DYSFUNCTION 10 tablet 5    spironolactone (ALDACTONE) 25 mg tablet Take 1 tablet (25 mg total) by mouth daily 30 tablet 0    amLODIPine (NORVASC) 5 mg tablet Take 1 tablet (5 mg total) by mouth daily 30 tablet 0    Insulin Pen Needle (B-D ULTRAFINE III SHORT PEN) 31G X 8 MM MISC by Does not apply route      ONETOUCH DELICA LANCETS FINE MISC by Does not apply route      traMADol (ULTRAM) 50 mg tablet Take 1 tablet (50 mg total) by mouth every 6 (six) hours as needed for moderate pain (Patient not taking: Reported on 7/1/2020) 30 tablet 0     No current facility-administered medications for this visit  Objective:    /84   Pulse 80   Temp (!) 94 8 °F (34 9 °C)   Resp 14   Ht 5' 11" (1 803 m)   Wt (!) 144 kg (317 lb)   SpO2 95%   BMI 44 21 kg/m²        Physical Exam  Vitals signs and nursing note reviewed  Constitutional:       General: He is not in acute distress  Appearance: He is well-developed  He is not diaphoretic  HENT:      Head: Normocephalic and atraumatic  Right Ear: External ear normal       Left Ear: External ear normal       Nose: Nose normal       Mouth/Throat:      Pharynx: No oropharyngeal exudate  Eyes:      General: No scleral icterus  Right eye: No discharge  Left eye: No discharge  Pupils: Pupils are equal, round, and reactive to light  Neck:      Thyroid: No thyromegaly  Cardiovascular:      Rate and Rhythm: Normal rate  Pulses: no weak pulses          Dorsalis pedis pulses are 2+ on the right side and 2+ on the left side  Posterior tibial pulses are 2+ on the right side and 2+ on the left side  Heart sounds: Normal heart sounds  No murmur  Pulmonary:      Effort: Pulmonary effort is normal  No respiratory distress  Breath sounds: Normal breath sounds  No wheezing  Abdominal:      General: Bowel sounds are normal  There is no distension  Palpations: Abdomen is soft  There is no mass  Tenderness: There is no abdominal tenderness  There is no guarding or rebound  Musculoskeletal: Normal range of motion  Feet:      Right foot:      Skin integrity: No ulcer, skin breakdown, erythema, warmth, callus or dry skin        Left foot:      Skin integrity: No ulcer, skin breakdown, erythema, warmth, callus or dry skin  Skin:     General: Skin is warm and dry  Findings: No erythema or rash  Neurological:      Mental Status: He is alert  Coordination: Coordination normal       Deep Tendon Reflexes: Reflexes normal    Psychiatric:         Behavior: Behavior normal             Diabetic Foot Exam    Patient's shoes and socks removed  Right Foot/Ankle   Right Foot Inspection  Skin Exam: skin normal skin not intact, no dry skin, no warmth, no callus, no erythema, no maceration, no abnormal color, no pre-ulcer, no ulcer and no callus                          Toe Exam: ROM and strength within normal limits  Sensory   Vibration: intact  Proprioception: intact   Monofilament testing: intact  Vascular  Capillary refills: < 3 seconds  The right DP pulse is 2+  The right PT pulse is 2+  Left Foot/Ankle  Left Foot Inspection  Skin Exam: skin normalskin not intact, no dry skin, no warmth, no erythema, no maceration, normal color, no pre-ulcer, no ulcer and no callus                         Toe Exam: ROM and strength within normal limits                   Sensory   Vibration: intact  Proprioception: intact  Monofilament: intact  Vascular  Capillary refills: < 3 seconds  The left DP pulse is 2+  The left PT pulse is 2+  Assign Risk Category:  No deformity present; No loss of protective sensation; No weak pulses       Risk: 0      Recent Results (from the past 672 hour(s))   Lipid Panel with Direct LDL reflex    Collection Time: 04/12/21  9:55 AM   Result Value Ref Range    Total Cholesterol 161 <200 mg/dL    HDL 36 (L) > OR = 40 mg/dL    Triglycerides 210 (H) <150 mg/dL    LDL Calculated 95 mg/dL (calc)    Chol HDLC Ratio 4 5 <5 0 (calc)    Non-HDL Cholesterol 125 <130 mg/dL (calc)   Microalbumin, Random Urine (W/Creatinine)    Collection Time: 04/12/21  9:55 AM   Result Value Ref Range    Creatinine, Urine 293 20 - 320 mg/dL    Microalbum  ,U,Random 61 7 See Note: mg/dL    Microalb/Creat Ratio 211 (H) <30 mcg/mg creat   Comprehensive metabolic panel    Collection Time: 04/12/21  9:55 AM   Result Value Ref Range    Glucose, Random 154 (H) 65 - 99 mg/dL    BUN 11 7 - 25 mg/dL    Creatinine 0 69 (L) 0 70 - 1 33 mg/dL    eGFR Non  110 > OR = 60 mL/min/1 73m2    eGFR  127 > OR = 60 mL/min/1 73m2    SL AMB BUN/CREATININE RATIO 16 6 - 22 (calc)    Sodium 141 135 - 146 mmol/L    Potassium 4 6 3 5 - 5 3 mmol/L    Chloride 103 98 - 110 mmol/L    CO2 30 20 - 32 mmol/L    Calcium 9 0 8 6 - 10 3 mg/dL    Protein, Total 6 9 6 1 - 8 1 g/dL    Albumin 4 0 3 6 - 5 1 g/dL    Globulin 2 9 1 9 - 3 7 g/dL (calc)    Albumin/Globulin Ratio 1 4 1 0 - 2 5 (calc)    TOTAL BILIRUBIN 0 5 0 2 - 1 2 mg/dL    Alkaline Phosphatase 91 35 - 144 U/L    AST 17 10 - 35 U/L    ALT 26 9 - 46 U/L   Hemoglobin A1c (w/out EAG)    Collection Time: 04/12/21  9:55 AM   Result Value Ref Range    Hemoglobin A1C 7 3 (H) <5 7 % of total Hgb         Rianna Duvall DO  BMI Counseling: Body mass index is 44 21 kg/m²  The BMI is above normal  Nutrition recommendations include reducing portion sizes

## 2021-07-30 DIAGNOSIS — E11.42 DIABETIC POLYNEUROPATHY ASSOCIATED WITH TYPE 2 DIABETES MELLITUS (HCC): ICD-10-CM

## 2021-08-18 DIAGNOSIS — E11.42 DIABETIC POLYNEUROPATHY ASSOCIATED WITH TYPE 2 DIABETES MELLITUS (HCC): ICD-10-CM

## 2021-08-18 RX ORDER — DAPAGLIFLOZIN 10 MG/1
TABLET, FILM COATED ORAL
Qty: 90 TABLET | Refills: 0 | Status: SHIPPED | OUTPATIENT
Start: 2021-08-18 | End: 2021-12-17

## 2021-09-14 RX ORDER — ROSUVASTATIN CALCIUM 20 MG/1
TABLET, COATED ORAL
Qty: 30 TABLET | Refills: 11 | Status: SHIPPED | OUTPATIENT
Start: 2021-09-14 | End: 2022-01-07 | Stop reason: SDUPTHER

## 2021-10-01 LAB
ALBUMIN SERPL-MCNC: 4.1 G/DL (ref 3.6–5.1)
ALBUMIN/GLOB SERPL: 1.4 (CALC) (ref 1–2.5)
ALP SERPL-CCNC: 91 U/L (ref 35–144)
ALT SERPL-CCNC: 24 U/L (ref 9–46)
AST SERPL-CCNC: 15 U/L (ref 10–35)
BILIRUB SERPL-MCNC: 0.8 MG/DL (ref 0.2–1.2)
BUN SERPL-MCNC: 12 MG/DL (ref 7–25)
BUN/CREAT SERPL: ABNORMAL (CALC) (ref 6–22)
CALCIUM SERPL-MCNC: 9.4 MG/DL (ref 8.6–10.3)
CHLORIDE SERPL-SCNC: 100 MMOL/L (ref 98–110)
CO2 SERPL-SCNC: 28 MMOL/L (ref 20–32)
CREAT SERPL-MCNC: 0.76 MG/DL (ref 0.7–1.33)
GLOBULIN SER CALC-MCNC: 3 G/DL (CALC) (ref 1.9–3.7)
GLUCOSE SERPL-MCNC: 135 MG/DL (ref 65–99)
HBA1C MFR BLD: 7.4 % OF TOTAL HGB
POTASSIUM SERPL-SCNC: 4.5 MMOL/L (ref 3.5–5.3)
PROT SERPL-MCNC: 7.1 G/DL (ref 6.1–8.1)
PSA SERPL-MCNC: 0.5 NG/ML
SL AMB EGFR AFRICAN AMERICAN: 122 ML/MIN/1.73M2
SL AMB EGFR NON AFRICAN AMERICAN: 105 ML/MIN/1.73M2
SODIUM SERPL-SCNC: 139 MMOL/L (ref 135–146)

## 2021-10-01 PROCEDURE — 3051F HG A1C>EQUAL 7.0%<8.0%: CPT | Performed by: FAMILY MEDICINE

## 2021-10-06 ENCOUNTER — OFFICE VISIT (OUTPATIENT)
Dept: FAMILY MEDICINE CLINIC | Facility: CLINIC | Age: 51
End: 2021-10-06
Payer: COMMERCIAL

## 2021-10-06 VITALS
RESPIRATION RATE: 16 BRPM | HEIGHT: 71 IN | BODY MASS INDEX: 44.1 KG/M2 | DIASTOLIC BLOOD PRESSURE: 84 MMHG | HEART RATE: 76 BPM | WEIGHT: 315 LBS | SYSTOLIC BLOOD PRESSURE: 136 MMHG | TEMPERATURE: 99.6 F

## 2021-10-06 DIAGNOSIS — B35.6 JOCK ITCH: ICD-10-CM

## 2021-10-06 DIAGNOSIS — E78.2 MIXED HYPERLIPIDEMIA: ICD-10-CM

## 2021-10-06 DIAGNOSIS — E11.42 DIABETIC POLYNEUROPATHY ASSOCIATED WITH TYPE 2 DIABETES MELLITUS (HCC): ICD-10-CM

## 2021-10-06 DIAGNOSIS — Z00.00 WELL ADULT EXAM: Primary | ICD-10-CM

## 2021-10-06 DIAGNOSIS — Z12.11 SCREEN FOR COLON CANCER: ICD-10-CM

## 2021-10-06 DIAGNOSIS — Z11.59 NEED FOR HEPATITIS C SCREENING TEST: ICD-10-CM

## 2021-10-06 DIAGNOSIS — Z23 NEED FOR INFLUENZA VACCINATION: ICD-10-CM

## 2021-10-06 DIAGNOSIS — Z23 NEED FOR VACCINATION: ICD-10-CM

## 2021-10-06 PROCEDURE — 3725F SCREEN DEPRESSION PERFORMED: CPT | Performed by: FAMILY MEDICINE

## 2021-10-06 PROCEDURE — 90682 RIV4 VACC RECOMBINANT DNA IM: CPT

## 2021-10-06 PROCEDURE — 3008F BODY MASS INDEX DOCD: CPT | Performed by: FAMILY MEDICINE

## 2021-10-06 PROCEDURE — 99396 PREV VISIT EST AGE 40-64: CPT | Performed by: FAMILY MEDICINE

## 2021-10-06 PROCEDURE — 90471 IMMUNIZATION ADMIN: CPT

## 2021-10-06 PROCEDURE — 1036F TOBACCO NON-USER: CPT | Performed by: FAMILY MEDICINE

## 2021-10-06 RX ORDER — NYSTATIN 100000 [USP'U]/G
1 POWDER TOPICAL 2 TIMES DAILY
Qty: 60 G | Refills: 1 | Status: SHIPPED | OUTPATIENT
Start: 2021-10-06 | End: 2022-05-13

## 2021-10-06 RX ORDER — INSULIN DEGLUDEC 200 U/ML
56 INJECTION, SOLUTION SUBCUTANEOUS
Qty: 8.4 ML | Refills: 5 | Status: SHIPPED | OUTPATIENT
Start: 2021-10-06 | End: 2022-04-06 | Stop reason: SDUPTHER

## 2021-10-09 ENCOUNTER — IMMUNIZATIONS (OUTPATIENT)
Dept: FAMILY MEDICINE CLINIC | Facility: HOSPITAL | Age: 51
End: 2021-10-09

## 2021-10-09 DIAGNOSIS — Z23 ENCOUNTER FOR IMMUNIZATION: Primary | ICD-10-CM

## 2021-10-09 PROCEDURE — 0001A SARS-COV-2 / COVID-19 MRNA VACCINE (PFIZER-BIONTECH) 30 MCG: CPT

## 2021-10-09 PROCEDURE — 91300 SARS-COV-2 / COVID-19 MRNA VACCINE (PFIZER-BIONTECH) 30 MCG: CPT

## 2021-10-12 DIAGNOSIS — I87.2 VENOUS INSUFFICIENCY: ICD-10-CM

## 2021-10-12 DIAGNOSIS — I50.20 SYSTOLIC HEART FAILURE (HCC): ICD-10-CM

## 2021-10-12 DIAGNOSIS — I11.0 MALIGNANT HYPERTENSION WITH SYSTOLIC CHF, NYHA CLASS 2 (HCC): ICD-10-CM

## 2021-10-12 DIAGNOSIS — I10 CHRONIC HYPERTENSION: ICD-10-CM

## 2021-10-12 DIAGNOSIS — I10 HTN (HYPERTENSION), MALIGNANT: ICD-10-CM

## 2021-10-12 DIAGNOSIS — I50.20 MALIGNANT HYPERTENSION WITH SYSTOLIC CHF, NYHA CLASS 2 (HCC): ICD-10-CM

## 2021-10-12 RX ORDER — AMLODIPINE BESYLATE 5 MG/1
5 TABLET ORAL DAILY
Qty: 90 TABLET | Refills: 0 | Status: SHIPPED | OUTPATIENT
Start: 2021-10-12 | End: 2022-01-06

## 2021-11-21 DIAGNOSIS — I10 HTN (HYPERTENSION), MALIGNANT: ICD-10-CM

## 2021-11-21 DIAGNOSIS — E11.42 DIABETIC POLYNEUROPATHY ASSOCIATED WITH TYPE 2 DIABETES MELLITUS (HCC): ICD-10-CM

## 2021-11-21 DIAGNOSIS — I10 CHRONIC HYPERTENSION: ICD-10-CM

## 2021-11-21 DIAGNOSIS — I50.20 SYSTOLIC HEART FAILURE (HCC): ICD-10-CM

## 2021-11-21 DIAGNOSIS — I87.2 VENOUS INSUFFICIENCY: ICD-10-CM

## 2021-11-21 DIAGNOSIS — I11.0 MALIGNANT HYPERTENSION WITH SYSTOLIC CHF, NYHA CLASS 2 (HCC): ICD-10-CM

## 2021-11-21 DIAGNOSIS — I50.20 MALIGNANT HYPERTENSION WITH SYSTOLIC CHF, NYHA CLASS 2 (HCC): ICD-10-CM

## 2021-11-30 DIAGNOSIS — I87.2 VENOUS INSUFFICIENCY: ICD-10-CM

## 2021-11-30 DIAGNOSIS — I11.0 MALIGNANT HYPERTENSION WITH SYSTOLIC CHF, NYHA CLASS 2 (HCC): ICD-10-CM

## 2021-11-30 DIAGNOSIS — I10 HTN (HYPERTENSION), MALIGNANT: ICD-10-CM

## 2021-11-30 DIAGNOSIS — E11.42 DIABETIC POLYNEUROPATHY ASSOCIATED WITH TYPE 2 DIABETES MELLITUS (HCC): ICD-10-CM

## 2021-11-30 DIAGNOSIS — I50.20 MALIGNANT HYPERTENSION WITH SYSTOLIC CHF, NYHA CLASS 2 (HCC): ICD-10-CM

## 2021-11-30 DIAGNOSIS — I10 CHRONIC HYPERTENSION: ICD-10-CM

## 2021-11-30 DIAGNOSIS — I50.20 SYSTOLIC HEART FAILURE (HCC): ICD-10-CM

## 2021-11-30 PROCEDURE — 4010F ACE/ARB THERAPY RXD/TAKEN: CPT | Performed by: FAMILY MEDICINE

## 2021-11-30 RX ORDER — ENALAPRIL MALEATE 20 MG/1
20 TABLET ORAL 2 TIMES DAILY
Refills: 0 | Status: CANCELLED | OUTPATIENT
Start: 2021-11-30

## 2021-11-30 RX ORDER — CARVEDILOL 25 MG/1
25 TABLET ORAL 2 TIMES DAILY WITH MEALS
Qty: 180 TABLET | Refills: 0 | Status: CANCELLED | OUTPATIENT
Start: 2021-11-30

## 2021-12-01 DIAGNOSIS — E11.42 DIABETIC POLYNEUROPATHY ASSOCIATED WITH TYPE 2 DIABETES MELLITUS (HCC): ICD-10-CM

## 2021-12-01 DIAGNOSIS — I11.0 MALIGNANT HYPERTENSION WITH SYSTOLIC CHF, NYHA CLASS 2 (HCC): ICD-10-CM

## 2021-12-01 DIAGNOSIS — I10 CHRONIC HYPERTENSION: ICD-10-CM

## 2021-12-01 DIAGNOSIS — I50.20 SYSTOLIC HEART FAILURE (HCC): ICD-10-CM

## 2021-12-01 DIAGNOSIS — I87.2 VENOUS INSUFFICIENCY: ICD-10-CM

## 2021-12-01 DIAGNOSIS — I50.20 MALIGNANT HYPERTENSION WITH SYSTOLIC CHF, NYHA CLASS 2 (HCC): ICD-10-CM

## 2021-12-01 DIAGNOSIS — I10 HTN (HYPERTENSION), MALIGNANT: ICD-10-CM

## 2021-12-06 RX ORDER — ENALAPRIL MALEATE 20 MG/1
TABLET ORAL
Qty: 180 TABLET | Refills: 3 | Status: SHIPPED | OUTPATIENT
Start: 2021-12-06 | End: 2022-01-07

## 2021-12-06 RX ORDER — CARVEDILOL 25 MG/1
TABLET ORAL
Qty: 180 TABLET | Refills: 3 | Status: SHIPPED | OUTPATIENT
Start: 2021-12-06 | End: 2022-01-07 | Stop reason: SDUPTHER

## 2021-12-06 RX ORDER — ENALAPRIL MALEATE 20 MG/1
20 TABLET ORAL 2 TIMES DAILY
Qty: 180 TABLET | Refills: 0 | Status: SHIPPED | OUTPATIENT
Start: 2021-12-06 | End: 2022-01-07

## 2021-12-06 RX ORDER — CARVEDILOL 25 MG/1
25 TABLET ORAL 2 TIMES DAILY WITH MEALS
Qty: 180 TABLET | Refills: 0 | Status: SHIPPED | OUTPATIENT
Start: 2021-12-06

## 2021-12-11 DIAGNOSIS — N52.01 ERECTILE DYSFUNCTION DUE TO ARTERIAL INSUFFICIENCY: ICD-10-CM

## 2021-12-13 RX ORDER — SILDENAFIL 100 MG/1
TABLET, FILM COATED ORAL
Qty: 10 TABLET | Refills: 5 | Status: SHIPPED | OUTPATIENT
Start: 2021-12-13 | End: 2022-06-02 | Stop reason: SDUPTHER

## 2021-12-16 DIAGNOSIS — E11.42 DIABETIC POLYNEUROPATHY ASSOCIATED WITH TYPE 2 DIABETES MELLITUS (HCC): ICD-10-CM

## 2021-12-17 RX ORDER — DAPAGLIFLOZIN 10 MG/1
TABLET, FILM COATED ORAL
Qty: 90 TABLET | Refills: 0 | Status: SHIPPED | OUTPATIENT
Start: 2021-12-17

## 2022-01-03 DIAGNOSIS — I87.2 VENOUS INSUFFICIENCY: ICD-10-CM

## 2022-01-03 DIAGNOSIS — I10 HTN (HYPERTENSION), MALIGNANT: ICD-10-CM

## 2022-01-03 DIAGNOSIS — I10 CHRONIC HYPERTENSION: ICD-10-CM

## 2022-01-03 DIAGNOSIS — I50.20 SYSTOLIC HEART FAILURE (HCC): ICD-10-CM

## 2022-01-03 DIAGNOSIS — I11.0 MALIGNANT HYPERTENSION WITH SYSTOLIC CHF, NYHA CLASS 2 (HCC): ICD-10-CM

## 2022-01-03 DIAGNOSIS — I50.20 MALIGNANT HYPERTENSION WITH SYSTOLIC CHF, NYHA CLASS 2 (HCC): ICD-10-CM

## 2022-01-06 RX ORDER — SPIRONOLACTONE 25 MG/1
TABLET ORAL
Qty: 90 TABLET | Refills: 3 | Status: SHIPPED | OUTPATIENT
Start: 2022-01-06 | End: 2022-01-07 | Stop reason: SDUPTHER

## 2022-01-06 RX ORDER — AMLODIPINE BESYLATE 5 MG/1
TABLET ORAL
Qty: 90 TABLET | Refills: 3 | Status: SHIPPED | OUTPATIENT
Start: 2022-01-06 | End: 2022-01-07 | Stop reason: SDUPTHER

## 2022-01-07 ENCOUNTER — OFFICE VISIT (OUTPATIENT)
Dept: CARDIOLOGY CLINIC | Facility: CLINIC | Age: 52
End: 2022-01-07
Payer: COMMERCIAL

## 2022-01-07 VITALS
SYSTOLIC BLOOD PRESSURE: 148 MMHG | OXYGEN SATURATION: 96 % | WEIGHT: 307 LBS | TEMPERATURE: 97.8 F | HEART RATE: 70 BPM | DIASTOLIC BLOOD PRESSURE: 100 MMHG | BODY MASS INDEX: 42.98 KG/M2 | HEIGHT: 71 IN

## 2022-01-07 DIAGNOSIS — I87.2 VENOUS INSUFFICIENCY: ICD-10-CM

## 2022-01-07 DIAGNOSIS — I50.20 MALIGNANT HYPERTENSION WITH SYSTOLIC CHF, NYHA CLASS 2 (HCC): Primary | ICD-10-CM

## 2022-01-07 DIAGNOSIS — I10 CHRONIC HYPERTENSION: ICD-10-CM

## 2022-01-07 DIAGNOSIS — I50.20 SYSTOLIC HEART FAILURE (HCC): ICD-10-CM

## 2022-01-07 DIAGNOSIS — I11.0 MALIGNANT HYPERTENSION WITH SYSTOLIC CHF, NYHA CLASS 2 (HCC): Primary | ICD-10-CM

## 2022-01-07 DIAGNOSIS — I10 HTN (HYPERTENSION), MALIGNANT: ICD-10-CM

## 2022-01-07 DIAGNOSIS — E11.42 DIABETIC POLYNEUROPATHY ASSOCIATED WITH TYPE 2 DIABETES MELLITUS (HCC): ICD-10-CM

## 2022-01-07 DIAGNOSIS — E78.5 DYSLIPIDEMIA: ICD-10-CM

## 2022-01-07 PROCEDURE — 93000 ELECTROCARDIOGRAM COMPLETE: CPT | Performed by: INTERNAL MEDICINE

## 2022-01-07 PROCEDURE — 99214 OFFICE O/P EST MOD 30 MIN: CPT | Performed by: INTERNAL MEDICINE

## 2022-01-07 PROCEDURE — 3008F BODY MASS INDEX DOCD: CPT | Performed by: INTERNAL MEDICINE

## 2022-01-07 PROCEDURE — 4010F ACE/ARB THERAPY RXD/TAKEN: CPT | Performed by: INTERNAL MEDICINE

## 2022-01-07 PROCEDURE — 1036F TOBACCO NON-USER: CPT | Performed by: INTERNAL MEDICINE

## 2022-01-07 RX ORDER — LISINOPRIL 20 MG/1
20 TABLET ORAL EVERY MORNING
Qty: 90 TABLET | Refills: 3 | Status: SHIPPED | OUTPATIENT
Start: 2022-01-07

## 2022-01-07 RX ORDER — SPIRONOLACTONE 25 MG/1
25 TABLET ORAL DAILY
Qty: 90 TABLET | Refills: 3 | Status: SHIPPED | OUTPATIENT
Start: 2022-01-07

## 2022-01-07 RX ORDER — ROSUVASTATIN CALCIUM 20 MG/1
20 TABLET, COATED ORAL DAILY
Qty: 90 TABLET | Refills: 3 | Status: SHIPPED | OUTPATIENT
Start: 2022-01-07

## 2022-01-07 RX ORDER — CARVEDILOL 25 MG/1
25 TABLET ORAL 2 TIMES DAILY WITH MEALS
Qty: 180 TABLET | Refills: 3 | Status: SHIPPED | OUTPATIENT
Start: 2022-01-07 | End: 2022-04-06

## 2022-01-07 RX ORDER — AMLODIPINE BESYLATE 5 MG/1
5 TABLET ORAL DAILY
Qty: 90 TABLET | Refills: 3 | Status: SHIPPED | OUTPATIENT
Start: 2022-01-07

## 2022-01-07 NOTE — PROGRESS NOTES
Cardiology Follow Up  Eneida Juan  1970  [id number]  [address] [facility] [address] [address] [phone]    1  Malignant hypertension with systolic CHF, NYHA class 2 (HCC)  POCT ECG    amLODIPine (NORVASC) 5 mg tablet    lisinopril (ZESTRIL) 20 mg tablet    Lipid Panel with Direct LDL reflex   2  Chronic hypertension  POCT ECG    amLODIPine (NORVASC) 5 mg tablet   3  HTN (hypertension), malignant  POCT ECG    amLODIPine (NORVASC) 5 mg tablet    carvedilol (COREG) 25 mg tablet    Lipid Panel with Direct LDL reflex   4  Venous insufficiency  POCT ECG    amLODIPine (NORVASC) 5 mg tablet   5  Diabetic polyneuropathy associated with type 2 diabetes mellitus (HCC)  POCT ECG   6  Dyslipidemia  POCT ECG    lisinopril (ZESTRIL) 20 mg tablet    rosuvastatin (CRESTOR) 20 MG tablet    Lipid Panel with Direct LDL reflex   7  Systolic heart failure (HCC)  amLODIPine (NORVASC) 5 mg tablet    lisinopril (ZESTRIL) 20 mg tablet    spironolactone (ALDACTONE) 25 mg tablet    Lipid Panel with Direct LDL reflex      Discussion/Plan:  Recent COVID infection- he is currently recovering    Hld- triglycerides are imroved  Hx of diabetes mellitus  -- Crestor 20 mg + coq10  We discussed compliance     Nonischemic HF- NYHA class 2 on exam  Euvolemia  EF improved to 50-55%  bp optimized  -- salt restriction  -- carvedilol 25mg bid  -- enalapril 20mg bid  -- aldosterone 25mg daily  -- at least twice a year electrolytes    Inferior q wave position  -- monitor    Morbid obesity- BMI 41  Weight is trending up  -- back with medical weight loss    Malignant Htn- controlled-- carvedillol 25mg-- enalapril + aldosterone-+ amlodipine 5mg - low salt dietA    trial flutter- typical  s/p ablation  sotalol d/c  Resolved    DM2- controlled    KYA- compliant with CPAP   Discussed wt loss    Rtc- one year      Interval History:  37 yo hx nonischemic heart failure with recovery of EF from 35-55%, obesity, dm2 on metformin, typical atrial flutter s/p successful ablation presents for follow-up  No symptoms of heart failure  No exercising much  Has had some weight gain  Diet is not optimized  Taking his medications including cholesterol  4/5:He has had no palpitations, shortness of breath, lower from edema  He is trying an exercise program  He is trying to improve his diet  We discussed in detail his advanced lipid panel  All his questions were answered  7/10: Denies having any return of palpitations  No dizziness or light-headness  HAs had trouble with his weights  Likes carbs  Triglycerides are not at goal  Compliant with meds  no chest pain  08/20/2018:  He denies having any recurrence of arrhythmias  He is compliant with his medications  He has not yet started structured exercise program   However he is starting with his wife  He denies having dizziness or lightheadedness  He denies having any lower extremity edema  10/01/2019:  He denies having chest discomfort  Denies having lower extremity edema  He denies feeling dizziness or lightheadedness  He is compliant with medications  He has had some forgetfulness with taking his Omega threes  We reviewed his last lipid panel  10/01/2020:  He had lost weight with a weight loss clinic  Unfortunately with COVID and due to significant family stressors he is put back weight  He is compliant with his CPAP at night  He denies having chest heaviness  He denies having palpitations  We reviewed through his last lipid panel  01/07/2022:  He has not been as compliant with his medications  He reports having a recent COVID infection  He still feels some fatigue  He still feels some shortness of breath  He denies having lower extremity swelling          Patient Active Problem List   Diagnosis    Diabetic polyneuropathy associated with type 2 diabetes mellitus ([facility] [address] )    Status post catheter ablation of atrial flutter    Mixed hyperlipidemia    Malignant hypertension with systolic CHF, NYHA class 2 (HCC)    Venous insufficiency (chronic) (peripheral)    Obstructive sleep apnea    Erectile dysfunction due to arterial insufficiency     Past Medical History:   Diagnosis Date    Acute bacterial prostatitis 2005    Last Assessed: 2005     Atrial flutter ([facility] )     Last Assessed: 2017     Cellulitis of toe of right foot     Last Assessed: 3/10/2017     Dermatophytosis of groin     Last Assessed: 3/21/2016     Hypertension     Hypokalemia     Last Assessed: 2015     Obesity     Last Assessed: 2015     Onychomycosis     Last Assessed: 2/15/2017     Sleep apnea     Tinea pedis of both feet     Last Assessed: 2016     Type 2 diabetes mellitus ([place] [address] )      Social History     Socioeconomic History    Marital status: /Civil Union     Spouse name: Not on file    Number of children: Not on file    Years of education: Not on file    Highest education level: Not on file   Occupational History    Not on file   Tobacco Use    Smoking status: Former Smoker     Packs/day: 1 00     Years: 11 00     Pack years: 11 00     Quit date:      Years since quittin 0    Smokeless tobacco: Never Used   Vaping Use    Vaping Use: Never used   Substance and Sexual Activity    Alcohol use: Yes     Comment: 1 drink/month- occasional alcohol use     Drug use: No    Sexual activity: Not on file   Other Topics Concern    Not on file   Social History Narrative    Sleeps 6-7 hours a day     Exercise: Walking      Social Determinants of Health     Financial Resource Strain: Not on file   Food Insecurity: Not on file   Transportation Needs: Not on file   Physical Activity: Not on file   Stress: Not on file   Social Connections: Not on file   Intimate Partner Violence: Not on file   Housing Stability: Not on file      Family History   Problem Relation Age of Onset    Arthritis Mother     Arrhythmia Mother     Hypertension Mother     Anxiety disorder Father     Arthritis Father     Atrial fibrillation Father     Hypertension Father     Other Father         Impaired cognition     Mental illness Neg Hx     Diabetes Neg Hx     Cancer Neg Hx     Stroke Neg Hx      Past Surgical History:   Procedure Laterality Date    CARDIAC SURGERY      flutter repair    VASECTOMY  2005    Last Assessed: 5/28/2015        Current Outpatient Medications:     amLODIPine (NORVASC) 5 mg tablet, Take 1 tablet (5 mg total) by mouth daily, Disp: 30 tablet, Rfl: 0    amLODIPine (NORVASC) 5 mg tablet, Take 1 tablet (5 mg total) by mouth daily, Disp: 90 tablet, Rfl: 3    carvedilol (COREG) 25 mg tablet, Take 1 tablet (25 mg total) by mouth 2 (two) times a day with meals, Disp: 180 tablet, Rfl: 0    carvedilol (COREG) 25 mg tablet, Take 1 tablet (25 mg total) by mouth 2 (two) times a day with meals, Disp: 180 tablet, Rfl: 3    Cholecalciferol (VITAMIN D) 2000 units tablet, Take 1 tablet by mouth daily, Disp: , Rfl:     Coenzyme Q10 (COQ-10) 200 MG CAPS, Take by mouth, Disp: , Rfl:     Farxiga 10 MG TABS, Take 1 tablet by mouth once daily, Disp: 90 tablet, Rfl: 0    glucose blood test strip, by In Vitro route, Disp: , Rfl:     insulin degludec (Tresiba FlexTouch) 200 units/mL CONCENTRATED U-200 injection pen, Inject 56 Units under the skin daily at bedtime, Disp: 8 4 mL, Rfl: 5    Insulin Pen Needle (B-D ULTRAFINE III SHORT PEN) 31G X 8 MM MISC, Inject under the skin 2 (two) times a day, Disp: 100 each, Rfl: 5    metFORMIN (GLUCOPHAGE) 1000 MG tablet, TAKE 1 TABLET BY MOUTH (1,000 MG TOTAL) TWICE DAILY WITH MEALS, Disp: 180 tablet, Rfl: 3    nystatin (nystatin) powder, Apply 1 application topically 2 (two) times a day To affected area, Disp: 60 g, Rfl: 1    Omega-3 Fatty Acids (fish oil) 1,000 mg, Take 2 capsules (2,000 mg total) by mouth 2 (two) times a day, Disp: 60 capsule, Rfl: 3    ONETOUCH DELICA LANCETS FINE MISC, by Does not apply route, Disp: , Rfl:     rosuvastatin (CRESTOR) 20 MG tablet, Take 1 tablet (20 mg total) by mouth daily, Disp: 90 tablet, Rfl: 3    sildenafil (VIAGRA) 100 mg tablet, TAKE 1 TABLET BY MOUTH DAILY AS NEEDED FOR ERECTLIE DYSFUNCTION, Disp: 10 tablet, Rfl: 5    spironolactone (ALDACTONE) 25 mg tablet, Take 1 tablet (25 mg total) by mouth daily, Disp: 90 tablet, Rfl: 3    lisinopril (ZESTRIL) 20 mg tablet, Take 1 tablet (20 mg total) by mouth every morning, Disp: 90 tablet, Rfl: 3    traMADol (ULTRAM) 50 mg tablet, Take 1 tablet (50 mg total) by mouth every 6 (six) hours as needed for moderate pain (Patient not taking: Reported on 1/7/2022 ), Disp: 30 tablet, Rfl: 0  No Known Allergies    Review of Systems:  Review of Systems   Constitutional: Positive for fatigue  HENT: Negative  Eyes: Negative  Respiratory: Positive for shortness of breath  Cardiovascular: Negative  Gastrointestinal: Negative  Endocrine: Negative  Genitourinary: Negative  Musculoskeletal: Negative  Skin: Negative  Allergic/Immunologic: Negative  Neurological: Negative  Hematological: Negative  Psychiatric/Behavioral: Negative  Vitals:    01/07/22 1417   BP: 148/100   BP Location: Right arm   Patient Position: Sitting   Cuff Size: Large   Pulse: 70   Temp: 97 8 °F (36 6 °C)   SpO2: 96%   Weight: (!) 139 kg (307 lb)   Height: 5' 11" (1 803 m)     Physical Exam:  Physical Exam  Constitutional:       General: He is not in acute distress  Appearance: He is well-developed  He is ill-appearing  He is not diaphoretic  HENT:      Head: Normocephalic and atraumatic  Right Ear: External ear normal       Left Ear: External ear normal    Eyes:      General: No scleral icterus  Right eye: No discharge  Left eye: No discharge        Conjunctiva/sclera: Conjunctivae normal       Pupils: Pupils are equal, round, and reactive to light    Neck:      Thyroid: No thyromegaly  Vascular: No JVD  Trachea: No tracheal deviation  Cardiovascular:      Rate and Rhythm: Normal rate and regular rhythm  Heart sounds: Murmur heard  No friction rub  Gallop present  Pulmonary:      Effort: Pulmonary effort is normal  No respiratory distress  Breath sounds: Normal breath sounds  No stridor  No wheezing or rales  Chest:      Chest wall: No tenderness  Abdominal:      General: Bowel sounds are normal  There is distension  Palpations: Abdomen is soft  There is no mass  Tenderness: There is no abdominal tenderness  There is no guarding or rebound  Musculoskeletal:         General: No tenderness or deformity  Normal range of motion  Cervical back: Normal range of motion and neck supple  Skin:     General: Skin is warm and dry  Coloration: Skin is not pale  Findings: No erythema or rash  Neurological:      Mental Status: He is alert and oriented to person, place, and time  Cranial Nerves: No cranial nerve deficit  Motor: No abnormal muscle tone  Coordination: Coordination normal       Deep Tendon Reflexes: Reflexes are normal and symmetric  Psychiatric:         Behavior: Behavior normal          Thought Content:  Thought content normal          Judgment: Judgment normal          Labs:     Lab Results   Component Value Date    WBC 8 5 12/07/2018    HGB 16 7 12/07/2018    HCT 50 1 (H) 12/07/2018    MCV 88 5 12/07/2018     12/07/2018     Lab Results   Component Value Date     11/04/2017    K 4 5 10/01/2021     10/01/2021    CO2 28 10/01/2021    BUN 12 10/01/2021    CREATININE 0 76 10/01/2021    GLUCOSE 133 (H) 11/04/2017    GLUF 131 (H) 08/06/2018    CALCIUM 9 4 10/01/2021    AST 15 10/01/2021    ALT 24 10/01/2021    ALKPHOS 91 10/01/2021    PROT 7 1 11/04/2017    BILITOT 0 5 11/04/2017    EGFR 104 08/06/2018     Lab Results   Component Value Date    CHOL 115 07/06/2017 CHOL 156 10/22/2016    CHOL 121 03/16/2016     Lab Results   Component Value Date    HDL 36 (L) 04/12/2021    HDL 33 (L) 06/26/2020    HDL 32 (L) 09/03/2019     Lab Results   Component Value Date    LDLCALC 95 04/12/2021    LDLCALC 63 06/26/2020    LDLCALC 66 09/03/2019     Lab Results   Component Value Date    TRIG 210 (H) 04/12/2021    TRIG 126 06/26/2020    TRIG 184 (H) 09/03/2019     Lab Results   Component Value Date    HGBA1C 7 4 (H) 10/01/2021       Imaging & Testing   I have personally reviewed pertinent reports  EKG: Personally reviewed  Normal sinus rhythm  QRS 90  UT interval 150  QTC of 400  Normal sinus rhythm inferior Q-waves no acute ST T wave changes  Normal sinus rhythm inferior Q-waves no acute ST changes    Cardiac testing:   No results found for this or any previous visit  Antoinette Lyn  Please call with any questions or suggestions    A description of the counseling:   Goals and Barriers:  Patient's ability to self care:  Medication side effect reviewed with patient in detail and all their questions answered  "This note has been constructed using a voice recognition system  Therefore there may be syntax, spelling, and/or grammatical errors   Please call if you have any questions  "

## 2022-02-05 LAB
LEFT EYE DIABETIC RETINOPATHY: NORMAL
RIGHT EYE DIABETIC RETINOPATHY: NORMAL

## 2022-04-01 LAB
ALBUMIN SERPL-MCNC: 4 G/DL (ref 3.6–5.1)
ALBUMIN/CREAT UR: 44 MCG/MG CREAT
ALBUMIN/GLOB SERPL: 1.4 (CALC) (ref 1–2.5)
ALP SERPL-CCNC: 96 U/L (ref 35–144)
ALT SERPL-CCNC: 29 U/L (ref 9–46)
AST SERPL-CCNC: 22 U/L (ref 10–35)
BILIRUB SERPL-MCNC: 0.7 MG/DL (ref 0.2–1.2)
BUN SERPL-MCNC: 10 MG/DL (ref 7–25)
BUN/CREAT SERPL: 16 (CALC) (ref 6–22)
CALCIUM SERPL-MCNC: 9.2 MG/DL (ref 8.6–10.3)
CHLORIDE SERPL-SCNC: 103 MMOL/L (ref 98–110)
CO2 SERPL-SCNC: 31 MMOL/L (ref 20–32)
CREAT SERPL-MCNC: 0.63 MG/DL (ref 0.7–1.33)
CREAT UR-MCNC: 162 MG/DL (ref 20–320)
GLOBULIN SER CALC-MCNC: 2.8 G/DL (CALC) (ref 1.9–3.7)
GLUCOSE SERPL-MCNC: 112 MG/DL (ref 65–99)
HBA1C MFR BLD: 7.2 % OF TOTAL HGB
HCV AB S/CO SERPL IA: 0.24
HCV AB SERPL QL IA: NORMAL
MICROALBUMIN UR-MCNC: 7.2 MG/DL
POTASSIUM SERPL-SCNC: 4.1 MMOL/L (ref 3.5–5.3)
PROT SERPL-MCNC: 6.8 G/DL (ref 6.1–8.1)
SL AMB EGFR AFRICAN AMERICAN: 131 ML/MIN/1.73M2
SL AMB EGFR NON AFRICAN AMERICAN: 113 ML/MIN/1.73M2
SODIUM SERPL-SCNC: 140 MMOL/L (ref 135–146)

## 2022-04-01 PROCEDURE — 3061F NEG MICROALBUMINURIA REV: CPT | Performed by: PHYSICIAN ASSISTANT

## 2022-04-01 PROCEDURE — 3051F HG A1C>EQUAL 7.0%<8.0%: CPT | Performed by: PHYSICIAN ASSISTANT

## 2022-04-06 ENCOUNTER — OFFICE VISIT (OUTPATIENT)
Dept: FAMILY MEDICINE CLINIC | Facility: CLINIC | Age: 52
End: 2022-04-06
Payer: COMMERCIAL

## 2022-04-06 VITALS
RESPIRATION RATE: 16 BRPM | TEMPERATURE: 97.9 F | HEIGHT: 71 IN | BODY MASS INDEX: 43.54 KG/M2 | HEART RATE: 68 BPM | OXYGEN SATURATION: 97 % | SYSTOLIC BLOOD PRESSURE: 142 MMHG | WEIGHT: 311 LBS | DIASTOLIC BLOOD PRESSURE: 82 MMHG

## 2022-04-06 DIAGNOSIS — E78.2 MIXED HYPERLIPIDEMIA: ICD-10-CM

## 2022-04-06 DIAGNOSIS — I50.20 SYSTOLIC HEART FAILURE (HCC): ICD-10-CM

## 2022-04-06 DIAGNOSIS — E11.42 DIABETIC POLYNEUROPATHY ASSOCIATED WITH TYPE 2 DIABETES MELLITUS (HCC): Primary | ICD-10-CM

## 2022-04-06 DIAGNOSIS — E78.5 DYSLIPIDEMIA: ICD-10-CM

## 2022-04-06 DIAGNOSIS — E66.01 MORBID OBESITY WITH BMI OF 40.0-44.9, ADULT (HCC): ICD-10-CM

## 2022-04-06 DIAGNOSIS — I11.0 MALIGNANT HYPERTENSION WITH SYSTOLIC CHF, NYHA CLASS 2 (HCC): ICD-10-CM

## 2022-04-06 DIAGNOSIS — I50.20 MALIGNANT HYPERTENSION WITH SYSTOLIC CHF, NYHA CLASS 2 (HCC): ICD-10-CM

## 2022-04-06 PROCEDURE — 3725F SCREEN DEPRESSION PERFORMED: CPT | Performed by: FAMILY MEDICINE

## 2022-04-06 PROCEDURE — 99214 OFFICE O/P EST MOD 30 MIN: CPT | Performed by: FAMILY MEDICINE

## 2022-04-06 RX ORDER — INSULIN DEGLUDEC 200 U/ML
58 INJECTION, SOLUTION SUBCUTANEOUS
Qty: 8.7 ML | Refills: 5 | Status: SHIPPED | OUTPATIENT
Start: 2022-04-06 | End: 2022-10-03

## 2022-04-06 NOTE — PROGRESS NOTES
Assessment/Plan:    1  Diabetic polyneuropathy associated with type 2 diabetes mellitus (HCC)  -     insulin degludec Efe Bharattravis FlexTouch) 200 units/mL CONCENTRATED U-200 injection pen; Inject 58 Units under the skin daily at bedtime  -     Hemoglobin A1C; Future; Expected date: 07/06/2022    2  Mixed hyperlipidemia  -     Lipid Panel with Direct LDL reflex; Future; Expected date: 07/06/2022    3  Malignant hypertension with systolic CHF, NYHA class 2 ([facility] )  Assessment & Plan:  Wt Readings from Last 3 Encounters:   04/06/22 (!) 141 kg (311 lb)   01/07/22 (!) 139 kg (307 lb)   10/06/21 (!) 144 kg (318 lb)     I was going to increase the the lisinopril but the pt mentioned he has not started the lisinopril yet and is still on the enalapril / So pt will restart that and we will see what the pressure is like        Orders:  -     Comprehensive metabolic panel; Future; Expected date: 07/06/2022    4  Morbid obesity with BMI of 40 0-44 9, adult ([facility] )    5  Dyslipidemia    6  Systolic heart failure (HCC)    BMI Counseling: Body mass index is 43 38 kg/m²  The BMI is above normal  Nutrition recommendations include decreasing portion sizes  Exercise recommendations include moderate physical activity 150 minutes/week  No pharmacotherapy was ordered  Rationale for BMI follow-up plan is due to patient being overweight or obese  Depression Screening and Follow-up Plan: Patient was screened for depression during today's encounter  They screened negative with a PHQ-2 score of 0  Patient Instructions       Obesity   AMBULATORY CARE:   Obesity  means your body mass index (BMI) is greater than 30  Your healthcare provider will use your height and weight to measure your BMI  The risks of obesity include  many health problems, including injuries or physical disability    · Diabetes (high blood sugar level)    · High blood pressure or high cholesterol    · Heart disease    · Stroke    · Gallbladder or liver disease    · Cancer of the colon, breast, prostate, liver, or kidney    · Sleep apnea    · Arthritis or gout    Screening  is done to check for health conditions before you have signs or symptoms  If you are 28to 79years old, your blood sugar level may be checked every 3 years for signs of prediabetes or diabetes  Your healthcare provider will check your blood pressure at each visit  High blood pressure can lead to a stroke or other problems  Your provider may check for signs of heart disease, cancer, or other health problems  Seek care immediately if:   · You have a severe headache, confusion, or difficulty speaking  · You have weakness on one side of your body  · You have chest pain, sweating, or shortness of breath  Call your doctor if:   · You have symptoms of gallbladder or liver disease, such as pain in your upper abdomen  · You have knee or hip pain and discomfort while walking  · You have symptoms of diabetes, such as intense hunger and thirst, and frequent urination  · You have symptoms of sleep apnea, such as snoring or daytime sleepiness  · You have questions or concerns about your condition or care  Treatment for obesity  focuses on helping you lose weight to improve your health  Even a small decrease in BMI can reduce the risk for many health problems  Your healthcare provider will help you set a weight-loss goal   · Lifestyle changes  are the first step in treating obesity  These include making healthy food choices and getting regular physical activity  Your healthcare provider may suggest a weight-loss program that involves coaching, education, and therapy  · Medicine  may help you lose weight when it is used with a healthy foods and physical activity  · Surgery  can help you lose weight if you are very obese and have other health problems  There are several types of weight-loss surgery  Ask your healthcare provider for more information      Tips for safe weight loss:   · Set small, realistic goals  An example of a small goal is to walk for 20 minutes 5 days a week  Anther goal is to lose 5% of your body weight  · Tell friends, family members, and coworkers about your goals  and ask for their support  Ask a friend to lose weight with you, or join a weight-loss support group  · Identify foods or triggers that may cause you to overeat , and find ways to avoid them  Remove tempting high-calorie foods from your home and workplace  Place a bowl of fresh fruit on your kitchen counter  If stress causes you to eat, then find other ways to cope with stress  A counselor or therapist may be able to help you  · Keep a diary to track what you eat and drink  Also write down how many minutes of physical activity you do each day  Weigh yourself once a week and record it in your diary  Eating changes: You will need to eat 500 to 1,000 fewer calories each day than you currently eat to lose 1 to 2 pounds a week  The following changes will help you cut calories:  · Eat smaller portions  Use small plates, no larger than 9 inches in diameter  Fill your plate half full of fruits and vegetables  Measure your food using measuring cups until you know what a serving size looks like  · Eat 3 meals and 1 or 2 snacks each day  Plan your meals in advance  [place] and eat at home most of the time  Eat slowly  Do not skip meals  Skipping meals can lead to overeating later in the day  This can make it harder for you to lose weight  Talk with a dietitian to help you make a meal plan and schedule that is right for you  · Eat fruits and vegetables at every meal   They are low in calories and high in fiber, which makes you feel full  Do not add butter, margarine, or cream sauce to vegetables  Use herbs to season steamed vegetables  · Eat less fat and fewer fried foods  Eat more baked or grilled chicken and fish  These protein sources are lower in calories and fat than red meat  Limit fast food   Dress your salads with olive oil and vinegar instead of bottled dressing  · Limit the amount of sugar you eat  Do not drink sugary beverages  Limit alcohol  Activity changes:  Physical activity is good for your body in many ways  It helps you burn calories and build strong muscles  It decreases stress and depression, and improves your mood  It can also help you sleep better  Talk to your healthcare provider before you begin an exercise program   · Exercise for at least 30 minutes 5 days a week  Start slowly  Set aside time each day for physical activity that you enjoy and that is convenient for you  It is best to do both weight training and an activity that increases your heart rate, such as walking, bicycling, or swimming  · Find ways to be more active  Do yard work and housecleaning  Walk up the stairs instead of using elevators  Spend your leisure time going to events that require walking, such as outdoor festivals or fairs  This extra physical activity can help you lose weight and keep it off  Follow up with your doctor as directed: You may need to meet with a dietitian  Write down your questions so you remember to ask them during your visits  © Copyright [address] 2022 Information is for End User's use only and may not be sold, redistributed or otherwise used for commercial purposes  All illustrations and images included in CareNotes® are the copyrighted property of A D A M , Inc  or [facility] Zay Jarvis   The above information is an  only  It is not intended as medical advice for individual conditions or treatments  Talk to your doctor, nurse or pharmacist before following any medical regimen to see if it is safe and effective for you  Return in 3 months (on 7/6/2022) for Diabetes Follow-up  Subjective:      Patient ID: Lora Rome is a 45 y o  male      Chief Complaint   Patient presents with    Follow-up     Margo Live       Pt is here for a three month follow up  Pt states he ended up with COVID in December    Pt was seen by cardio in Jan - he states he switched one of his meds      The following portions of the patient's history were reviewed and updated as appropriate: allergies, current medications, past family history, past medical history, past social history, past surgical history and problem list     Review of Systems   Constitutional: Negative for activity change, appetite change, chills, diaphoresis, fatigue, fever and unexpected weight change  HENT: Negative for congestion, dental problem, ear pain, mouth sores, sinus pressure, sinus pain, sore throat and trouble swallowing  Eyes: Negative for photophobia, discharge and itching  Respiratory: Negative for apnea, chest tightness and shortness of breath  Cardiovascular: Negative for chest pain, palpitations and leg swelling  Gastrointestinal: Negative for abdominal distention, abdominal pain, blood in stool, nausea and vomiting  Endocrine: Negative for cold intolerance, heat intolerance, polydipsia, polyphagia and polyuria  Genitourinary: Negative for difficulty urinating  Musculoskeletal: Negative for arthralgias  Skin: Negative for color change and wound  Neurological: Negative for dizziness, syncope, speech difficulty and headaches  Hematological: Negative for adenopathy  Psychiatric/Behavioral: Negative for agitation and behavioral problems           Current Outpatient Medications   Medication Sig Dispense Refill    amLODIPine (NORVASC) 5 mg tablet Take 1 tablet (5 mg total) by mouth daily 90 tablet 3    carvedilol (COREG) 25 mg tablet Take 1 tablet (25 mg total) by mouth 2 (two) times a day with meals 180 tablet 0    Cholecalciferol (VITAMIN D) 2000 units tablet Take 1 tablet by mouth daily      Coenzyme Q10 (COQ-10) 200 MG CAPS Take by mouth      Farxiga 10 MG TABS Take 1 tablet by mouth once daily 90 tablet 0    glucose blood test strip by In Vitro route      insulin degludec Rheta Luisana FlexTouch) 200 units/mL CONCENTRATED U-200 injection pen Inject 58 Units under the skin daily at bedtime 8 7 mL 5    Insulin Pen Needle (B-D ULTRAFINE III SHORT PEN) 31G X 8 MM MISC Inject under the skin 2 (two) times a day 100 each 5    lisinopril (ZESTRIL) 20 mg tablet Take 1 tablet (20 mg total) by mouth every morning 90 tablet 3    metFORMIN (GLUCOPHAGE) 1000 MG tablet TAKE 1 TABLET BY MOUTH (1,000 MG TOTAL) TWICE DAILY WITH MEALS 180 tablet 3    nystatin (nystatin) powder Apply 1 application topically 2 (two) times a day To affected area 60 g 1    Omega-3 Fatty Acids (fish oil) 1,000 mg Take 2 capsules (2,000 mg total) by mouth 2 (two) times a day 60 capsule 3    ONETOUCH DELICA LANCETS FINE MISC by Does not apply route      rosuvastatin (CRESTOR) 20 MG tablet Take 1 tablet (20 mg total) by mouth daily 90 tablet 3    sildenafil (VIAGRA) 100 mg tablet TAKE 1 TABLET BY MOUTH DAILY AS NEEDED FOR ERECTLIE DYSFUNCTION 10 tablet 5    spironolactone (ALDACTONE) 25 mg tablet Take 1 tablet (25 mg total) by mouth daily 90 tablet 3     No current facility-administered medications for this visit  Objective:    /82   Pulse 68   Temp 97 9 °F (36 6 °C)   Resp 16   Ht 5' 11" (1 803 m)   Wt (!) 141 kg (311 lb)   SpO2 97%   BMI 43 38 kg/m²        Physical Exam  Vitals and nursing note reviewed  Constitutional:       General: He is not in acute distress  Appearance: He is well-developed  He is not diaphoretic  HENT:      Head: Normocephalic and atraumatic  Right Ear: External ear normal       Left Ear: External ear normal       Nose: Nose normal       Mouth/Throat:      Pharynx: No oropharyngeal exudate  Eyes:      General: No scleral icterus  Right eye: No discharge  Left eye: No discharge  Pupils: Pupils are equal, round, and reactive to light  Neck:      Thyroid: No thyromegaly  Cardiovascular:      Rate and Rhythm: Normal rate  Pulses: no weak pulses          Dorsalis pedis pulses are 2+ on the right side and 2+ on the left side  Posterior tibial pulses are 2+ on the right side and 2+ on the left side  Heart sounds: Normal heart sounds  No murmur heard  Pulmonary:      Effort: Pulmonary effort is normal  No respiratory distress  Breath sounds: Normal breath sounds  No wheezing  Abdominal:      General: Bowel sounds are normal  There is no distension  Palpations: Abdomen is soft  There is no mass  Tenderness: There is no abdominal tenderness  There is no guarding or rebound  Musculoskeletal:         General: Normal range of motion  Feet:      Right foot:      Skin integrity: No ulcer, skin breakdown, erythema, warmth, callus or dry skin  Left foot:      Skin integrity: No ulcer, skin breakdown, erythema, warmth, callus or dry skin  Skin:     General: Skin is warm and dry  Findings: No erythema or rash  Neurological:      Mental Status: He is alert  Coordination: Coordination normal       Deep Tendon Reflexes: Reflexes normal    Psychiatric:         Behavior: Behavior normal               Recent Results (from the past 672 hour(s))   Microalbumin, Random Urine (W/Creatinine)    Collection Time: 04/01/22  8:27 AM   Result Value Ref Range    Creatinine, Urine 162 20 - 320 mg/dL    Microalbum  ,U,Random 7 2 See Note: mg/dL    Microalb/Creat Ratio 44 (H) <30 mcg/mg creat   Comprehensive metabolic panel    Collection Time: 04/01/22  8:27 AM   Result Value Ref Range    Glucose, Random 112 (H) 65 - 99 mg/dL    BUN 10 7 - 25 mg/dL    Creatinine 0 63 (L) 0 70 - 1 33 mg/dL    eGFR Non  113 > OR = 60 mL/min/1 73m2    eGFR  131 > OR = 60 mL/min/1 73m2    SL AMB BUN/CREATININE RATIO 16 6 - 22 (calc)    Sodium 140 135 - 146 mmol/L    Potassium 4 1 3 5 - 5 3 mmol/L    Chloride 103 98 - 110 mmol/L    CO2 31 20 - 32 mmol/L    Calcium 9 2 8 6 - 10 3 mg/dL    Protein, Total 6 8 6 1 - 8 1 g/dL    Albumin 4 0 3 6 - 5 1 g/dL    Globulin 2 8 1 9 - 3 7 g/dL (calc)    Albumin/Globulin Ratio 1 4 1 0 - 2 5 (calc)    TOTAL BILIRUBIN 0 7 0 2 - 1 2 mg/dL    Alkaline Phosphatase 96 35 - 144 U/L    AST 22 10 - 35 U/L    ALT 29 9 - 46 U/L   Hepatitis C Ab W/Refl To HCV RNA, Qn, PCR    Collection Time: 04/01/22  8:27 AM   Result Value Ref Range    HEP C AB NON-REACTIVE NON-REACTIVE    Signal to Cut-Off 0 24 <1 00   Hemoglobin A1c (w/out EAG)    Collection Time: 04/01/22  8:27 AM   Result Value Ref Range    Hemoglobin A1C 7 2 (H) <5 7 % of total Hgb       Diabetic Foot Exam    Patient's shoes and socks removed  Right Foot/Ankle   Right Foot Inspection  Skin Exam: skin normal  Skin not intact, no dry skin, no warmth, no callus, no erythema, no maceration, no abnormal color, no pre-ulcer, no ulcer and no callus  Toe Exam: ROM and strength within normal limits  Sensory   Vibration: intact  Proprioception: intact  Monofilament testing: intact    Vascular  Capillary refills: < 3 seconds  The right DP pulse is 2+  The right PT pulse is 2+  Left Foot/Ankle  Left Foot Inspection  Skin Exam: skin normal  Skin not intact, no dry skin, no warmth, no erythema, no maceration, normal color, no pre-ulcer, no ulcer and no callus  Toe Exam: ROM and strength within normal limits  Sensory   Vibration: intact  Proprioception: intact  Monofilament testing: intact    Vascular  Capillary refills: < 3 seconds  The left DP pulse is 2+  The left PT pulse is 2+  Assign Risk Category  No deformity present  No loss of protective sensation  No weak pulses  Risk: 0    Esteban Bearded, DO  BMI Counseling: Body mass index is 43 38 kg/m²  The BMI is above normal  Nutrition recommendations include reducing portion sizes

## 2022-04-06 NOTE — ASSESSMENT & PLAN NOTE
Wt Readings from Last 3 Encounters:   04/06/22 (!) 141 kg (311 lb)   01/07/22 (!) 139 kg (307 lb)   10/06/21 (!) 144 kg (318 lb)     I was going to increase the the lisinopril but the pt mentioned he has not started the lisinopril yet and is still on the enalapril / So pt will restart that and we will see what the pressure is like

## 2022-04-06 NOTE — PATIENT INSTRUCTIONS
Obesity   AMBULATORY CARE:   Obesity  means your body mass index (BMI) is greater than 30  Your healthcare provider will use your height and weight to measure your BMI  The risks of obesity include  many health problems, including injuries or physical disability  · Diabetes (high blood sugar level)    · High blood pressure or high cholesterol    · Heart disease    · Stroke    · Gallbladder or liver disease    · Cancer of the colon, breast, prostate, liver, or kidney    · Sleep apnea    · Arthritis or gout    Screening  is done to check for health conditions before you have signs or symptoms  If you are 28to 79years old, your blood sugar level may be checked every 3 years for signs of prediabetes or diabetes  Your healthcare provider will check your blood pressure at each visit  High blood pressure can lead to a stroke or other problems  Your provider may check for signs of heart disease, cancer, or other health problems  Seek care immediately if:   · You have a severe headache, confusion, or difficulty speaking  · You have weakness on one side of your body  · You have chest pain, sweating, or shortness of breath  Call your doctor if:   · You have symptoms of gallbladder or liver disease, such as pain in your upper abdomen  · You have knee or hip pain and discomfort while walking  · You have symptoms of diabetes, such as intense hunger and thirst, and frequent urination  · You have symptoms of sleep apnea, such as snoring or daytime sleepiness  · You have questions or concerns about your condition or care  Treatment for obesity  focuses on helping you lose weight to improve your health  Even a small decrease in BMI can reduce the risk for many health problems  Your healthcare provider will help you set a weight-loss goal   · Lifestyle changes  are the first step in treating obesity  These include making healthy food choices and getting regular physical activity   Your healthcare provider may suggest a weight-loss program that involves coaching, education, and therapy  · Medicine  may help you lose weight when it is used with a healthy foods and physical activity  · Surgery  can help you lose weight if you are very obese and have other health problems  There are several types of weight-loss surgery  Ask your healthcare provider for more information  Tips for safe weight loss:   · Set small, realistic goals  An example of a small goal is to walk for 20 minutes 5 days a week  Anther goal is to lose 5% of your body weight  · Tell friends, family members, and coworkers about your goals  and ask for their support  Ask a friend to lose weight with you, or join a weight-loss support group  · Identify foods or triggers that may cause you to overeat , and find ways to avoid them  Remove tempting high-calorie foods from your home and workplace  Place a bowl of fresh fruit on your kitchen counter  If stress causes you to eat, then find other ways to cope with stress  A counselor or therapist may be able to help you  · Keep a diary to track what you eat and drink  Also write down how many minutes of physical activity you do each day  Weigh yourself once a week and record it in your diary  Eating changes: You will need to eat 500 to 1,000 fewer calories each day than you currently eat to lose 1 to 2 pounds a week  The following changes will help you cut calories:  · Eat smaller portions  Use small plates, no larger than 9 inches in diameter  Fill your plate half full of fruits and vegetables  Measure your food using measuring cups until you know what a serving size looks like  · Eat 3 meals and 1 or 2 snacks each day  Plan your meals in advance  [place] and eat at home most of the time  Eat slowly  Do not skip meals  Skipping meals can lead to overeating later in the day  This can make it harder for you to lose weight   Talk with a dietitian to help you make a meal plan and schedule that is right for you  · Eat fruits and vegetables at every meal   They are low in calories and high in fiber, which makes you feel full  Do not add butter, margarine, or cream sauce to vegetables  Use herbs to season steamed vegetables  · Eat less fat and fewer fried foods  Eat more baked or grilled chicken and fish  These protein sources are lower in calories and fat than red meat  Limit fast food  Dress your salads with olive oil and vinegar instead of bottled dressing  · Limit the amount of sugar you eat  Do not drink sugary beverages  Limit alcohol  Activity changes:  Physical activity is good for your body in many ways  It helps you burn calories and build strong muscles  It decreases stress and depression, and improves your mood  It can also help you sleep better  Talk to your healthcare provider before you begin an exercise program   · Exercise for at least 30 minutes 5 days a week  Start slowly  Set aside time each day for physical activity that you enjoy and that is convenient for you  It is best to do both weight training and an activity that increases your heart rate, such as walking, bicycling, or swimming  · Find ways to be more active  Do yard work and housecleaning  Walk up the stairs instead of using elevators  Spend your leisure time going to events that require walking, such as outdoor festivals or fairs  This extra physical activity can help you lose weight and keep it off  Follow up with your doctor as directed: You may need to meet with a dietitian  Write down your questions so you remember to ask them during your visits  © Copyright [employer] 2022 Information is for End User's use only and may not be sold, redistributed or otherwise used for commercial purposes  All illustrations and images included in CareNotes® are the copyrighted property of A D A M , Inc  or Estevan Jarvis   The above information is an  only   It is not intended as medical advice for individual conditions or treatments  Talk to your doctor, nurse or pharmacist before following any medical regimen to see if it is safe and effective for you

## 2022-04-11 ENCOUNTER — CONSULT (OUTPATIENT)
Dept: GASTROENTEROLOGY | Facility: CLINIC | Age: 52
End: 2022-04-11
Payer: COMMERCIAL

## 2022-04-11 VITALS
WEIGHT: 315 LBS | BODY MASS INDEX: 44.1 KG/M2 | SYSTOLIC BLOOD PRESSURE: 152 MMHG | HEART RATE: 73 BPM | HEIGHT: 71 IN | TEMPERATURE: 98.4 F | DIASTOLIC BLOOD PRESSURE: 93 MMHG

## 2022-04-11 DIAGNOSIS — Z12.11 SCREEN FOR COLON CANCER: ICD-10-CM

## 2022-04-11 PROCEDURE — 99203 OFFICE O/P NEW LOW 30 MIN: CPT | Performed by: PHYSICIAN ASSISTANT

## 2022-04-11 PROCEDURE — 3008F BODY MASS INDEX DOCD: CPT | Performed by: PHYSICIAN ASSISTANT

## 2022-04-11 PROCEDURE — 1036F TOBACCO NON-USER: CPT | Performed by: PHYSICIAN ASSISTANT

## 2022-04-11 RX ORDER — SODIUM PICOSULFATE, MAGNESIUM OXIDE, AND ANHYDROUS CITRIC ACID 10; 3.5; 12 MG/160ML; G/160ML; G/160ML
1 LIQUID ORAL ONCE
Qty: 320 ML | Refills: 0 | Status: SHIPPED | OUTPATIENT
Start: 2022-04-11 | End: 2022-04-11

## 2022-04-11 NOTE — PATIENT INSTRUCTIONS
You can try starting a fiber supplement such Metamucil, Benefiber or Citrucel 1 tablespoon daily       Scheduled date of colonoscopy (as of today): 05/17/22  Physician performing colonoscopy: Coral Lawson  [facility] of colonoscopy: [facility]  Bowel prep reviewed with patient: CLENPIQ  Instructions reviewed with patient by: VIRGINIA  Clearances: VACCINATED

## 2022-04-11 NOTE — PROGRESS NOTES
[facility] - Outpatient Consultation  Javier Duncan 45 y o  male MRN: [id number]  Encounter: [id number]          ASSESSMENT AND PLAN:      1  Colon cancer screening  No GI symptoms  No prior colonoscopy  No family history of colon cancer  No recent hemoglobin  Patient appears at average risk     -schedule colonoscopy  -discussed risk of the procedure including bleeding, infection perforation   -clenpiq ordered  Can use MiraLax with magnesium citrate if not covered  Patient will follow-up after procedure as needed  ______________________________________________________________________    HPI:      Javier Duncan is a 54-year-old male with past medical history of type 2 diabetes, KYA, hyperlipidemia, a flutter s/p sblation who presents as a new patient for colon cancer screening  Patient reports he has been doing well and denies any constipation, diarrhea, blood in stool, decreased appetite or unintentional weight loss  He has been on metformin since 2015 reports this occasionally causes him to have loose stools around once a week  He has found that if he does not take this medication he does not have any loose stools  Reports this may be food associated as well  Recent lab work with normal GFR in LFTs  He denies any nausea, vomiting, reflux, dysphagia, odynophagia  No prior abdominal surgeries  No family history of colon cancer  Patient quit smoking in 2015 and previously quit for several years before that  No significant alcohol use  No prior EGD or colonoscopy      REVIEW OF SYSTEMS:    CONSTITUTIONAL: Denies any fever, chills, rigors, and weight loss  HEENT: No earache or tinnitus  Denies hearing loss or visual disturbances  CARDIOVASCULAR: No chest pain or palpitations  RESPIRATORY: Denies any cough, hemoptysis, shortness of breath or dyspnea on exertion  GASTROINTESTINAL: As noted in the History of Present Illness     GENITOURINARY: No problems with urination  Denies any hematuria or dysuria  NEUROLOGIC: No dizziness or vertigo, denies headaches  MUSCULOSKELETAL: +joint pains  SKIN: Denies skin rashes or itching  ENDOCRINE: Denies excessive thirst  Denies intolerance to heat or cold  PSYCHOSOCIAL: Denies depression or anxiety  Denies any recent memory loss         Historical Information   Past Medical History:   Diagnosis Date    Acute bacterial prostatitis 2005    Last Assessed: 2005     Atrial flutter (HCC)     Last Assessed: 2017     Cellulitis of toe of right foot     Last Assessed: 3/10/2017     Dermatophytosis of groin     Last Assessed: 3/21/2016     Hypertension     Hypokalemia     Last Assessed: 2015     Obesity     Last Assessed: 2015     Onychomycosis     Last Assessed: 2/15/2017     Sleep apnea     Tinea pedis of both feet     Last Assessed: 2016     Type 2 diabetes mellitus (HCC)      Past Surgical History:   Procedure Laterality Date    CARDIAC SURGERY      flutter repair    VASECTOMY      Last Assessed: 2015      Social History   Social History     Substance and Sexual Activity   Alcohol Use Yes    Comment: 1 drink/month- occasional alcohol use      Social History     Substance and Sexual Activity   Drug Use No     Social History     Tobacco Use   Smoking Status Former Smoker    Packs/day: 1 00    Years: 11 00    Pack years: 11 00    Quit date:     Years since quittin 2   Smokeless Tobacco Never Used     Family History   Problem Relation Age of Onset    Arthritis Mother     Arrhythmia Mother     Hypertension Mother     Anxiety disorder Father     Arthritis Father     Atrial fibrillation Father     Hypertension Father     Other Father         Impaired cognition     Mental illness Neg Hx     Diabetes Neg Hx     Cancer Neg Hx     Stroke Neg Hx        Meds/Allergies       Current Outpatient Medications:     amLODIPine (NORVASC) 5 mg tablet    carvedilol (COREG) 25 mg tablet    Cholecalciferol (VITAMIN D) 2000 units tablet    Coenzyme Q10 (COQ-10) 200 MG CAPS    insulin degludec Mavis Arrow FlexTouch) 200 units/mL CONCENTRATED U-200 injection pen    lisinopril (ZESTRIL) 20 mg tablet    metFORMIN (GLUCOPHAGE) 1000 MG tablet    nystatin (nystatin) powder    Omega-3 Fatty Acids (fish oil) 1,000 mg    rosuvastatin (CRESTOR) 20 MG tablet    sildenafil (VIAGRA) 100 mg tablet    spironolactone (ALDACTONE) 25 mg tablet    Farxiga 10 MG TABS    glucose blood test strip    Insulin Pen Needle (B-D ULTRAFINE III SHORT PEN) 31G X 8 MM MISC    ONETOUCH DELICA LANCETS FINE MISC    Sod Picosulfate-Mag Ox-Cit Acd (Clenpiq) 10-3 5-12 MG-GM -GM/160ML SOLN    No Known Allergies        Objective     Blood pressure 152/93, pulse 73, temperature 98 4 °F (36 9 °C), temperature source Temporal, height 5' 11" (1 803 m), weight (!) 143 kg (315 lb 3 2 oz)  Body mass index is 43 96 kg/m²  PHYSICAL EXAM:      General Appearance:   Alert, cooperative, no distress   HEENT:   Normocephalic, atraumatic, anicteric      Neck:  Supple, symmetrical, trachea midline   Lungs:   Clear to auscultation bilaterally; no rales, rhonchi or wheezing; respirations unlabored    Heart[de-identified]   Regular rate and rhythm; no murmur, rub, or gallop  Abdomen:   Soft, non-tender, non-distended; normal bowel sounds; no masses, no organomegaly    Genitalia:   Deferred    Rectal:   Deferred    Extremities:  No cyanosis, clubbing or edema    Pulses:  2+ and symmetric    Skin:  No jaundice, rashes, or lesions    Lymph nodes:  No palpable cervical lymphadenopathy        Lab Results:   No visits with results within 1 Day(s) from this visit  Latest known visit with results is:   Orders Only on 04/01/2022   Component Date Value    Creatinine, Urine 04/01/2022 162     Microalbum  ,U,Random 04/01/2022 7 2     Microalb/Creat Ratio 04/01/2022 44*    Glucose, Random 04/01/2022 112*    BUN 04/01/2022 10     Creatinine 04/01/2022 0 63*    eGFR Non  04/01/2022 113     eGFR  04/01/2022 131     SL AMB BUN/CREATININE RA* 04/01/2022 16     Sodium 04/01/2022 140     Potassium 04/01/2022 4 1     Chloride 04/01/2022 103     CO2 04/01/2022 31     Calcium 04/01/2022 9 2     Protein, Total 04/01/2022 6 8     Albumin 04/01/2022 4 0     Globulin 04/01/2022 2 8     Albumin/Globulin Ratio 04/01/2022 1 4     TOTAL BILIRUBIN 04/01/2022 0 7     Alkaline Phosphatase 04/01/2022 96     AST 04/01/2022 22     ALT 04/01/2022 29     HEP C AB 04/01/2022 NON-REACTIVE     Signal to Cut-Off 04/01/2022 0 24     Hemoglobin A1C 04/01/2022 7 2*         Radiology Results:   No results found

## 2022-05-17 ENCOUNTER — ANESTHESIA EVENT (OUTPATIENT)
Dept: GASTROENTEROLOGY | Facility: AMBULARY SURGERY CENTER | Age: 52
End: 2022-05-17

## 2022-05-17 ENCOUNTER — ANESTHESIA (OUTPATIENT)
Dept: GASTROENTEROLOGY | Facility: AMBULARY SURGERY CENTER | Age: 52
End: 2022-05-17

## 2022-05-17 ENCOUNTER — HOSPITAL ENCOUNTER (OUTPATIENT)
Dept: GASTROENTEROLOGY | Facility: AMBULARY SURGERY CENTER | Age: 52
Setting detail: OUTPATIENT SURGERY
Discharge: HOME | End: 2022-05-17
Attending: INTERNAL MEDICINE
Payer: COMMERCIAL

## 2022-05-17 VITALS
TEMPERATURE: 97.3 F | SYSTOLIC BLOOD PRESSURE: 162 MMHG | HEART RATE: 69 BPM | RESPIRATION RATE: 20 BRPM | OXYGEN SATURATION: 94 % | DIASTOLIC BLOOD PRESSURE: 96 MMHG

## 2022-05-17 DIAGNOSIS — Z12.11 SCREEN FOR COLON CANCER: ICD-10-CM

## 2022-05-17 PROBLEM — E66.813 CLASS 3 SEVERE OBESITY IN ADULT (HCC): Status: ACTIVE | Noted: 2019-03-13

## 2022-05-17 PROBLEM — G47.30 SLEEP APNEA: Status: ACTIVE | Noted: 2018-05-29

## 2022-05-17 LAB — GLUCOSE SERPL-MCNC: 168 MG/DL (ref 65–140)

## 2022-05-17 PROCEDURE — 88305 TISSUE EXAM BY PATHOLOGIST: CPT | Performed by: PATHOLOGY

## 2022-05-17 PROCEDURE — 45385 COLONOSCOPY W/LESION REMOVAL: CPT | Performed by: INTERNAL MEDICINE

## 2022-05-17 PROCEDURE — 82948 REAGENT STRIP/BLOOD GLUCOSE: CPT

## 2022-05-17 RX ORDER — SODIUM CHLORIDE, SODIUM LACTATE, POTASSIUM CHLORIDE, CALCIUM CHLORIDE 600; 310; 30; 20 MG/100ML; MG/100ML; MG/100ML; MG/100ML
125 INJECTION, SOLUTION INTRAVENOUS CONTINUOUS
Status: DISCONTINUED | OUTPATIENT
Start: 2022-05-17 | End: 2022-05-21 | Stop reason: HOSPADM

## 2022-05-17 RX ORDER — LIDOCAINE HYDROCHLORIDE 10 MG/ML
0.5 INJECTION, SOLUTION EPIDURAL; INFILTRATION; INTRACAUDAL; PERINEURAL ONCE AS NEEDED
Status: DISCONTINUED | OUTPATIENT
Start: 2022-05-17 | End: 2022-05-21 | Stop reason: HOSPADM

## 2022-05-17 RX ORDER — LIDOCAINE HYDROCHLORIDE 10 MG/ML
INJECTION, SOLUTION EPIDURAL; INFILTRATION; INTRACAUDAL; PERINEURAL AS NEEDED
Status: DISCONTINUED | OUTPATIENT
Start: 2022-05-17 | End: 2022-05-17

## 2022-05-17 RX ORDER — PROPOFOL 10 MG/ML
INJECTION, EMULSION INTRAVENOUS CONTINUOUS PRN
Status: DISCONTINUED | OUTPATIENT
Start: 2022-05-17 | End: 2022-05-17

## 2022-05-17 RX ORDER — PROPOFOL 10 MG/ML
INJECTION, EMULSION INTRAVENOUS AS NEEDED
Status: DISCONTINUED | OUTPATIENT
Start: 2022-05-17 | End: 2022-05-17

## 2022-05-17 RX ADMIN — SODIUM CHLORIDE, SODIUM LACTATE, POTASSIUM CHLORIDE, AND CALCIUM CHLORIDE: .6; .31; .03; .02 INJECTION, SOLUTION INTRAVENOUS at 08:25

## 2022-05-17 RX ADMIN — PROPOFOL 100 MG: 10 INJECTION, EMULSION INTRAVENOUS at 08:28

## 2022-05-17 RX ADMIN — PROPOFOL 50 MG: 10 INJECTION, EMULSION INTRAVENOUS at 08:29

## 2022-05-17 RX ADMIN — LIDOCAINE HYDROCHLORIDE 50 MG: 10 INJECTION, SOLUTION EPIDURAL; INFILTRATION; INTRACAUDAL at 08:28

## 2022-05-17 RX ADMIN — PROPOFOL 140 MCG/KG/MIN: 10 INJECTION, EMULSION INTRAVENOUS at 08:28

## 2022-05-17 NOTE — H&P
History and Physical -  [facility]  Jason Rubi 45 y o  male MRN: [id number]                  HPI: Jason Rubi is a 45y o  year old male who presents for dex screening colonoscopy      REVIEW OF SYSTEMS: Per the HPI, and otherwise unremarkable      Historical Information   Past Medical History:   Diagnosis Date    Acute bacterial prostatitis 2005    Last Assessed: 2005     Atrial flutter (HCC)     Last Assessed: 2017     Cellulitis of toe of right foot     Last Assessed: 3/10/2017     CPAP (continuous positive airway pressure) dependence     Dermatophytosis of groin     Last Assessed: 3/21/2016     Diabetes mellitus ([facility] [address] )     Erectile dysfunction     Hypertension     Hypokalemia     Last Assessed: 2015     Obesity     Last Assessed: 2015     Onychomycosis     Last Assessed: 2/15/2017     Sleep apnea     Tinea pedis of both feet     Last Assessed: 2016     Type 2 diabetes mellitus (HCC)      Past Surgical History:   Procedure Laterality Date    CARDIAC CATHETERIZATION      CARDIAC SURGERY      flutter repair-ablation    VASECTOMY      Last Assessed: 2015      Social History   Social History     Substance and Sexual Activity   Alcohol Use Yes    Comment: 1 drink/month- occasional alcohol use      Social History     Substance and Sexual Activity   Drug Use No     Social History     Tobacco Use   Smoking Status Former Smoker    Packs/day: 1 00    Years:  00    Pack years: 11 00    Quit date:     Years since quittin 3   Smokeless Tobacco Never Used     Family History   Problem Relation Age of Onset    Arthritis Mother     Arrhythmia Mother     Hypertension Mother     Anxiety disorder Father     Arthritis Father     Atrial fibrillation Father     Hypertension Father     Other Father         Impaired cognition     Mental illness Neg Hx     Diabetes Neg Hx     Cancer Neg Hx     Stroke Neg Hx Meds/Allergies       Current Outpatient Medications:     amLODIPine (NORVASC) 5 mg tablet    carvedilol (COREG) 25 mg tablet    Cholecalciferol (VITAMIN D) 2000 units tablet    Coenzyme Q10 (COQ-10) 200 MG CAPS    Farxiga 10 MG TABS    lisinopril (ZESTRIL) 20 mg tablet    metFORMIN (GLUCOPHAGE) 1000 MG tablet    rosuvastatin (CRESTOR) 20 MG tablet    sildenafil (VIAGRA) 100 mg tablet    spironolactone (ALDACTONE) 25 mg tablet    Clenpiq 10-3 5-12 MG-GM -GM/160ML SOLN    glucose blood test strip    insulin degludec (Tresiba FlexTouch) 200 units/mL CONCENTRATED U-200 injection pen    Insulin Pen Needle (B-D ULTRAFINE III SHORT PEN) 31G X 8 MM MISC    Omega-3 Fatty Acids (fish oil) 1,000 mg    ONETOUCH DELICA LANCETS FINE MISC    Current Facility-Administered Medications:     lactated ringers infusion, 125 mL/hr, Intravenous, Continuous    lidocaine (PF) (XYLOCAINE-MPF) 1 % injection 0 5 mL, 0 5 mL, Infiltration, Once PRN    No Known Allergies    Objective     BP (!) 174/100   Pulse 79   Temp (!) 97 3 °F (36 3 °C) (Temporal)   Resp 16   SpO2 97%       PHYSICAL EXAM    Gen: NAD  Head: NCAT  CV: RRR  CHEST: Clear  ABD: soft, NT/ND  EXT: no edema      ASSESSMENT/PLAN:  This is a 45y o  year old male here for colonoscopy, and he is stable and optimized for his procedure

## 2022-06-02 DIAGNOSIS — N52.01 ERECTILE DYSFUNCTION DUE TO ARTERIAL INSUFFICIENCY: ICD-10-CM

## 2022-06-03 RX ORDER — SILDENAFIL 100 MG/1
100 TABLET, FILM COATED ORAL AS NEEDED
Qty: 10 TABLET | Refills: 5 | Status: SHIPPED | OUTPATIENT
Start: 2022-06-03

## 2022-09-09 LAB
ALBUMIN SERPL-MCNC: 4 G/DL (ref 3.6–5.1)
ALBUMIN/GLOB SERPL: 1.3 (CALC) (ref 1–2.5)
ALP SERPL-CCNC: 95 U/L (ref 35–144)
ALT SERPL-CCNC: 26 U/L (ref 9–46)
AST SERPL-CCNC: 19 U/L (ref 10–35)
BILIRUB SERPL-MCNC: 0.6 MG/DL (ref 0.2–1.2)
BUN SERPL-MCNC: 10 MG/DL (ref 7–25)
BUN/CREAT SERPL: ABNORMAL (CALC) (ref 6–22)
CALCIUM SERPL-MCNC: 9.4 MG/DL (ref 8.6–10.3)
CHLORIDE SERPL-SCNC: 101 MMOL/L (ref 98–110)
CHOLEST SERPL-MCNC: 118 MG/DL
CHOLEST/HDLC SERPL: 3.4 (CALC)
CO2 SERPL-SCNC: 29 MMOL/L (ref 20–32)
CREAT SERPL-MCNC: 0.71 MG/DL (ref 0.7–1.3)
GFR/BSA.PRED SERPLBLD CYS-BASED-ARV: 110 ML/MIN/1.73M2
GLOBULIN SER CALC-MCNC: 3.1 G/DL (CALC) (ref 1.9–3.7)
GLUCOSE SERPL-MCNC: 143 MG/DL (ref 65–99)
HBA1C MFR BLD: 8.4 % OF TOTAL HGB
HDLC SERPL-MCNC: 35 MG/DL
LDLC SERPL CALC-MCNC: 60 MG/DL (CALC)
NONHDLC SERPL-MCNC: 83 MG/DL (CALC)
POTASSIUM SERPL-SCNC: 4.6 MMOL/L (ref 3.5–5.3)
PROT SERPL-MCNC: 7.1 G/DL (ref 6.1–8.1)
SODIUM SERPL-SCNC: 139 MMOL/L (ref 135–146)
TRIGL SERPL-MCNC: 146 MG/DL

## 2022-09-09 PROCEDURE — 3052F HG A1C>EQUAL 8.0%<EQUAL 9.0%: CPT | Performed by: FAMILY MEDICINE

## 2022-09-14 ENCOUNTER — OFFICE VISIT (OUTPATIENT)
Dept: FAMILY MEDICINE CLINIC | Facility: CLINIC | Age: 52
End: 2022-09-14
Payer: COMMERCIAL

## 2022-09-14 VITALS
HEART RATE: 87 BPM | DIASTOLIC BLOOD PRESSURE: 100 MMHG | SYSTOLIC BLOOD PRESSURE: 164 MMHG | TEMPERATURE: 97.2 F | RESPIRATION RATE: 18 BRPM | BODY MASS INDEX: 44.1 KG/M2 | OXYGEN SATURATION: 97 % | HEIGHT: 71 IN | WEIGHT: 315 LBS

## 2022-09-14 DIAGNOSIS — I10 BENIGN ESSENTIAL HYPERTENSION: ICD-10-CM

## 2022-09-14 DIAGNOSIS — E78.2 MIXED HYPERLIPIDEMIA: ICD-10-CM

## 2022-09-14 DIAGNOSIS — E11.42 DIABETIC POLYNEUROPATHY ASSOCIATED WITH TYPE 2 DIABETES MELLITUS (HCC): Primary | ICD-10-CM

## 2022-09-14 DIAGNOSIS — Z23 NEED FOR INFLUENZA VACCINATION: ICD-10-CM

## 2022-09-14 DIAGNOSIS — I50.20 SYSTOLIC HEART FAILURE (HCC): ICD-10-CM

## 2022-09-14 DIAGNOSIS — E66.01 MORBID OBESITY WITH BMI OF 45.0-49.9, ADULT (HCC): ICD-10-CM

## 2022-09-14 DIAGNOSIS — I50.20 MALIGNANT HYPERTENSION WITH SYSTOLIC CHF, NYHA CLASS 2 (HCC): ICD-10-CM

## 2022-09-14 DIAGNOSIS — E78.5 DYSLIPIDEMIA: ICD-10-CM

## 2022-09-14 DIAGNOSIS — I11.0 MALIGNANT HYPERTENSION WITH SYSTOLIC CHF, NYHA CLASS 2 (HCC): ICD-10-CM

## 2022-09-14 PROCEDURE — 3080F DIAST BP >= 90 MM HG: CPT | Performed by: FAMILY MEDICINE

## 2022-09-14 PROCEDURE — 3077F SYST BP >= 140 MM HG: CPT | Performed by: FAMILY MEDICINE

## 2022-09-14 PROCEDURE — 4010F ACE/ARB THERAPY RXD/TAKEN: CPT | Performed by: FAMILY MEDICINE

## 2022-09-14 PROCEDURE — 90682 RIV4 VACC RECOMBINANT DNA IM: CPT

## 2022-09-14 PROCEDURE — 99214 OFFICE O/P EST MOD 30 MIN: CPT | Performed by: FAMILY MEDICINE

## 2022-09-14 PROCEDURE — 90471 IMMUNIZATION ADMIN: CPT

## 2022-09-14 RX ORDER — LISINOPRIL 40 MG/1
40 TABLET ORAL EVERY MORNING
Qty: 90 TABLET | Refills: 1 | Status: SHIPPED | OUTPATIENT
Start: 2022-09-14 | End: 2023-03-13

## 2022-09-14 NOTE — PATIENT INSTRUCTIONS
Obesity   AMBULATORY CARE:   Obesity  means your body mass index (BMI) is greater than 30  Your healthcare provider will use your height and weight to measure your BMI  The risks of obesity include  many health problems, including injuries or physical disability  · Diabetes (high blood sugar level)    · High blood pressure or high cholesterol    · Heart disease    · Stroke    · Gallbladder or liver disease    · Cancer of the colon, breast, prostate, liver, or kidney    · Sleep apnea    · Arthritis or gout    Screening  is done to check for health conditions before you have signs or symptoms  If you are 28to 79years old, your blood sugar level may be checked every 3 years for signs of prediabetes or diabetes  Your healthcare provider will check your blood pressure at each visit  High blood pressure can lead to a stroke or other problems  Your provider may check for signs of heart disease, cancer, or other health problems  Seek care immediately if:   · You have a severe headache, confusion, or difficulty speaking  · You have weakness on one side of your body  · You have chest pain, sweating, or shortness of breath  Call your doctor if:   · You have symptoms of gallbladder or liver disease, such as pain in your upper abdomen  · You have knee or hip pain and discomfort while walking  · You have symptoms of diabetes, such as intense hunger and thirst, and frequent urination  · You have symptoms of sleep apnea, such as snoring or daytime sleepiness  · You have questions or concerns about your condition or care  Treatment for obesity  focuses on helping you lose weight to improve your health  Even a small decrease in BMI can reduce the risk for many health problems  Your healthcare provider will help you set a weight-loss goal   · Lifestyle changes  are the first step in treating obesity  These include making healthy food choices and getting regular physical activity   Your healthcare provider may suggest a weight-loss program that involves coaching, education, and therapy  · Medicine  may help you lose weight when it is used with a healthy foods and physical activity  · Surgery  can help you lose weight if you are very obese and have other health problems  There are several types of weight-loss surgery  Ask your healthcare provider for more information  Tips for safe weight loss:   · Set small, realistic goals  An example of a small goal is to walk for 20 minutes 5 days a week  Anther goal is to lose 5% of your body weight  · Tell friends, family members, and coworkers about your goals  and ask for their support  Ask a friend to lose weight with you, or join a weight-loss support group  · Identify foods or triggers that may cause you to overeat , and find ways to avoid them  Remove tempting high-calorie foods from your home and workplace  Place a bowl of fresh fruit on your kitchen counter  If stress causes you to eat, then find other ways to cope with stress  A counselor or therapist may be able to help you  · Keep a diary to track what you eat and drink  Also write down how many minutes of physical activity you do each day  Weigh yourself once a week and record it in your diary  Eating changes: You will need to eat 500 to 1,000 fewer calories each day than you currently eat to lose 1 to 2 pounds a week  The following changes will help you cut calories:  · Eat smaller portions  Use small plates, no larger than 9 inches in diameter  Fill your plate half full of fruits and vegetables  Measure your food using measuring cups until you know what a serving size looks like  · Eat 3 meals and 1 or 2 snacks each day  Plan your meals in advance  Viktoriya Laughter and eat at home most of the time  Eat slowly  Do not skip meals  Skipping meals can lead to overeating later in the day  This can make it harder for you to lose weight   Talk with a dietitian to help you make a meal plan and schedule that is right for you  · Eat fruits and vegetables at every meal   They are low in calories and high in fiber, which makes you feel full  Do not add butter, margarine, or cream sauce to vegetables  Use herbs to season steamed vegetables  · Eat less fat and fewer fried foods  Eat more baked or grilled chicken and fish  These protein sources are lower in calories and fat than red meat  Limit fast food  Dress your salads with olive oil and vinegar instead of bottled dressing  · Limit the amount of sugar you eat  Do not drink sugary beverages  Limit alcohol  Activity changes:  Physical activity is good for your body in many ways  It helps you burn calories and build strong muscles  It decreases stress and depression, and improves your mood  It can also help you sleep better  Talk to your healthcare provider before you begin an exercise program   · Exercise for at least 30 minutes 5 days a week  Start slowly  Set aside time each day for physical activity that you enjoy and that is convenient for you  It is best to do both weight training and an activity that increases your heart rate, such as walking, bicycling, or swimming  · Find ways to be more active  Do yard work and housecleaning  Walk up the stairs instead of using elevators  Spend your leisure time going to events that require walking, such as outdoor festivals or fairs  This extra physical activity can help you lose weight and keep it off  Follow up with your doctor as directed: You may need to meet with a dietitian  Write down your questions so you remember to ask them during your visits  © Copyright [employer] 2022 Information is for End User's use only and may not be sold, redistributed or otherwise used for commercial purposes  All illustrations and images included in CareNotes® are the copyrighted property of [employer]  or Estevan Jarvis   The above information is an  only   It is not intended as medical advice for individual conditions or treatments  Talk to your doctor, nurse or pharmacist before following any medical regimen to see if it is safe and effective for you

## 2022-09-14 NOTE — PROGRESS NOTES
Assessment/Plan:    1  Diabetic polyneuropathy associated with type 2 diabetes mellitus (HCC)  -     Semaglutide,0 25 or 0 5MG/DOS, 2 MG/1 5ML SOPN; Inject 0 25 mg under the skin every 7 days for 30 days, THEN 0 5 mg every 7 days   -     Hemoglobin A1C; Future; Expected date: 12/14/2022  -     Comprehensive metabolic panel; Future; Expected date: 12/14/2022  -     Microalbumin / creatinine urine ratio; Future; Expected date: 12/14/2022  -     Comprehensive metabolic panel; Future; Expected date: 10/14/2022  -     Hemoglobin A1C; Future; Expected date: 10/14/2022    2  Need for influenza vaccination  -     influenza vaccine, quadrivalent, recombinant, PF, 0 5 mL, for patients 18 yr+ (FLUBLOK)    3  Mixed hyperlipidemia  -     Lipid Panel with Direct LDL reflex; Future; Expected date: 12/14/2022    4  Benign essential hypertension    5  Dyslipidemia  -     lisinopril (ZESTRIL) 40 mg tablet; Take 1 tablet (40 mg total) by mouth every morning    6  Malignant hypertension with systolic CHF, NYHA class 2 (HCC)  -     lisinopril (ZESTRIL) 40 mg tablet; Take 1 tablet (40 mg total) by mouth every morning    7  Systolic heart failure (HCC)  -     lisinopril (ZESTRIL) 40 mg tablet; Take 1 tablet (40 mg total) by mouth every morning    8  Morbid obesity with BMI of 45 0-49 9, adult ([facility] [address] )  Pt will be strated on ozempic - already stopped the farxiga  Will follow numbers in 1 month and three months        Patient Instructions     Obesity   AMBULATORY CARE:   Obesity  means your body mass index (BMI) is greater than 30  Your healthcare provider will use your height and weight to measure your BMI  The risks of obesity include  many health problems, including injuries or physical disability    Diabetes (high blood sugar level)    High blood pressure or high cholesterol    Heart disease    Stroke    Gallbladder or liver disease    Cancer of the colon, breast, prostate, liver, or kidney    Sleep apnea    Arthritis or gout    Screening is done to check for health conditions before you have signs or symptoms  If you are 28to 79years old, your blood sugar level may be checked every 3 years for signs of prediabetes or diabetes  Your healthcare provider will check your blood pressure at each visit  High blood pressure can lead to a stroke or other problems  Your provider may check for signs of heart disease, cancer, or other health problems  Seek care immediately if:   You have a severe headache, confusion, or difficulty speaking  You have weakness on one side of your body  You have chest pain, sweating, or shortness of breath  Call your doctor if:   You have symptoms of gallbladder or liver disease, such as pain in your upper abdomen  You have knee or hip pain and discomfort while walking  You have symptoms of diabetes, such as intense hunger and thirst, and frequent urination  You have symptoms of sleep apnea, such as snoring or daytime sleepiness  You have questions or concerns about your condition or care  Treatment for obesity  focuses on helping you lose weight to improve your health  Even a small decrease in BMI can reduce the risk for many health problems  Your healthcare provider will help you set a weight-loss goal   Lifestyle changes  are the first step in treating obesity  These include making healthy food choices and getting regular physical activity  Your healthcare provider may suggest a weight-loss program that involves coaching, education, and therapy  Medicine  may help you lose weight when it is used with a healthy foods and physical activity  Surgery  can help you lose weight if you are very obese and have other health problems  There are several types of weight-loss surgery  Ask your healthcare provider for more information  Tips for safe weight loss:   Set small, realistic goals  An example of a small goal is to walk for 20 minutes 5 days a week   Awilda goal is to lose 5% of your body weight  Tell friends, family members, and coworkers about your goals  and ask for their support  Ask a friend to lose weight with you, or join a weight-loss support group  Identify foods or triggers that may cause you to overeat , and find ways to avoid them  Remove tempting high-calorie foods from your home and workplace  Place a bowl of fresh fruit on your kitchen counter  If stress causes you to eat, then find other ways to cope with stress  A counselor or therapist may be able to help you  Keep a diary to track what you eat and drink  Also write down how many minutes of physical activity you do each day  Weigh yourself once a week and record it in your diary  Eating changes: You will need to eat 500 to 1,000 fewer calories each day than you currently eat to lose 1 to 2 pounds a week  The following changes will help you cut calories:  Eat smaller portions  Use small plates, no larger than 9 inches in diameter  Fill your plate half full of fruits and vegetables  Measure your food using measuring cups until you know what a serving size looks like  Eat 3 meals and 1 or 2 snacks each day  Plan your meals in advance  Rosario Pereyra and eat at home most of the time  Eat slowly  Do not skip meals  Skipping meals can lead to overeating later in the day  This can make it harder for you to lose weight  Talk with a dietitian to help you make a meal plan and schedule that is right for you  Eat fruits and vegetables at every meal   They are low in calories and high in fiber, which makes you feel full  Do not add butter, margarine, or cream sauce to vegetables  Use herbs to season steamed vegetables  Eat less fat and fewer fried foods  Eat more baked or grilled chicken and fish  These protein sources are lower in calories and fat than red meat  Limit fast food  Dress your salads with olive oil and vinegar instead of bottled dressing  Limit the amount of sugar you eat  Do not drink sugary beverages  Limit alcohol  Activity changes:  Physical activity is good for your body in many ways  It helps you burn calories and build strong muscles  It decreases stress and depression, and improves your mood  It can also help you sleep better  Talk to your healthcare provider before you begin an exercise program   Exercise for at least 30 minutes 5 days a week  Start slowly  Set aside time each day for physical activity that you enjoy and that is convenient for you  It is best to do both weight training and an activity that increases your heart rate, such as walking, bicycling, or swimming  Find ways to be more active  Do yard work and housecleaning  Walk up the stairs instead of using elevators  Spend your leisure time going to events that require walking, such as outdoor festivals or fairs  This extra physical activity can help you lose weight and keep it off  Follow up with your doctor as directed: You may need to meet with a dietitian  Write down your questions so you remember to ask them during your visits  © Copyright [employer] 2022 Information is for End User's use only and may not be sold, redistributed or otherwise used for commercial purposes  All illustrations and images included in CareNotes® are the copyrighted property of A D A M , Inc  or [employer]   The above information is an  only  It is not intended as medical advice for individual conditions or treatments  Talk to your doctor, nurse or pharmacist before following any medical regimen to see if it is safe and effective for you  Return in 3 months (on 12/14/2022) for Diabetes Follow-up  Subjective:      Patient ID: Vesna Canada is a 47 y o  male  Chief Complaint   Patient presents with    Follow-up     3mo f/u  Ginger Bernstein         Pt is here for a 3 month follow up  Pt had labs   Feels well    Pt states he stopped taking the farxiga 2-3 months ago    Was giving him a rash and it would not go away      The following portions of the patient's history were reviewed and updated as appropriate: allergies, current medications, past family history, past medical history, past social history, past surgical history and problem list     Review of Systems   Constitutional: Negative for activity change, appetite change, chills, diaphoresis, fatigue, fever and unexpected weight change  HENT: Negative for congestion, dental problem, ear pain, mouth sores, sinus pressure, sinus pain, sore throat and trouble swallowing  Eyes: Negative for photophobia, discharge and itching  Respiratory: Negative for apnea, chest tightness and shortness of breath  Cardiovascular: Negative for chest pain, palpitations and leg swelling  Gastrointestinal: Negative for abdominal distention, abdominal pain, blood in stool, nausea and vomiting  Endocrine: Negative for cold intolerance, heat intolerance, polydipsia, polyphagia and polyuria  Genitourinary: Negative for difficulty urinating  Musculoskeletal: Negative for arthralgias  Skin: Negative for color change and wound  Neurological: Negative for dizziness, syncope, speech difficulty and headaches  Hematological: Negative for adenopathy  Psychiatric/Behavioral: Negative for agitation and behavioral problems           Current Outpatient Medications   Medication Sig Dispense Refill    amLODIPine (NORVASC) 5 mg tablet Take 1 tablet (5 mg total) by mouth daily (Patient taking differently: Take 5 mg by mouth every morning) 90 tablet 3    carvedilol (COREG) 25 mg tablet Take 1 tablet (25 mg total) by mouth 2 (two) times a day with meals 180 tablet 0    Cholecalciferol (VITAMIN D) 2000 units tablet Take 1 tablet by mouth daily      insulin degludec Reino Love FlexTouch) 200 units/mL CONCENTRATED U-200 injection pen Inject 58 Units under the skin daily at bedtime 8 7 mL 5    Insulin Pen Needle (B-D ULTRAFINE III SHORT PEN) 31G X 8 MM MISC Inject under the skin 2 (two) times a day 100 each 5    lisinopril (ZESTRIL) 40 mg tablet Take 1 tablet (40 mg total) by mouth every morning 90 tablet 1    metFORMIN (GLUCOPHAGE) 1000 MG tablet TAKE 1 TABLET BY MOUTH (1,000 MG TOTAL) TWICE DAILY WITH MEALS 180 tablet 3    Omega-3 Fatty Acids (fish oil) 1,000 mg Take 2 capsules (2,000 mg total) by mouth 2 (two) times a day 60 capsule 3    rosuvastatin (CRESTOR) 20 MG tablet Take 1 tablet (20 mg total) by mouth daily 90 tablet 3    Semaglutide,0 25 or 0 5MG/DOS, 2 MG/1 5ML SOPN Inject 0 25 mg under the skin every 7 days for 30 days, THEN 0 5 mg every 7 days  4 5 mL 0    sildenafil (VIAGRA) 100 mg tablet Take 1 tablet (100 mg total) by mouth as needed for erectile dysfunction 10 tablet 5    spironolactone (ALDACTONE) 25 mg tablet Take 1 tablet (25 mg total) by mouth daily 90 tablet 3     No current facility-administered medications for this visit  Objective:    /100   Pulse 87   Temp (!) 97 2 °F (36 2 °C)   Resp 18   Ht 5' 11" (1 803 m)   Wt (!) 148 kg (326 lb 12 8 oz)   SpO2 97%   BMI 45 58 kg/m²        Physical Exam  Vitals and nursing note reviewed  Constitutional:       General: He is not in acute distress  Appearance: He is well-developed  He is not diaphoretic  HENT:      Head: Normocephalic and atraumatic  Right Ear: External ear normal       Left Ear: External ear normal       Nose: Nose normal       Mouth/Throat:      Pharynx: No oropharyngeal exudate  Eyes:      General: No scleral icterus  Right eye: No discharge  Left eye: No discharge  Pupils: Pupils are equal, round, and reactive to light  Neck:      Thyroid: No thyromegaly  Cardiovascular:      Rate and Rhythm: Normal rate  Heart sounds: Normal heart sounds  No murmur heard  Pulmonary:      Effort: Pulmonary effort is normal  No respiratory distress  Breath sounds: Normal breath sounds  No wheezing     Abdominal:      General: Bowel sounds are normal  There is no distension  Palpations: Abdomen is soft  There is no mass  Tenderness: There is no abdominal tenderness  There is no guarding or rebound  Musculoskeletal:         General: Normal range of motion  Skin:     General: Skin is warm and dry  Findings: No erythema or rash  Neurological:      Mental Status: He is alert  Coordination: Coordination normal       Deep Tendon Reflexes: Reflexes normal    Psychiatric:         Behavior: Behavior normal            Recent Results (from the past 672 hour(s))   Lipid Panel with Direct LDL reflex    Collection Time: 09/09/22  7:06 AM   Result Value Ref Range    Total Cholesterol 118 <200 mg/dL    HDL 35 (L) > OR = 40 mg/dL    Triglycerides 146 <150 mg/dL    LDL Calculated 60 mg/dL (calc)    Chol HDLC Ratio 3 4 <5 0 (calc)    Non-HDL Cholesterol 83 <130 mg/dL (calc)   Comprehensive metabolic panel    Collection Time: 09/09/22  7:06 AM   Result Value Ref Range    Glucose, Random 143 (H) 65 - 99 mg/dL    BUN 10 7 - 25 mg/dL    Creatinine 0 71 0 70 - 1 30 mg/dL    eGFR 110 > OR = 60 mL/min/1 73m2    SL AMB BUN/CREATININE RATIO NOT APPLICABLE 6 - 22 (calc)    Sodium 139 135 - 146 mmol/L    Potassium 4 6 3 5 - 5 3 mmol/L    Chloride 101 98 - 110 mmol/L    CO2 29 20 - 32 mmol/L    Calcium 9 4 8 6 - 10 3 mg/dL    Protein, Total 7 1 6 1 - 8 1 g/dL    Albumin 4 0 3 6 - 5 1 g/dL    Globulin 3 1 1 9 - 3 7 g/dL (calc)    Albumin/Globulin Ratio 1 3 1 0 - 2 5 (calc)    TOTAL BILIRUBIN 0 6 0 2 - 1 2 mg/dL    Alkaline Phosphatase 95 35 - 144 U/L    AST 19 10 - 35 U/L    ALT 26 9 - 46 U/L   Hemoglobin A1c (w/out EAG)    Collection Time: 09/09/22  7:06 AM   Result Value Ref Range    Hemoglobin A1C 8 4 (H) <5 7 % of total Hgb          Jose M Jansen DO  BMI Counseling: Body mass index is 45 58 kg/m²  The BMI is above normal  Nutrition recommendations include reducing portion sizes

## 2022-09-19 ENCOUNTER — TELEPHONE (OUTPATIENT)
Dept: FAMILY MEDICINE CLINIC | Facility: CLINIC | Age: 52
End: 2022-09-19

## 2022-09-19 NOTE — TELEPHONE ENCOUNTER
PA approved through 9/19/2023  [address] [place] [address] and informed them  They will let the patient know when it is ready to be picked up  DALIA Barrow LPN

## 2022-09-19 NOTE — TELEPHONE ENCOUNTER
Received a fax from pharmacy stating that the patients Ozempic needs a prior auth  Submitted prior auth through covermymeds, waiting for a response      Tim Ramey  QG:[id number]  Group: [id number]  PCN: [id number]  Bin: [id number] Karley Caldwell LPN

## 2022-10-22 LAB
ALBUMIN SERPL-MCNC: 4.3 G/DL (ref 3.6–5.1)
ALBUMIN/GLOB SERPL: 1.4 (CALC) (ref 1–2.5)
ALP SERPL-CCNC: 87 U/L (ref 35–144)
ALT SERPL-CCNC: 28 U/L (ref 9–46)
AST SERPL-CCNC: 25 U/L (ref 10–35)
BILIRUB SERPL-MCNC: 0.5 MG/DL (ref 0.2–1.2)
BUN SERPL-MCNC: 10 MG/DL (ref 7–25)
BUN/CREAT SERPL: 14 (CALC) (ref 6–22)
CALCIUM SERPL-MCNC: 9.3 MG/DL (ref 8.6–10.3)
CHLORIDE SERPL-SCNC: 100 MMOL/L (ref 98–110)
CO2 SERPL-SCNC: 28 MMOL/L (ref 20–32)
CREAT SERPL-MCNC: 0.69 MG/DL (ref 0.7–1.3)
GFR/BSA.PRED SERPLBLD CYS-BASED-ARV: 111 ML/MIN/1.73M2
GLOBULIN SER CALC-MCNC: 3 G/DL (CALC) (ref 1.9–3.7)
GLUCOSE SERPL-MCNC: 139 MG/DL (ref 65–99)
HBA1C MFR BLD: 8 % OF TOTAL HGB
POTASSIUM SERPL-SCNC: 4.1 MMOL/L (ref 3.5–5.3)
PROT SERPL-MCNC: 7.3 G/DL (ref 6.1–8.1)
SODIUM SERPL-SCNC: 138 MMOL/L (ref 135–146)

## 2022-11-26 DIAGNOSIS — E11.42 DIABETIC POLYNEUROPATHY ASSOCIATED WITH TYPE 2 DIABETES MELLITUS (HCC): ICD-10-CM

## 2022-11-26 RX ORDER — INSULIN DEGLUDEC 200 U/ML
INJECTION, SOLUTION SUBCUTANEOUS
Qty: 9 ML | Refills: 0 | Status: SHIPPED | OUTPATIENT
Start: 2022-11-26

## 2022-12-17 LAB
ALBUMIN SERPL-MCNC: 4.1 G/DL (ref 3.6–5.1)
ALBUMIN/CREAT UR: 56 MCG/MG CREAT
ALBUMIN/GLOB SERPL: 1.5 (CALC) (ref 1–2.5)
ALP SERPL-CCNC: 93 U/L (ref 35–144)
ALT SERPL-CCNC: 21 U/L (ref 9–46)
AST SERPL-CCNC: 16 U/L (ref 10–35)
BILIRUB SERPL-MCNC: 0.6 MG/DL (ref 0.2–1.2)
BUN SERPL-MCNC: 10 MG/DL (ref 7–25)
BUN/CREAT SERPL: ABNORMAL (CALC) (ref 6–22)
CALCIUM SERPL-MCNC: 9.3 MG/DL (ref 8.6–10.3)
CHLORIDE SERPL-SCNC: 101 MMOL/L (ref 98–110)
CHOLEST SERPL-MCNC: 97 MG/DL
CHOLEST/HDLC SERPL: 3.1 (CALC)
CO2 SERPL-SCNC: 28 MMOL/L (ref 20–32)
CREAT SERPL-MCNC: 0.73 MG/DL (ref 0.7–1.3)
CREAT UR-MCNC: 423 MG/DL (ref 20–320)
GFR/BSA.PRED SERPLBLD CYS-BASED-ARV: 109 ML/MIN/1.73M2
GLOBULIN SER CALC-MCNC: 2.8 G/DL (CALC) (ref 1.9–3.7)
GLUCOSE SERPL-MCNC: 143 MG/DL (ref 65–99)
HBA1C MFR BLD: 7.3 % OF TOTAL HGB
HDLC SERPL-MCNC: 31 MG/DL
LDLC SERPL CALC-MCNC: 45 MG/DL (CALC)
MICROALBUMIN UR-MCNC: 23.6 MG/DL
NONHDLC SERPL-MCNC: 66 MG/DL (CALC)
POTASSIUM SERPL-SCNC: 4.5 MMOL/L (ref 3.5–5.3)
PROT SERPL-MCNC: 6.9 G/DL (ref 6.1–8.1)
SODIUM SERPL-SCNC: 138 MMOL/L (ref 135–146)
TRIGL SERPL-MCNC: 125 MG/DL

## 2022-12-21 ENCOUNTER — OFFICE VISIT (OUTPATIENT)
Dept: FAMILY MEDICINE CLINIC | Facility: CLINIC | Age: 52
End: 2022-12-21

## 2022-12-21 VITALS
HEIGHT: 71 IN | DIASTOLIC BLOOD PRESSURE: 88 MMHG | BODY MASS INDEX: 44.1 KG/M2 | HEART RATE: 97 BPM | OXYGEN SATURATION: 98 % | WEIGHT: 315 LBS | TEMPERATURE: 97.1 F | SYSTOLIC BLOOD PRESSURE: 132 MMHG | RESPIRATION RATE: 16 BRPM

## 2022-12-21 DIAGNOSIS — E78.2 MIXED HYPERLIPIDEMIA: ICD-10-CM

## 2022-12-21 DIAGNOSIS — I10 BENIGN ESSENTIAL HYPERTENSION: ICD-10-CM

## 2022-12-21 DIAGNOSIS — E11.42 DIABETIC POLYNEUROPATHY ASSOCIATED WITH TYPE 2 DIABETES MELLITUS (HCC): Primary | ICD-10-CM

## 2022-12-21 RX ORDER — SEMAGLUTIDE 1.34 MG/ML
INJECTION, SOLUTION SUBCUTANEOUS
COMMUNITY
End: 2022-12-21 | Stop reason: SDUPTHER

## 2022-12-21 RX ORDER — INSULIN DEGLUDEC 200 U/ML
58 INJECTION, SOLUTION SUBCUTANEOUS DAILY
Qty: 26.1 ML | Refills: 1 | Status: SHIPPED | OUTPATIENT
Start: 2022-12-21 | End: 2023-06-19

## 2022-12-21 RX ORDER — SEMAGLUTIDE 1.34 MG/ML
0.5 INJECTION, SOLUTION SUBCUTANEOUS
Qty: 4.5 ML | Refills: 1 | Status: SHIPPED | OUTPATIENT
Start: 2022-12-21 | End: 2023-06-19

## 2022-12-21 NOTE — PROGRESS NOTES
Assessment/Plan:    1  Diabetic polyneuropathy associated with type 2 diabetes mellitus (HCC)  -     Semaglutide,0 25 or 0 5MG/DOS, (Ozempic, 0 25 or 0 5 MG/DOSE,) 2 MG/1 5ML SOPN; Inject 0 5 mg under the skin every 7 days  -     insulin degludec Emre Distance FlexTouch) 200 units/mL CONCENTRATED U-200 injection pen; Inject 58 Units under the skin daily  -     Hemoglobin A1C; Future; Expected date: 04/20/2023  -     Comprehensive metabolic panel; Future; Expected date: 04/20/2023  -     CBC and Platelet; Future; Expected date: 04/21/2023  -     Microalbumin / creatinine urine ratio; Future; Expected date: 04/20/2023    2  Benign essential hypertension  Assessment & Plan:  stable    Orders:  -     Hemoglobin A1C; Future; Expected date: 04/20/2023  -     Comprehensive metabolic panel; Future; Expected date: 04/20/2023  -     CBC and Platelet; Future; Expected date: 04/21/2023  -     Microalbumin / creatinine urine ratio; Future; Expected date: 04/20/2023    3  Mixed hyperlipidemia  -     Hemoglobin A1C; Future; Expected date: 04/20/2023  -     Comprehensive metabolic panel; Future; Expected date: 04/20/2023  -     CBC and Platelet; Future; Expected date: 04/21/2023  -     Microalbumin / creatinine urine ratio; Future; Expected date: 04/20/2023            There are no Patient Instructions on file for this visit  Return in 4 months (on 4/21/2023) for Diabetes follow-up  Subjective:      Patient ID: Jose Zamora is a 47 y o  male      Chief Complaint   Patient presents with   • Follow-up     Bw and meds  Donna Billingsley         Pt is here for a 3 month follow up  Pt had labs in October    Pt is on the ozempic - states he is not having issues with the med      The following portions of the patient's history were reviewed and updated as appropriate: allergies, current medications, past family history, past medical history, past social history, past surgical history and problem list     Review of Systems Constitutional: Negative for activity change, appetite change, chills, diaphoresis, fatigue, fever and unexpected weight change  HENT: Negative for congestion, dental problem, ear pain, mouth sores, sinus pressure, sinus pain, sore throat and trouble swallowing  Eyes: Negative for photophobia, discharge and itching  Respiratory: Negative for apnea, chest tightness and shortness of breath  Cardiovascular: Negative for chest pain, palpitations and leg swelling  Gastrointestinal: Negative for abdominal distention, abdominal pain, blood in stool, nausea and vomiting  Endocrine: Negative for cold intolerance, heat intolerance, polydipsia, polyphagia and polyuria  Genitourinary: Negative for difficulty urinating  Musculoskeletal: Negative for arthralgias  Skin: Negative for color change and wound  Neurological: Negative for dizziness, syncope, speech difficulty and headaches  Hematological: Negative for adenopathy  Psychiatric/Behavioral: Negative for agitation and behavioral problems           Current Outpatient Medications   Medication Sig Dispense Refill   • amLODIPine (NORVASC) 5 mg tablet Take 1 tablet (5 mg total) by mouth daily (Patient taking differently: Take 5 mg by mouth every morning) 90 tablet 3   • carvedilol (COREG) 25 mg tablet Take 1 tablet (25 mg total) by mouth 2 (two) times a day with meals 180 tablet 0   • Cholecalciferol (VITAMIN D) 2000 units tablet Take 1 tablet by mouth daily     • insulin degludec Ralph Loots FlexTouch) 200 units/mL CONCENTRATED U-200 injection pen Inject 58 Units under the skin daily 26 1 mL 1   • Insulin Pen Needle (B-D ULTRAFINE III SHORT PEN) 31G X 8 MM MISC Inject under the skin 2 (two) times a day 100 each 5   • lisinopril (ZESTRIL) 40 mg tablet Take 1 tablet (40 mg total) by mouth every morning 90 tablet 1   • metFORMIN (GLUCOPHAGE) 1000 MG tablet TAKE 1 TABLET BY MOUTH (1,000 MG TOTAL) TWICE DAILY WITH MEALS 180 tablet 1   • Omega-3 Fatty Acids (fish oil) 1,000 mg Take 2 capsules (2,000 mg total) by mouth 2 (two) times a day 60 capsule 3   • rosuvastatin (CRESTOR) 20 MG tablet Take 1 tablet (20 mg total) by mouth daily 90 tablet 3   • Semaglutide,0 25 or 0 5MG/DOS, (Ozempic, 0 25 or 0 5 MG/DOSE,) 2 MG/1 5ML SOPN Inject 0 5 mg under the skin every 7 days 4 5 mL 1   • sildenafil (VIAGRA) 100 mg tablet Take 1 tablet (100 mg total) by mouth as needed for erectile dysfunction 10 tablet 5   • spironolactone (ALDACTONE) 25 mg tablet Take 1 tablet (25 mg total) by mouth daily 90 tablet 3     No current facility-administered medications for this visit  Objective:    /88   Pulse 97   Temp (!) 97 1 °F (36 2 °C)   Resp 16   Ht 5' 11" (1 803 m)   Wt (!) 147 kg (324 lb)   SpO2 98%   BMI 45 19 kg/m²        Physical Exam  Vitals and nursing note reviewed  Constitutional:       General: He is not in acute distress  Appearance: He is well-developed  He is not diaphoretic  HENT:      Head: Normocephalic and atraumatic  Right Ear: External ear normal       Left Ear: External ear normal       Nose: Nose normal       Mouth/Throat:      Pharynx: No oropharyngeal exudate  Eyes:      General: No scleral icterus  Right eye: No discharge  Left eye: No discharge  Pupils: Pupils are equal, round, and reactive to light  Neck:      Thyroid: No thyromegaly  Cardiovascular:      Rate and Rhythm: Normal rate  Heart sounds: Normal heart sounds  No murmur heard  Pulmonary:      Effort: Pulmonary effort is normal  No respiratory distress  Breath sounds: Normal breath sounds  No wheezing  Abdominal:      General: Bowel sounds are normal  There is no distension  Palpations: Abdomen is soft  There is no mass  Tenderness: There is no abdominal tenderness  There is no guarding or rebound  Musculoskeletal:         General: Normal range of motion  Skin:     General: Skin is warm and dry        Findings: No erythema or rash  Neurological:      Mental Status: He is alert  Coordination: Coordination normal       Deep Tendon Reflexes: Reflexes normal    Psychiatric:         Behavior: Behavior normal               Recent Results (from the past 672 hour(s))   Lipid Panel with Direct LDL reflex    Collection Time: 12/16/22  7:06 AM   Result Value Ref Range    Total Cholesterol 97 <200 mg/dL    HDL 31 (L) > OR = 40 mg/dL    Triglycerides 125 <150 mg/dL    LDL Calculated 45 mg/dL (calc)    Chol HDLC Ratio 3 1 <5 0 (calc)    Non-HDL Cholesterol 66 <130 mg/dL (calc)   Microalbumin, Random Urine (W/Creatinine)    Collection Time: 12/16/22  7:06 AM   Result Value Ref Range    Creatinine, Urine 423 (H) 20 - 320 mg/dL    Microalbum  ,U,Random 23 6 See Note: mg/dL    Microalb/Creat Ratio 56 (H) <30 mcg/mg creat   Comprehensive metabolic panel    Collection Time: 12/16/22  7:06 AM   Result Value Ref Range    Glucose, Random 143 (H) 65 - 99 mg/dL    BUN 10 7 - 25 mg/dL    Creatinine 0 73 0 70 - 1 30 mg/dL    eGFR 109 > OR = 60 mL/min/1 73m2    SL AMB BUN/CREATININE RATIO NOT APPLICABLE 6 - 22 (calc)    Sodium 138 135 - 146 mmol/L    Potassium 4 5 3 5 - 5 3 mmol/L    Chloride 101 98 - 110 mmol/L    CO2 28 20 - 32 mmol/L    Calcium 9 3 8 6 - 10 3 mg/dL    Protein, Total 6 9 6 1 - 8 1 g/dL    Albumin 4 1 3 6 - 5 1 g/dL    Globulin 2 8 1 9 - 3 7 g/dL (calc)    Albumin/Globulin Ratio 1 5 1 0 - 2 5 (calc)    TOTAL BILIRUBIN 0 6 0 2 - 1 2 mg/dL    Alkaline Phosphatase 93 35 - 144 U/L    AST 16 10 - 35 U/L    ALT 21 9 - 46 U/L   Hemoglobin A1c (w/out EAG)    Collection Time: 12/16/22  7:06 AM   Result Value Ref Range    Hemoglobin A1C 7 3 (H) <5 7 % of total Hgb         Gopal Murphy DO

## 2022-12-22 ENCOUNTER — TELEPHONE (OUTPATIENT)
Dept: ADMINISTRATIVE | Facility: OTHER | Age: 52
End: 2022-12-22

## 2022-12-22 NOTE — LETTER
Diabetic Eye Exam Form    Date Requested: 22  Patient: Naomy Beckman  Patient : 1970   Referring Provider: Nakul Paul DO      DIABETIC Eye Exam Date _______________________________      Type of Exam MUST be documented for Diabetic Eye Exams  Please CHECK ONE  Retinal Exam       Dilated Retinal Exam       OCT       Optomap-Iris Exam      Fundus Photography       Left Eye - Please check Retinopathy or No Retinopathy        Exam did show retinopathy    Exam did not show retinopathy       Right Eye - Please check Retinopathy or No Retinopathy       Exam did show retinopathy    Exam did not show retinopathy       Comments __________________________________________________________    Practice Providing Exam ______________________________________________    Exam Performed By (print name) _______________________________________      Provider Signature ___________________________________________________      These reports are needed for  compliance  Please fax this completed form and a copy of the Diabetic Eye Exam report to our office located at [place] [address] as soon as possible via [phone] amie Fay Pellet: Phone [phone]  We thank you for your assistance in treating our mutual patient

## 2022-12-22 NOTE — LETTER
Diabetic Eye Exam Form    Date Requested: 23  Patient: April Presume  Patient : 1970   Referring Provider: Tania Cuevas DO      DIABETIC Eye Exam Date _______________________________      Type of Exam MUST be documented for Diabetic Eye Exams  Please CHECK ONE  Retinal Exam       Dilated Retinal Exam       OCT       Optomap-Iris Exam      Fundus Photography       Left Eye - Please check Retinopathy or No Retinopathy        Exam did show retinopathy    Exam did not show retinopathy       Right Eye - Please check Retinopathy or No Retinopathy       Exam did show retinopathy    Exam did not show retinopathy       Comments __________________________________________________________    Practice Providing Exam ______________________________________________    Exam Performed By (print name) _______________________________________      Provider Signature ___________________________________________________      These reports are needed for  compliance  Please fax this completed form and a copy of the Diabetic Eye Exam report to our office located at [place] [address] as soon as possible via [phone] attention Johnice Fothergill: Phone [phone]  We thank you for your assistance in treating our mutual patient

## 2022-12-22 NOTE — TELEPHONE ENCOUNTER
----- Message from Espinoza Herbert DO sent at 12/21/2022  5:23 PM EST -----  Regarding: eye exam  12/21/22 5:24 PM    Hello, our patient Adriana Greco has had Diabetic Eye Exam completed/performed  Please assist in updating the patient chart by making an External outreach to Dr Josesito Nieves facility located in [place]  The date of service is Feb ish      Thank you,  Espinoza Herbert DO  [facility]

## 2022-12-23 NOTE — TELEPHONE ENCOUNTER
Upon review of the In Basket request and the patient's chart, initial outreach has been made via fax to facility  Please see Contacts section for details       Thank you  Luis Norris MA

## 2023-01-03 NOTE — TELEPHONE ENCOUNTER
As a follow-up, a second attempt has been made for outreach via fax to facility  Please see Contacts section for details      Thank you  Cameron Melo MA

## 2023-01-06 NOTE — TELEPHONE ENCOUNTER
Upon review of the In Basket request we were able to locate, review, and update the patient chart as requested for Diabetic Eye Exam     Any additional questions or concerns should be emailed to the Practice Liaisons via the appropriate education email address, please do not reply via In Basket      Thank you  Izabela Hill MA

## 2023-02-13 ENCOUNTER — TELEPHONE (OUTPATIENT)
Dept: CARDIOLOGY CLINIC | Facility: CLINIC | Age: 53
End: 2023-02-13

## 2023-02-13 DIAGNOSIS — I10 CHRONIC HYPERTENSION: ICD-10-CM

## 2023-02-13 DIAGNOSIS — I10 HTN (HYPERTENSION), MALIGNANT: ICD-10-CM

## 2023-02-13 DIAGNOSIS — I11.0 MALIGNANT HYPERTENSION WITH SYSTOLIC CHF, NYHA CLASS 2 (HCC): ICD-10-CM

## 2023-02-13 DIAGNOSIS — I87.2 VENOUS INSUFFICIENCY: ICD-10-CM

## 2023-02-13 DIAGNOSIS — I50.20 MALIGNANT HYPERTENSION WITH SYSTOLIC CHF, NYHA CLASS 2 (HCC): ICD-10-CM

## 2023-02-13 DIAGNOSIS — I50.20 SYSTOLIC HEART FAILURE (HCC): ICD-10-CM

## 2023-02-13 RX ORDER — SPIRONOLACTONE 25 MG/1
TABLET ORAL
Qty: 90 TABLET | Refills: 3 | Status: SHIPPED | OUTPATIENT
Start: 2023-02-13

## 2023-02-13 RX ORDER — AMLODIPINE BESYLATE 5 MG/1
TABLET ORAL
Qty: 90 TABLET | Refills: 3 | Status: SHIPPED | OUTPATIENT
Start: 2023-02-13

## 2023-02-18 LAB
LEFT EYE DIABETIC RETINOPATHY: NORMAL
RIGHT EYE DIABETIC RETINOPATHY: NORMAL

## 2023-03-16 DIAGNOSIS — I10 HTN (HYPERTENSION), MALIGNANT: ICD-10-CM

## 2023-03-16 DIAGNOSIS — E78.5 DYSLIPIDEMIA: ICD-10-CM

## 2023-03-16 RX ORDER — ROSUVASTATIN CALCIUM 20 MG/1
TABLET, COATED ORAL
Qty: 90 TABLET | Refills: 3 | Status: SHIPPED | OUTPATIENT
Start: 2023-03-16

## 2023-03-16 RX ORDER — CARVEDILOL 25 MG/1
TABLET ORAL
Qty: 180 TABLET | Refills: 3 | Status: SHIPPED | OUTPATIENT
Start: 2023-03-16

## 2023-04-07 DIAGNOSIS — E78.5 DYSLIPIDEMIA: ICD-10-CM

## 2023-04-07 DIAGNOSIS — I50.20 SYSTOLIC HEART FAILURE (HCC): ICD-10-CM

## 2023-04-07 DIAGNOSIS — I50.20 MALIGNANT HYPERTENSION WITH SYSTOLIC CHF, NYHA CLASS 2 (HCC): ICD-10-CM

## 2023-04-07 DIAGNOSIS — I11.0 MALIGNANT HYPERTENSION WITH SYSTOLIC CHF, NYHA CLASS 2 (HCC): ICD-10-CM

## 2023-04-07 RX ORDER — LISINOPRIL 40 MG/1
TABLET ORAL
Qty: 90 TABLET | Refills: 0 | Status: SHIPPED | OUTPATIENT
Start: 2023-04-07

## 2023-05-13 LAB
ALBUMIN SERPL-MCNC: 4.1 G/DL (ref 3.6–5.1)
ALBUMIN/CREAT UR: 51 MCG/MG CREAT
ALBUMIN/GLOB SERPL: 1.5 (CALC) (ref 1–2.5)
ALP SERPL-CCNC: 86 U/L (ref 35–144)
ALT SERPL-CCNC: 30 U/L (ref 9–46)
AST SERPL-CCNC: 19 U/L (ref 10–35)
BILIRUB SERPL-MCNC: 0.7 MG/DL (ref 0.2–1.2)
BUN SERPL-MCNC: 10 MG/DL (ref 7–25)
BUN/CREAT SERPL: 14 (CALC) (ref 6–22)
CALCIUM SERPL-MCNC: 9.1 MG/DL (ref 8.6–10.3)
CHLORIDE SERPL-SCNC: 103 MMOL/L (ref 98–110)
CO2 SERPL-SCNC: 29 MMOL/L (ref 20–32)
CREAT SERPL-MCNC: 0.69 MG/DL (ref 0.7–1.3)
CREAT UR-MCNC: 187 MG/DL (ref 20–320)
ERYTHROCYTE [DISTWIDTH] IN BLOOD BY AUTOMATED COUNT: 13.7 % (ref 11–15)
GFR/BSA.PRED SERPLBLD CYS-BASED-ARV: 111 ML/MIN/1.73M2
GLOBULIN SER CALC-MCNC: 2.7 G/DL (CALC) (ref 1.9–3.7)
GLUCOSE SERPL-MCNC: 146 MG/DL (ref 65–99)
HBA1C MFR BLD: 8.2 % OF TOTAL HGB
HCT VFR BLD AUTO: 44.3 % (ref 38.5–50)
HGB BLD-MCNC: 15.1 G/DL (ref 13.2–17.1)
MCH RBC QN AUTO: 30 PG (ref 27–33)
MCHC RBC AUTO-ENTMCNC: 34.1 G/DL (ref 32–36)
MCV RBC AUTO: 87.9 FL (ref 80–100)
MICROALBUMIN UR-MCNC: 9.5 MG/DL
PLATELET # BLD AUTO: 213 THOUSAND/UL (ref 140–400)
PMV BLD REES-ECKER: 10.5 FL (ref 7.5–12.5)
POTASSIUM SERPL-SCNC: 4.4 MMOL/L (ref 3.5–5.3)
PROT SERPL-MCNC: 6.8 G/DL (ref 6.1–8.1)
RBC # BLD AUTO: 5.04 MILLION/UL (ref 4.2–5.8)
SODIUM SERPL-SCNC: 139 MMOL/L (ref 135–146)
WBC # BLD AUTO: 8.5 THOUSAND/UL (ref 3.8–10.8)

## 2023-05-17 ENCOUNTER — OFFICE VISIT (OUTPATIENT)
Dept: FAMILY MEDICINE CLINIC | Facility: CLINIC | Age: 53
End: 2023-05-17

## 2023-05-17 VITALS
SYSTOLIC BLOOD PRESSURE: 152 MMHG | RESPIRATION RATE: 18 BRPM | WEIGHT: 315 LBS | DIASTOLIC BLOOD PRESSURE: 94 MMHG | BODY MASS INDEX: 46.03 KG/M2 | TEMPERATURE: 99.4 F | HEART RATE: 98 BPM

## 2023-05-17 DIAGNOSIS — Z12.5 SCREENING FOR PROSTATE CANCER: ICD-10-CM

## 2023-05-17 DIAGNOSIS — I10 BENIGN ESSENTIAL HYPERTENSION: Primary | ICD-10-CM

## 2023-05-17 DIAGNOSIS — I50.20 MALIGNANT HYPERTENSION WITH SYSTOLIC CHF, NYHA CLASS 2 (HCC): ICD-10-CM

## 2023-05-17 DIAGNOSIS — E66.01 MORBID OBESITY WITH BMI OF 45.0-49.9, ADULT (HCC): ICD-10-CM

## 2023-05-17 DIAGNOSIS — E78.2 MIXED HYPERLIPIDEMIA: ICD-10-CM

## 2023-05-17 DIAGNOSIS — I10 CHRONIC HYPERTENSION: ICD-10-CM

## 2023-05-17 DIAGNOSIS — I10 HTN (HYPERTENSION), MALIGNANT: ICD-10-CM

## 2023-05-17 DIAGNOSIS — E11.42 DIABETIC POLYNEUROPATHY ASSOCIATED WITH TYPE 2 DIABETES MELLITUS (HCC): ICD-10-CM

## 2023-05-17 DIAGNOSIS — I50.20 SYSTOLIC HEART FAILURE (HCC): ICD-10-CM

## 2023-05-17 DIAGNOSIS — I11.0 MALIGNANT HYPERTENSION WITH SYSTOLIC CHF, NYHA CLASS 2 (HCC): ICD-10-CM

## 2023-05-17 DIAGNOSIS — I87.2 VENOUS INSUFFICIENCY: ICD-10-CM

## 2023-05-17 RX ORDER — AMLODIPINE BESYLATE 10 MG/1
10 TABLET ORAL DAILY
Qty: 90 TABLET | Refills: 1 | Status: SHIPPED | OUTPATIENT
Start: 2023-05-17

## 2023-05-17 NOTE — PATIENT INSTRUCTIONS
Obesity   AMBULATORY CARE:   Obesity  means your body mass index (BMI) is greater than 30  Your healthcare provider will use your age, height, and weight to measure your BMI  The risks of obesity include  many health problems, including injuries or physical disability  • Diabetes (high blood sugar level)    • High blood pressure or high cholesterol    • Heart disease    • Stroke    • Gallbladder or liver disease    • Cancer of the colon, breast, prostate, liver, or kidney    • Sleep apnea    • Arthritis or gout    Screening  is done to check for health conditions before you have signs or symptoms  If you are 28to 79years old, your blood sugar level may be checked every 3 years for signs of prediabetes or diabetes  Your healthcare provider will check your blood pressure at each visit  High blood pressure can lead to a stroke or other problems  Your provider may check for signs of heart disease, cancer, or other health problems  Seek care immediately if:   • You have a severe headache, confusion, or difficulty speaking  • You have weakness on one side of your body  • You have chest pain, sweating, or shortness of breath  Call your doctor if:   • You have symptoms of gallbladder or liver disease, such as pain in your upper abdomen  • You have knee or hip pain and discomfort while walking  • You have symptoms of diabetes, such as intense hunger and thirst, and frequent urination  • You have symptoms of sleep apnea, such as snoring or daytime sleepiness  • You have questions or concerns about your condition or care  Treatment for obesity  focuses on helping you lose weight to improve your health  Even a small decrease in BMI can reduce the risk for many health problems  Your healthcare provider will help you set a weight-loss goal   • Lifestyle changes  are the first step in treating obesity  These include making healthy food choices and getting regular physical activity   Your healthcare provider may suggest a weight-loss program that involves coaching, education, and therapy  • Medicine  may help you lose weight when it is used with a healthy foods and physical activity  • Surgery  can help you lose weight if you have obesity along with other health problems  Several types of weight-loss surgery are available  Ask your healthcare provider for more information  Tips for safe weight loss:   • Set small, realistic goals  An example of a small goal is to walk for 20 minutes 5 days a week  Anther goal is to lose 5% of your body weight  • Ask for support  Tell friends, family members, and coworkers about your goals  Ask someone to lose weight with you  You may also want to join a weight-loss support group  • Identify foods or triggers that may cause you to overeat  Remove tempting high-calorie foods from your home and workplace  Place a bowl of fresh fruit on your kitchen counter  If stress causes you to eat, find other ways to cope with stress  A counselor or therapist may be able to help you  • Track your daily calories and activity  Write down what you eat and drink  Also write down how many minutes of physical activity you do each day  • Track your weekly weight  Weigh yourself in the morning, before you eat or drink anything but after you use the bathroom  Use the same scale, in the same place, and in similar clothing each time  Only weigh yourself 1 to 2 times each week, or as directed  You may become discouraged if you weigh yourself every day  Eating changes: You will need to eat 500 to 1,000 fewer calories each day than you currently eat to lose 1 to 2 pounds a week  The following changes will help you cut calories:  • Eat smaller portions  Use small plates, no larger than 9 inches in diameter  Fill your plate half full of fruits and vegetables  Measure your food using measuring cups until you know what a serving size looks like           • Eat 3 meals and 1 or 2 snacks each day  Plan your meals in advance  Sofi Plata and eat at home most of the time  Eat slowly  Do not skip meals  Skipping meals can lead to overeating later in the day  This can make it harder for you to lose weight  Talk with a dietitian to help you make a meal plan and schedule that is right for you  • Eat fruits and vegetables at every meal   They are low in calories and high in fiber, which makes you feel full  Do not add butter, margarine, or cream sauce to vegetables  Use herbs to season steamed vegetables  • Eat less fat and fewer fried foods  Eat more baked or grilled chicken and fish  These protein sources are lower in calories and fat than red meat  Limit fast food  Dress your salads with olive oil and vinegar instead of bottled dressing  • Limit the amount of sugar you eat  Do not drink sugary beverages  Limit alcohol  Activity changes:  Physical activity is good for your body in many ways  It helps you burn calories and build strong muscles  It decreases stress and depression, and improves your mood  It can also help you sleep better  Talk to your healthcare provider before you begin an exercise program   • Exercise for at least 30 minutes 5 days a week  Start slowly  Set aside time each day for physical activity that you enjoy and that is convenient for you  It is best to do both weight training and an activity that increases your heart rate, such as walking, bicycling, or swimming  • Find ways to be more active  Do yard work and housecleaning  Walk up the stairs instead of using elevators  Spend your leisure time going to events that require walking, such as outdoor festivals or fairs  This extra physical activity can help you lose weight and keep it off  Follow up with your doctor as directed: You may need to meet with a dietitian  Write down your questions so you remember to ask them during your visits    © Copyright [employer] 2022 Information is for End User's use only and may not be sold, redistributed or otherwise used for commercial purposes  The above information is an  only  It is not intended as medical advice for individual conditions or treatments  Talk to your doctor, nurse or pharmacist before following any medical regimen to see if it is safe and effective for you

## 2023-05-17 NOTE — PROGRESS NOTES
Assessment/Plan:    1  Benign essential hypertension    2  Diabetic polyneuropathy associated with type 2 diabetes mellitus (HCC)  -     semaglutide, 1 mg/dose, (Ozempic) 4 mg/3 mL injection pen; Inject 0 75 mL (1 mg total) under the skin once a week  -     Albumin / creatinine urine ratio; Future; Expected date: 08/17/2023  -     Comprehensive metabolic panel; Future; Expected date: 08/17/2023  -     Hemoglobin A1C; Future; Expected date: 08/17/2023  -     Lipid Panel with Direct LDL reflex; Future; Expected date: 08/17/2023    3  Mixed hyperlipidemia    4  Morbid obesity with BMI of 45 0-49 9, adult ([facility] [address] )    5  Chronic hypertension  -     amLODIPine (NORVASC) 10 mg tablet; Take 1 tablet (10 mg total) by mouth daily    6  HTN (hypertension), malignant  -     amLODIPine (NORVASC) 10 mg tablet; Take 1 tablet (10 mg total) by mouth daily    7  Venous insufficiency  -     amLODIPine (NORVASC) 10 mg tablet; Take 1 tablet (10 mg total) by mouth daily    8  Malignant hypertension with systolic CHF, NYHA class 2 (HCC)  -     amLODIPine (NORVASC) 10 mg tablet; Take 1 tablet (10 mg total) by mouth daily    9  Systolic heart failure (HCC)  -     amLODIPine (NORVASC) 10 mg tablet; Take 1 tablet (10 mg total) by mouth daily    10  Screening for prostate cancer  -     PSA, Total Screen; Future          Patient Instructions     Obesity   AMBULATORY CARE:   Obesity  means your body mass index (BMI) is greater than 30  Your healthcare provider will use your age, height, and weight to measure your BMI  The risks of obesity include  many health problems, including injuries or physical disability  Diabetes (high blood sugar level)    High blood pressure or high cholesterol    Heart disease    Stroke    Gallbladder or liver disease    Cancer of the colon, breast, prostate, liver, or kidney    Sleep apnea    Arthritis or gout    Screening  is done to check for health conditions before you have signs or symptoms   If you are 35 to 70 years old, your blood sugar level may be checked every 3 years for signs of prediabetes or diabetes  Your healthcare provider will check your blood pressure at each visit  High blood pressure can lead to a stroke or other problems  Your provider may check for signs of heart disease, cancer, or other health problems  Seek care immediately if:   You have a severe headache, confusion, or difficulty speaking  You have weakness on one side of your body  You have chest pain, sweating, or shortness of breath  Call your doctor if:   You have symptoms of gallbladder or liver disease, such as pain in your upper abdomen  You have knee or hip pain and discomfort while walking  You have symptoms of diabetes, such as intense hunger and thirst, and frequent urination  You have symptoms of sleep apnea, such as snoring or daytime sleepiness  You have questions or concerns about your condition or care  Treatment for obesity  focuses on helping you lose weight to improve your health  Even a small decrease in BMI can reduce the risk for many health problems  Your healthcare provider will help you set a weight-loss goal   Lifestyle changes  are the first step in treating obesity  These include making healthy food choices and getting regular physical activity  Your healthcare provider may suggest a weight-loss program that involves coaching, education, and therapy  Medicine  may help you lose weight when it is used with a healthy foods and physical activity  Surgery  can help you lose weight if you have obesity along with other health problems  Several types of weight-loss surgery are available  Ask your healthcare provider for more information  Tips for safe weight loss:   Set small, realistic goals  An example of a small goal is to walk for 20 minutes 5 days a week  Awilda goal is to lose 5% of your body weight  Ask for support  Tell friends, family members, and coworkers about your goals   Ask someone to lose weight with you  You may also want to join a weight-loss support group  Identify foods or triggers that may cause you to overeat  Remove tempting high-calorie foods from your home and workplace  Place a bowl of fresh fruit on your kitchen counter  If stress causes you to eat, find other ways to cope with stress  A counselor or therapist may be able to help you  Track your daily calories and activity  Write down what you eat and drink  Also write down how many minutes of physical activity you do each day  Track your weekly weight  Weigh yourself in the morning, before you eat or drink anything but after you use the bathroom  Use the same scale, in the same place, and in similar clothing each time  Only weigh yourself 1 to 2 times each week, or as directed  You may become discouraged if you weigh yourself every day  Eating changes: You will need to eat 500 to 1,000 fewer calories each day than you currently eat to lose 1 to 2 pounds a week  The following changes will help you cut calories:  Eat smaller portions  Use small plates, no larger than 9 inches in diameter  Fill your plate half full of fruits and vegetables  Measure your food using measuring cups until you know what a serving size looks like  Eat 3 meals and 1 or 2 snacks each day  Plan your meals in advance  Rodrigues Cordon and eat at home most of the time  Eat slowly  Do not skip meals  Skipping meals can lead to overeating later in the day  This can make it harder for you to lose weight  Talk with a dietitian to help you make a meal plan and schedule that is right for you  Eat fruits and vegetables at every meal   They are low in calories and high in fiber, which makes you feel full  Do not add butter, margarine, or cream sauce to vegetables  Use herbs to season steamed vegetables  Eat less fat and fewer fried foods  Eat more baked or grilled chicken and fish   These protein sources are lower in calories and fat than red meat  Limit fast food  Dress your salads with olive oil and vinegar instead of bottled dressing  Limit the amount of sugar you eat  Do not drink sugary beverages  Limit alcohol  Activity changes:  Physical activity is good for your body in many ways  It helps you burn calories and build strong muscles  It decreases stress and depression, and improves your mood  It can also help you sleep better  Talk to your healthcare provider before you begin an exercise program   Exercise for at least 30 minutes 5 days a week  Start slowly  Set aside time each day for physical activity that you enjoy and that is convenient for you  It is best to do both weight training and an activity that increases your heart rate, such as walking, bicycling, or swimming  Find ways to be more active  Do yard work and housecleaning  Walk up the stairs instead of using elevators  Spend your leisure time going to events that require walking, such as outdoor festivals or fairs  This extra physical activity can help you lose weight and keep it off  Follow up with your doctor as directed: You may need to meet with a dietitian  Write down your questions so you remember to ask them during your visits  © Copyright [facility] 2022 Information is for End User's use only and may not be sold, redistributed or otherwise used for commercial purposes  The above information is an  only  It is not intended as medical advice for individual conditions or treatments  Talk to your doctor, nurse or pharmacist before following any medical regimen to see if it is safe and effective for you  Return in 3 months (on 8/17/2023) for , Annual physical     Subjective:      Patient ID: Shannan Kelly is a 47 y o  male      Chief Complaint   Patient presents with   • Follow-up   • Diabetes     Sas/cma       Pt is here to follow up labs  Pt has gained weight  Pt states he goes days without moving    Pt states he ad a few occasions where he could not get the ozempuic for a few weeks      The following portions of the patient's history were reviewed and updated as appropriate: allergies, current medications, past family history, past medical history, past social history, past surgical history and problem list     Review of Systems   Constitutional: Negative for activity change, appetite change, chills, diaphoresis, fatigue, fever and unexpected weight change  HENT: Negative for congestion, dental problem, ear pain, mouth sores, sinus pressure, sinus pain, sore throat and trouble swallowing  Eyes: Negative for photophobia, discharge and itching  Respiratory: Negative for apnea, chest tightness and shortness of breath  Cardiovascular: Negative for chest pain, palpitations and leg swelling  Gastrointestinal: Negative for abdominal distention, abdominal pain, blood in stool, nausea and vomiting  Endocrine: Negative for cold intolerance, heat intolerance, polydipsia, polyphagia and polyuria  Genitourinary: Negative for difficulty urinating  Musculoskeletal: Negative for arthralgias  Skin: Negative for color change and wound  Neurological: Negative for dizziness, syncope, speech difficulty and headaches  Hematological: Negative for adenopathy  Psychiatric/Behavioral: Negative for agitation and behavioral problems           Current Outpatient Medications   Medication Sig Dispense Refill   • amLODIPine (NORVASC) 10 mg tablet Take 1 tablet (10 mg total) by mouth daily 90 tablet 1   • carvedilol (COREG) 25 mg tablet TAKE 1 TABLET TWICE A DAY WITH MEALS 180 tablet 3   • Cholecalciferol (VITAMIN D) 2000 units tablet Take 1 tablet by mouth daily     • insulin degludec Jean-Claude Spruce FlexTouch) 200 units/mL CONCENTRATED U-200 injection pen Inject 58 Units under the skin daily 26 1 mL 1   • Insulin Pen Needle (B-D ULTRAFINE III SHORT PEN) 31G X 8 MM MISC Inject under the skin 2 (two) times a day 100 each 5   • lisinopril (ZESTRIL) 40 mg tablet TAKE 1 TABLET BY MOUTH ONCE DAILY IN THE MORNING 90 tablet 0   • metFORMIN (GLUCOPHAGE) 1000 MG tablet TAKE 1 TABLET BY MOUTH (1,000 MG TOTAL) TWICE DAILY WITH MEALS 180 tablet 1   • Omega-3 Fatty Acids (fish oil) 1,000 mg Take 2 capsules (2,000 mg total) by mouth 2 (two) times a day 60 capsule 3   • rosuvastatin (CRESTOR) 20 MG tablet TAKE 1 TABLET DAILY 90 tablet 3   • semaglutide, 1 mg/dose, (Ozempic) 4 mg/3 mL injection pen Inject 0 75 mL (1 mg total) under the skin once a week 3 mL 3   • sildenafil (VIAGRA) 100 mg tablet Take 1 tablet (100 mg total) by mouth as needed for erectile dysfunction 10 tablet 5   • spironolactone (ALDACTONE) 25 mg tablet TAKE 1 TABLET DAILY 90 tablet 3     No current facility-administered medications for this visit  Objective:    /94   Pulse 98   Temp 99 4 °F (37 4 °C)   Resp 18   Wt (!) 150 kg (330 lb)   BMI 46 03 kg/m²        Physical Exam  Vitals and nursing note reviewed  Constitutional:       General: He is not in acute distress  Appearance: He is well-developed  He is not diaphoretic  HENT:      Head: Normocephalic and atraumatic  Right Ear: External ear normal       Left Ear: External ear normal       Nose: Nose normal       Mouth/Throat:      Pharynx: No oropharyngeal exudate  Eyes:      General: No scleral icterus  Right eye: No discharge  Left eye: No discharge  Pupils: Pupils are equal, round, and reactive to light  Neck:      Thyroid: No thyromegaly  Cardiovascular:      Rate and Rhythm: Normal rate  Pulses: no weak pulses          Dorsalis pedis pulses are 2+ on the right side and 2+ on the left side  Posterior tibial pulses are 2+ on the right side and 2+ on the left side  Heart sounds: Normal heart sounds  No murmur heard  Pulmonary:      Effort: Pulmonary effort is normal  No respiratory distress  Breath sounds: Normal breath sounds  No wheezing     Abdominal:      General: Bowel sounds are normal  There is no distension  Palpations: Abdomen is soft  There is no mass  Tenderness: There is no abdominal tenderness  There is no guarding or rebound  Musculoskeletal:         General: Normal range of motion  Feet:      Right foot:      Skin integrity: No ulcer, skin breakdown, erythema, warmth, callus or dry skin  Left foot:      Skin integrity: No ulcer, skin breakdown, erythema, warmth, callus or dry skin  Skin:     General: Skin is warm and dry  Findings: No erythema or rash  Neurological:      Mental Status: He is alert  Coordination: Coordination normal       Deep Tendon Reflexes: Reflexes normal    Psychiatric:         Behavior: Behavior normal             Diabetic Foot Exam    Patient's shoes and socks removed  Right Foot/Ankle   Right Foot Inspection  Skin Exam: skin normal  Skin not intact, no dry skin, no warmth, no callus, no erythema, no maceration, no abnormal color, no pre-ulcer, no ulcer and no callus  Toe Exam: ROM and strength within normal limits  Sensory   Vibration: intact  Proprioception: intact  Monofilament testing: intact    Vascular  Capillary refills: < 3 seconds  The right DP pulse is 2+  The right PT pulse is 2+  Left Foot/Ankle  Left Foot Inspection  Skin Exam: skin normal  Skin not intact, no dry skin, no warmth, no erythema, no maceration, normal color, no pre-ulcer, no ulcer and no callus  Toe Exam: ROM and strength within normal limits  Sensory   Vibration: intact  Proprioception: intact  Monofilament testing: intact    Vascular  Capillary refills: < 3 seconds  The left DP pulse is 2+  The left PT pulse is 2+       Assign Risk Category  No deformity present  No loss of protective sensation  No weak pulses  Risk: 0    Recent Results (from the past 672 hour(s))   Microalbumin, Random Urine (W/Creatinine)    Collection Time: 05/12/23  8:15 AM   Result Value Ref Range    Creatinine, Urine 187 20 - 320 mg/dL Albumin,U,Random 9 5 See Note: mg/dL    Microalb/Creat Ratio 51 (H) <30 mcg/mg creat   Comprehensive metabolic panel    Collection Time: 05/12/23  8:15 AM   Result Value Ref Range    Glucose, Random 146 (H) 65 - 99 mg/dL    BUN 10 7 - 25 mg/dL    Creatinine 0 69 (L) 0 70 - 1 30 mg/dL    eGFR 111 > OR = 60 mL/min/1 73m2    SL AMB BUN/CREATININE RATIO 14 6 - 22 (calc)    Sodium 139 135 - 146 mmol/L    Potassium 4 4 3 5 - 5 3 mmol/L    Chloride 103 98 - 110 mmol/L    CO2 29 20 - 32 mmol/L    Calcium 9 1 8 6 - 10 3 mg/dL    Protein, Total 6 8 6 1 - 8 1 g/dL    Albumin 4 1 3 6 - 5 1 g/dL    Globulin 2 7 1 9 - 3 7 g/dL (calc)    Albumin/Globulin Ratio 1 5 1 0 - 2 5 (calc)    TOTAL BILIRUBIN 0 7 0 2 - 1 2 mg/dL    Alkaline Phosphatase 86 35 - 144 U/L    AST 19 10 - 35 U/L    ALT 30 9 - 46 U/L   CBC    Collection Time: 05/12/23  8:15 AM   Result Value Ref Range    White Blood Cell Count 8 5 3 8 - 10 8 Thousand/uL    Red Blood Cell Count 5 04 4 20 - 5 80 Million/uL    Hemoglobin 15 1 13 2 - 17 1 g/dL    HCT 44 3 38 5 - 50 0 %    MCV 87 9 80 0 - 100 0 fL    MCH 30 0 27 0 - 33 0 pg    MCHC 34 1 32 0 - 36 0 g/dL    RDW 13 7 11 0 - 15 0 %    Platelet Count 246 983 - 400 Thousand/uL    SL AMB MPV 10 5 7 5 - 12 5 fL    Always Message     Hemoglobin A1c (w/out EAG)    Collection Time: 05/12/23  8:15 AM   Result Value Ref Range    Hemoglobin A1C 8 2 (H) <5 7 % of total Hgb         Sabra Shah,   BMI Counseling: Body mass index is 46 03 kg/m²  The BMI is above normal  Nutrition recommendations include reducing portion sizes

## 2023-06-29 DIAGNOSIS — E11.42 DIABETIC POLYNEUROPATHY ASSOCIATED WITH TYPE 2 DIABETES MELLITUS (HCC): ICD-10-CM

## 2023-06-29 RX ORDER — INSULIN DEGLUDEC 200 U/ML
INJECTION, SOLUTION SUBCUTANEOUS
Qty: 27 ML | Refills: 0 | Status: SHIPPED | OUTPATIENT
Start: 2023-06-29

## 2023-06-30 DIAGNOSIS — I50.20 SYSTOLIC HEART FAILURE (HCC): ICD-10-CM

## 2023-06-30 DIAGNOSIS — I50.20 MALIGNANT HYPERTENSION WITH SYSTOLIC CHF, NYHA CLASS 2 (HCC): ICD-10-CM

## 2023-06-30 DIAGNOSIS — E11.42 DIABETIC POLYNEUROPATHY ASSOCIATED WITH TYPE 2 DIABETES MELLITUS (HCC): ICD-10-CM

## 2023-06-30 DIAGNOSIS — I11.0 MALIGNANT HYPERTENSION WITH SYSTOLIC CHF, NYHA CLASS 2 (HCC): ICD-10-CM

## 2023-06-30 DIAGNOSIS — E78.5 DYSLIPIDEMIA: ICD-10-CM

## 2023-06-30 RX ORDER — LISINOPRIL 40 MG/1
TABLET ORAL
Qty: 90 TABLET | Refills: 0 | Status: SHIPPED | OUTPATIENT
Start: 2023-06-30

## 2023-07-19 ENCOUNTER — OFFICE VISIT (OUTPATIENT)
Dept: URGENT CARE | Facility: CLINIC | Age: 53
End: 2023-07-19
Payer: COMMERCIAL

## 2023-07-19 VITALS
HEART RATE: 98 BPM | BODY MASS INDEX: 44.1 KG/M2 | SYSTOLIC BLOOD PRESSURE: 166 MMHG | DIASTOLIC BLOOD PRESSURE: 92 MMHG | TEMPERATURE: 99.5 F | RESPIRATION RATE: 20 BRPM | OXYGEN SATURATION: 99 % | HEIGHT: 71 IN | WEIGHT: 315 LBS

## 2023-07-19 DIAGNOSIS — H60.332 ACUTE SWIMMER'S EAR OF LEFT SIDE: Primary | ICD-10-CM

## 2023-07-19 PROCEDURE — 99213 OFFICE O/P EST LOW 20 MIN: CPT | Performed by: PHYSICIAN ASSISTANT

## 2023-07-19 RX ORDER — CIPROFLOXACIN AND DEXAMETHASONE 3; 1 MG/ML; MG/ML
4 SUSPENSION/ DROPS AURICULAR (OTIC) 2 TIMES DAILY
Qty: 7.5 ML | Refills: 0 | Status: SHIPPED | OUTPATIENT
Start: 2023-07-19

## 2023-07-19 NOTE — PATIENT INSTRUCTIONS
Use antibiotic eardrops as instructed for the next 5 days. Discussed avoid swimming or going underwater while using drops. Recommend earplugs to prevent future infections.

## 2023-07-19 NOTE — PROGRESS NOTES
[facility]        NAME: Sophia Cavanaugh is a 47 y.o. male  : 1970    MRN: [id number]  DATE: 2023  TIME: 1:21 PM    Assessment and Plan   Acute swimmer's ear of left side [H60.332]  1. Acute swimmer's ear of left side  ciprofloxacin-dexamethasone (CIPRODEX) otic suspension            Patient Instructions     Patient Instructions   Use antibiotic eardrops as instructed for the next 5 days. Discussed avoid swimming or going underwater while using drops. Recommend earplugs to prevent future infections. Follow up with PCP in 3-5 days. Proceed to  ER if symptoms worsen. Chief Complaint     Chief Complaint   Patient presents with   • Earache     Left ear pain with possible swimmer's ear. History of Present Illness       Patient is a 18-year-old male presenting today with left ear pain x2 days. Patient notes he has had progressively worsening pain and discomfort of his left ear canal, has been using over-the-counter swimmer's eardrops as he believed he may have developed an infection after swimming a few days ago, notes there was an occurrence where he got water in his ear. Denies fever, hearing loss, tinnitus, ear discharge or drainage, dizziness. Review of Systems   Review of Systems   Constitutional: Negative for chills and fever. HENT: Positive for ear pain. Negative for sore throat. Eyes: Negative for pain and visual disturbance. Respiratory: Negative for cough and shortness of breath. Cardiovascular: Negative for chest pain and palpitations. Gastrointestinal: Negative for abdominal pain and vomiting. Genitourinary: Negative for dysuria and hematuria. Musculoskeletal: Negative for arthralgias and back pain. Skin: Negative for color change and rash. Neurological: Negative for seizures and syncope. All other systems reviewed and are negative.         Current Medications       Current Outpatient Medications:   •  ciprofloxacin-dexamethasone (CIPRODEX) otic suspension, Administer 4 drops into the left ear 2 (two) times a day, Disp: 7.5 mL, Rfl: 0  •  metFORMIN (GLUCOPHAGE) 1000 MG tablet, TAKE 1 TABLET BY MOUTH (1,000 MG TOTAL) TWICE DAILY WITH MEALS, Disp: 180 tablet, Rfl: 3  •  amLODIPine (NORVASC) 10 mg tablet, Take 1 tablet (10 mg total) by mouth daily, Disp: 90 tablet, Rfl: 1  •  carvedilol (COREG) 25 mg tablet, TAKE 1 TABLET TWICE A DAY WITH MEALS, Disp: 180 tablet, Rfl: 3  •  Cholecalciferol (VITAMIN D) 2000 units tablet, Take 1 tablet by mouth daily, Disp: , Rfl:   •  Insulin Pen Needle (B-D ULTRAFINE III SHORT PEN) 31G X 8 MM MISC, Inject under the skin 2 (two) times a day, Disp: 100 each, Rfl: 5  •  lisinopril (ZESTRIL) 40 mg tablet, TAKE 1 TABLET BY MOUTH ONCE DAILY IN THE MORNING, Disp: 90 tablet, Rfl: 0  •  Omega-3 Fatty Acids (fish oil) 1,000 mg, Take 2 capsules (2,000 mg total) by mouth 2 (two) times a day, Disp: 60 capsule, Rfl: 3  •  rosuvastatin (CRESTOR) 20 MG tablet, TAKE 1 TABLET DAILY, Disp: 90 tablet, Rfl: 3  •  sildenafil (VIAGRA) 100 mg tablet, Take 1 tablet (100 mg total) by mouth as needed for erectile dysfunction, Disp: 10 tablet, Rfl: 5  •  spironolactone (ALDACTONE) 25 mg tablet, TAKE 1 TABLET DAILY, Disp: 90 tablet, Rfl: 3  •  Tresiba FlexTouch 200 units/mL CONCENTRATED U-200 injection pen, INJECT 58 UNITS SUBCUTANEOUSLY ONCE DAILY, Disp: 27 mL, Rfl: 0    Current Allergies     Allergies as of 07/19/2023   • (No Known Allergies)            The following portions of the patient's history were reviewed and updated as appropriate: allergies, current medications, past family history, past medical history, past social history, past surgical history and problem list.     Past Medical History:   Diagnosis Date   • Acute bacterial prostatitis 09/28/2005    Last Assessed: 9/28/2005    • Atrial flutter (HCC)     Last Assessed: 7/7/2017    • Cellulitis of toe of right foot     Last Assessed: 3/10/2017    • CPAP (continuous positive airway pressure) dependence    • Dermatophytosis of groin     Last Assessed: 3/21/2016    • Diabetes mellitus ([address])    • Erectile dysfunction    • Hypertension    • Hypokalemia     Last Assessed: 8/31/2015    • Obesity     Last Assessed: 11/30/2015    • Onychomycosis     Last Assessed: 2/15/2017    • Sleep apnea    • Tinea pedis of both feet     Last Assessed: 9/6/2016    • Type 2 diabetes mellitus ([address])        Past Surgical History:   Procedure Laterality Date   • CARDIAC CATHETERIZATION     • CARDIAC SURGERY  2015    flutter repair-ablation   • VASECTOMY  2005    Last Assessed: 5/28/2015        Family History   Problem Relation Age of Onset   • Arthritis Mother    • Arrhythmia Mother    • Hypertension Mother    • Anxiety disorder Father    • Arthritis Father    • Atrial fibrillation Father    • Hypertension Father    • Other Father         Impaired cognition    • Mental illness Neg Hx    • Diabetes Neg Hx    • Cancer Neg Hx    • Stroke Neg Hx          Medications have been verified. Objective   /92   Pulse (!) 113   Temp 99.5 °F (37.5 °C)   Resp 20   Ht 5' 11" (1.803 m)   Wt (!) 145 kg (320 lb)   SpO2 99%   BMI 44.63 kg/m²        Physical Exam     Physical Exam  Vitals and nursing note reviewed. Constitutional:       General: He is not in acute distress. Appearance: Normal appearance. HENT:      Head: Normocephalic. Right Ear: Tympanic membrane, ear canal and external ear normal.      Left Ear: Tympanic membrane and external ear normal.      Ears:      Comments: Moderate redness and swelling of left ear canal, lining of whitish exudate of canal, presentation consistent with swimmer's ear     Nose: Nose normal.      Mouth/Throat:      Mouth: Mucous membranes are moist.      Pharynx: Oropharynx is clear. Cardiovascular:      Rate and Rhythm: Normal rate and regular rhythm. Pulses: Normal pulses. Heart sounds: Normal heart sounds.    Pulmonary:      Effort: Pulmonary effort is normal.      Breath sounds: Normal breath sounds. Skin:     General: Skin is warm. Capillary Refill: Capillary refill takes less than 2 seconds. Neurological:      Mental Status: He is alert.

## 2023-07-26 DIAGNOSIS — N52.01 ERECTILE DYSFUNCTION DUE TO ARTERIAL INSUFFICIENCY: ICD-10-CM

## 2023-07-26 RX ORDER — SILDENAFIL 100 MG/1
100 TABLET, FILM COATED ORAL DAILY PRN
Qty: 8 TABLET | Refills: 0 | Status: SHIPPED | OUTPATIENT
Start: 2023-07-26

## 2023-08-14 DIAGNOSIS — E11.42 DIABETIC POLYNEUROPATHY ASSOCIATED WITH TYPE 2 DIABETES MELLITUS (HCC): Primary | ICD-10-CM

## 2023-08-14 RX ORDER — SEMAGLUTIDE 1.34 MG/ML
1 INJECTION, SOLUTION SUBCUTANEOUS
Qty: 3 ML | Refills: 0 | Status: SHIPPED | OUTPATIENT
Start: 2023-08-14 | End: 2023-08-18 | Stop reason: SDUPTHER

## 2023-08-14 RX ORDER — SEMAGLUTIDE 1.34 MG/ML
INJECTION, SOLUTION SUBCUTANEOUS
COMMUNITY
Start: 2023-07-13 | End: 2023-08-14 | Stop reason: SDUPTHER

## 2023-08-18 DIAGNOSIS — E11.42 DIABETIC POLYNEUROPATHY ASSOCIATED WITH TYPE 2 DIABETES MELLITUS (HCC): ICD-10-CM

## 2023-08-18 RX ORDER — SEMAGLUTIDE 1.34 MG/ML
1 INJECTION, SOLUTION SUBCUTANEOUS
Qty: 9 ML | Refills: 1 | Status: SHIPPED | OUTPATIENT
Start: 2023-08-18 | End: 2024-02-14

## 2023-08-18 NOTE — TELEPHONE ENCOUNTER
Patient called and left message on machine requesting a 90 day supply of Ozempic to be sent to [facility], [address]

## 2023-09-07 ENCOUNTER — TELEPHONE (OUTPATIENT)
Dept: FAMILY MEDICINE CLINIC | Facility: CLINIC | Age: 53
End: 2023-09-07

## 2023-09-07 NOTE — TELEPHONE ENCOUNTER
Prior Auth on Cover My Meds  Key [id number]  ID [id number] [facility] [place]  Forms completed waiting for a decision or more questions  luz

## 2023-09-16 LAB
ALBUMIN SERPL-MCNC: 4 G/DL (ref 3.6–5.1)
ALBUMIN/CREAT UR: 39 MCG/MG CREAT
ALBUMIN/GLOB SERPL: 1.4 (CALC) (ref 1–2.5)
ALP SERPL-CCNC: 87 U/L (ref 35–144)
ALT SERPL-CCNC: 23 U/L (ref 9–46)
AST SERPL-CCNC: 22 U/L (ref 10–35)
BILIRUB SERPL-MCNC: 0.7 MG/DL (ref 0.2–1.2)
BUN SERPL-MCNC: 9 MG/DL (ref 7–25)
BUN/CREAT SERPL: ABNORMAL (CALC) (ref 6–22)
CALCIUM SERPL-MCNC: 9 MG/DL (ref 8.6–10.3)
CHLORIDE SERPL-SCNC: 101 MMOL/L (ref 98–110)
CHOLEST SERPL-MCNC: 99 MG/DL
CHOLEST/HDLC SERPL: 3.4 (CALC)
CO2 SERPL-SCNC: 28 MMOL/L (ref 20–32)
CREAT SERPL-MCNC: 0.76 MG/DL (ref 0.7–1.3)
CREAT UR-MCNC: 358 MG/DL (ref 20–320)
GFR/BSA.PRED SERPLBLD CYS-BASED-ARV: 107 ML/MIN/1.73M2
GLOBULIN SER CALC-MCNC: 2.9 G/DL (CALC) (ref 1.9–3.7)
GLUCOSE SERPL-MCNC: 144 MG/DL (ref 65–99)
HBA1C MFR BLD: 8.3 % OF TOTAL HGB
HDLC SERPL-MCNC: 29 MG/DL
LDLC SERPL CALC-MCNC: 48 MG/DL (CALC)
MICROALBUMIN UR-MCNC: 14 MG/DL
NONHDLC SERPL-MCNC: 70 MG/DL (CALC)
POTASSIUM SERPL-SCNC: 4 MMOL/L (ref 3.5–5.3)
PROT SERPL-MCNC: 6.9 G/DL (ref 6.1–8.1)
PSA SERPL-MCNC: 0.52 NG/ML
SODIUM SERPL-SCNC: 138 MMOL/L (ref 135–146)
TRIGL SERPL-MCNC: 139 MG/DL

## 2023-09-20 ENCOUNTER — OFFICE VISIT (OUTPATIENT)
Dept: FAMILY MEDICINE CLINIC | Facility: CLINIC | Age: 53
End: 2023-09-20
Payer: COMMERCIAL

## 2023-09-20 VITALS
HEIGHT: 70 IN | BODY MASS INDEX: 45.1 KG/M2 | HEART RATE: 92 BPM | SYSTOLIC BLOOD PRESSURE: 158 MMHG | WEIGHT: 315 LBS | TEMPERATURE: 99.1 F | RESPIRATION RATE: 18 BRPM | DIASTOLIC BLOOD PRESSURE: 98 MMHG

## 2023-09-20 DIAGNOSIS — I10 BENIGN ESSENTIAL HYPERTENSION: ICD-10-CM

## 2023-09-20 DIAGNOSIS — E11.42 DIABETIC POLYNEUROPATHY ASSOCIATED WITH TYPE 2 DIABETES MELLITUS (HCC): ICD-10-CM

## 2023-09-20 DIAGNOSIS — Z23 NEED FOR VACCINATION: ICD-10-CM

## 2023-09-20 DIAGNOSIS — Z00.00 WELL ADULT EXAM: Primary | ICD-10-CM

## 2023-09-20 DIAGNOSIS — E78.2 MIXED HYPERLIPIDEMIA: ICD-10-CM

## 2023-09-20 PROCEDURE — 90471 IMMUNIZATION ADMIN: CPT

## 2023-09-20 PROCEDURE — 90686 IIV4 VACC NO PRSV 0.5 ML IM: CPT

## 2023-09-20 PROCEDURE — 99396 PREV VISIT EST AGE 40-64: CPT | Performed by: FAMILY MEDICINE

## 2023-09-20 RX ORDER — GLIPIZIDE 2.5 MG/1
2.5 TABLET, EXTENDED RELEASE ORAL DAILY
Qty: 90 TABLET | Refills: 3 | Status: SHIPPED | OUTPATIENT
Start: 2023-09-20 | End: 2024-09-14

## 2023-09-20 RX ORDER — CLONIDINE HYDROCHLORIDE 0.1 MG/1
0.1 TABLET ORAL 2 TIMES DAILY
Qty: 180 TABLET | Refills: 3 | Status: SHIPPED | OUTPATIENT
Start: 2023-09-20

## 2023-09-20 NOTE — PROGRESS NOTES
FAMILY PRACTICE HEALTH MAINTENANCE OFFICE VISIT  [facility]    NAME: Leila Andres  AGE: 46 y.o. SEX: male  : 1970     DATE: 2023    Assessment and Plan     1. Well adult exam    2. Diabetic polyneuropathy associated with type 2 diabetes mellitus ([address] [facility])  -     Ambulatory Referral to Endocrinology; Future  -     glipiZIDE (GLUCOTROL XL) 2.5 mg 24 hr tablet; Take 1 tablet (2.5 mg total) by mouth daily  -     Albumin / creatinine urine ratio; Future; Expected date: 2023  -     Comprehensive metabolic panel; Future; Expected date: 2023  -     Hemoglobin A1C; Future; Expected date: 2023    3. Benign essential hypertension  -     cloNIDine (CATAPRES) 0.1 mg tablet; Take 1 tablet (0.1 mg total) by mouth 2 (two) times a day    4. Mixed hyperlipidemia    5. Need for vaccination  -     influenza vaccine, quadrivalent, 0.5 mL, preservative-free        Patient Counseling:   Nutrition: Stressed importance of a well balanced diet, moderation of sodium/saturated fat, caloric balance and sufficient intake of fiber  Exercise: Stressed the importance of regular exercise with a goal of 150 minutes per week  Dental Health: Discussed daily flossing and brushing and regular dental visits     Immunizations reviewed: See Orders  Discussed benefits of:  Colon Cancer Screening, Prostate Cancer Screening  and Screening labs. BMI Counseling: Body mass index is 46.78 kg/m². Discussed with patient's BMI with him. The BMI is above normal. Nutrition recommendations include reducing portion sizes. Return in 3 months (on 2023) for Diabetes Follow-up.         Chief Complaint     Chief Complaint   Patient presents with   • Physical Exam     Sas/cma       History of Present Illness     Pt is here for a full physical    Pt has been out of the fish oil for two or three weeks  Pt is also out of the aldactone for a week or so    Pt has not been dieting      Well Adult Physical   Patient here for a comprehensive physical exam.      Diet and Physical Activity  Diet: low carbohydrate diet  Exercise: occasionally      Depression Screen  PHQ-2/9 Depression Screening    Little interest or pleasure in doing things: 0 - not at all  Feeling down, depressed, or hopeless: 0 - not at all  PHQ-2 Score: 0  PHQ-2 Interpretation: Negative depression screen          General Health  Hearing: Normal:  bilateral  Vision: wears glasses  Dental: regular dental visits    Reproductive Health  No issues       The following portions of the patient's history were reviewed and updated as appropriate: allergies, current medications, past family history, past medical history, past social history, past surgical history and problem list.    Review of Systems     Review of Systems   Constitutional: Negative for activity change, appetite change, chills, diaphoresis, fatigue, fever and unexpected weight change. HENT: Negative for congestion, dental problem, ear pain, mouth sores, sinus pressure, sinus pain, sore throat and trouble swallowing. Eyes: Negative for photophobia, discharge and itching. Respiratory: Negative for apnea, chest tightness and shortness of breath. Cardiovascular: Negative for chest pain, palpitations and leg swelling. Gastrointestinal: Negative for abdominal distention, abdominal pain, blood in stool, nausea and vomiting. Endocrine: Negative for cold intolerance, heat intolerance, polydipsia, polyphagia and polyuria. Genitourinary: Negative for difficulty urinating. Musculoskeletal: Negative for arthralgias. Skin: Negative for color change and wound. Neurological: Negative for dizziness, syncope, speech difficulty and headaches. Hematological: Negative for adenopathy. Psychiatric/Behavioral: Negative for agitation and behavioral problems.        Past Medical History     Past Medical History:   Diagnosis Date   • Acute bacterial prostatitis 09/28/2005    Last Assessed: 2005    • Atrial flutter (HCC)     Last Assessed: 2017    • Cellulitis of toe of right foot     Last Assessed: 3/10/2017    • CPAP (continuous positive airway pressure) dependence    • Dermatophytosis of groin     Last Assessed: 3/21/2016    • Diabetes mellitus ([address])    • Erectile dysfunction    • Hypertension    • Hypokalemia     Last Assessed: 2015    • Obesity     Last Assessed: 2015    • Onychomycosis     Last Assessed: 2/15/2017    • Sleep apnea    • Tinea pedis of both feet     Last Assessed: 2016    • Type 2 diabetes mellitus ([address])        Past Surgical History     Past Surgical History:   Procedure Laterality Date   • CARDIAC CATHETERIZATION     • CARDIAC SURGERY      flutter repair-ablation   • VASECTOMY      Last Assessed: 2015        Social History     Social History     Socioeconomic History   • Marital status: /Civil Union     Spouse name: None   • Number of children: None   • Years of education: None   • Highest education level: None   Occupational History   • None   Tobacco Use   • Smoking status: Former     Packs/day: 1.00     Years: 11.00     Total pack years: 11.00     Types: Cigarettes     Start date: 6/15/1985     Quit date: 6/15/2015     Years since quittin.2   • Smokeless tobacco: Never   Vaping Use   • Vaping Use: Never used   Substance and Sexual Activity   • Alcohol use: Yes     Comment: 1 drink/month- occasional alcohol use    • Drug use: No   • Sexual activity: None   Other Topics Concern   • None   Social History Narrative    Sleeps 6-7 hours a day     Exercise: Walking      Social Determinants of Health     Financial Resource Strain: Not on file   Food Insecurity: Not on file   Transportation Needs: Not on file   Physical Activity: Not on file   Stress: Not on file   Social Connections: Not on file   Intimate Partner Violence: Not on file   Housing Stability: Not on file       Family History     Family History   Problem Relation Age of Onset • Arthritis Mother    • Arrhythmia Mother    • Hypertension Mother    • Anxiety disorder Father    • Arthritis Father    • Atrial fibrillation Father    • Hypertension Father    • Other Father         Impaired cognition    • Mental illness Neg Hx    • Diabetes Neg Hx    • Cancer Neg Hx    • Stroke Neg Hx        Current Medications       Current Outpatient Medications:   •  amLODIPine (NORVASC) 10 mg tablet, Take 1 tablet (10 mg total) by mouth daily, Disp: 90 tablet, Rfl: 1  •  carvedilol (COREG) 25 mg tablet, TAKE 1 TABLET TWICE A DAY WITH MEALS, Disp: 180 tablet, Rfl: 3  •  Cholecalciferol (VITAMIN D) 2000 units tablet, Take 1 tablet by mouth daily, Disp: , Rfl:   •  cloNIDine (CATAPRES) 0.1 mg tablet, Take 1 tablet (0.1 mg total) by mouth 2 (two) times a day, Disp: 180 tablet, Rfl: 3  •  glipiZIDE (GLUCOTROL XL) 2.5 mg 24 hr tablet, Take 1 tablet (2.5 mg total) by mouth daily, Disp: 90 tablet, Rfl: 3  •  Insulin Pen Needle (B-D ULTRAFINE III SHORT PEN) 31G X 8 MM MISC, Inject under the skin 2 (two) times a day, Disp: 100 each, Rfl: 5  •  lisinopril (ZESTRIL) 40 mg tablet, TAKE 1 TABLET BY MOUTH ONCE DAILY IN THE MORNING, Disp: 90 tablet, Rfl: 0  •  metFORMIN (GLUCOPHAGE) 1000 MG tablet, TAKE 1 TABLET BY MOUTH (1,000 MG TOTAL) TWICE DAILY WITH MEALS, Disp: 180 tablet, Rfl: 3  •  Ozempic, 1 MG/DOSE, 4 MG/3ML injection pen, Inject 0.75 mL (1 mg total) under the skin every 7 days, Disp: 9 mL, Rfl: 1  •  rosuvastatin (CRESTOR) 20 MG tablet, TAKE 1 TABLET DAILY, Disp: 90 tablet, Rfl: 3  •  sildenafil (VIAGRA) 100 mg tablet, TAKE 1 TABLET BY MOUTH AS NEEDED FOR ERECTILE DYSFUNCTION, Disp: 8 tablet, Rfl: 0  •  spironolactone (ALDACTONE) 25 mg tablet, TAKE 1 TABLET DAILY, Disp: 90 tablet, Rfl: 3  •  Tresiba FlexTouch 200 units/mL CONCENTRATED U-200 injection pen, INJECT 58 UNITS SUBCUTANEOUSLY ONCE DAILY, Disp: 27 mL, Rfl: 0  •  Omega-3 Fatty Acids (fish oil) 1,000 mg, Take 2 capsules (2,000 mg total) by mouth 2 (two) times a day (Patient not taking: Reported on 9/20/2023), Disp: 60 capsule, Rfl: 3     Allergies     No Known Allergies    Objective     /98   Pulse 92   Temp 99.1 °F (37.3 °C)   Resp 18   Ht 5' 10" (1.778 m)   Wt (!) 148 kg (326 lb)   BMI 46.78 kg/m²      Physical Exam  Vitals and nursing note reviewed. Constitutional:       General: He is not in acute distress. Appearance: He is well-developed. He is not diaphoretic. HENT:      Head: Normocephalic and atraumatic. Right Ear: External ear normal.      Left Ear: External ear normal.      Nose: Nose normal.      Mouth/Throat:      Pharynx: No oropharyngeal exudate. Eyes:      General: No scleral icterus. Right eye: No discharge. Left eye: No discharge. Pupils: Pupils are equal, round, and reactive to light. Neck:      Thyroid: No thyromegaly. Cardiovascular:      Rate and Rhythm: Normal rate. Heart sounds: Normal heart sounds. No murmur heard. Pulmonary:      Effort: Pulmonary effort is normal. No respiratory distress. Breath sounds: Normal breath sounds. No wheezing. Abdominal:      General: Bowel sounds are normal. There is no distension. Palpations: Abdomen is soft. There is no mass. Tenderness: There is no abdominal tenderness. There is no guarding or rebound. Musculoskeletal:         General: Normal range of motion. Skin:     General: Skin is warm and dry. Findings: No erythema or rash. Neurological:      Mental Status: He is alert.       Coordination: Coordination normal.      Deep Tendon Reflexes: Reflexes normal.   Psychiatric:         Behavior: Behavior normal.           Vision Screening    Right eye Left eye Both eyes   Without correction      With correction 20/20 20/15 20/15       Recent Results (from the past 672 hour(s))   Lipid Panel with Direct LDL reflex    Collection Time: 09/15/23  6:54 AM   Result Value Ref Range    Total Cholesterol 99 <200 mg/dL    HDL 29 (L) > OR = 40 mg/dL    Triglycerides 139 <150 mg/dL    LDL Calculated 48 mg/dL (calc)    Chol HDLC Ratio 3.4 <5.0 (calc)    Non-HDL Cholesterol 70 <130 mg/dL (calc)   Microalbumin, Random Urine (W/Creatinine)    Collection Time: 09/15/23  6:54 AM   Result Value Ref Range    Creatinine, Urine 358 (H) 20 - 320 mg/dL    Albumin,U,Random 14.0 See Note: mg/dL    Microalb/Creat Ratio 39 (H) <30 mcg/mg creat   Comprehensive metabolic panel    Collection Time: 09/15/23  6:54 AM   Result Value Ref Range    Glucose, Random 144 (H) 65 - 99 mg/dL    BUN 9 7 - 25 mg/dL    Creatinine 0.76 0.70 - 1.30 mg/dL    eGFR 107 > OR = 60 mL/min/1.73m2    SL AMB BUN/CREATININE RATIO SEE NOTE: 6 - 22 (calc)    Sodium 138 135 - 146 mmol/L    Potassium 4.0 3.5 - 5.3 mmol/L    Chloride 101 98 - 110 mmol/L    CO2 28 20 - 32 mmol/L    Calcium 9.0 8.6 - 10.3 mg/dL    Protein, Total 6.9 6.1 - 8.1 g/dL    Albumin 4.0 3.6 - 5.1 g/dL    Globulin 2.9 1.9 - 3.7 g/dL (calc)    Albumin/Globulin Ratio 1.4 1.0 - 2.5 (calc)    TOTAL BILIRUBIN 0.7 0.2 - 1.2 mg/dL    Alkaline Phosphatase 87 35 - 144 U/L    AST 22 10 - 35 U/L    ALT 23 9 - 46 U/L   PSA, Total Screen    Collection Time: 09/15/23  6:54 AM   Result Value Ref Range    Prostate Specific Antigen Total 0.52 < OR = 4.00 ng/mL   Hemoglobin A1c (w/out EAG)    Collection Time: 09/15/23  6:54 AM   Result Value Ref Range    Hemoglobin A1C 8.3 (H) <5.7 % of total Hgb         [facility], [address]

## 2023-09-21 ENCOUNTER — TELEPHONE (OUTPATIENT)
Dept: ENDOCRINOLOGY | Facility: CLINIC | Age: 53
End: 2023-09-21

## 2023-10-16 DIAGNOSIS — I50.20 SYSTOLIC HEART FAILURE (HCC): ICD-10-CM

## 2023-10-16 DIAGNOSIS — E78.5 DYSLIPIDEMIA: ICD-10-CM

## 2023-10-16 DIAGNOSIS — I11.0 MALIGNANT HYPERTENSION WITH SYSTOLIC CHF, NYHA CLASS 2 (HCC): ICD-10-CM

## 2023-10-16 DIAGNOSIS — I50.20 MALIGNANT HYPERTENSION WITH SYSTOLIC CHF, NYHA CLASS 2 (HCC): ICD-10-CM

## 2023-10-16 DIAGNOSIS — N52.01 ERECTILE DYSFUNCTION DUE TO ARTERIAL INSUFFICIENCY: ICD-10-CM

## 2023-10-16 RX ORDER — LISINOPRIL 40 MG/1
TABLET ORAL
Qty: 90 TABLET | Refills: 1 | Status: SHIPPED | OUTPATIENT
Start: 2023-10-16

## 2023-10-16 RX ORDER — SILDENAFIL 100 MG/1
TABLET, FILM COATED ORAL
Qty: 8 TABLET | Refills: 0 | Status: SHIPPED | OUTPATIENT
Start: 2023-10-16

## 2023-10-19 ENCOUNTER — TELEPHONE (OUTPATIENT)
Dept: FAMILY MEDICINE CLINIC | Facility: CLINIC | Age: 53
End: 2023-10-19

## 2023-10-19 DIAGNOSIS — E11.42 DIABETIC POLYNEUROPATHY ASSOCIATED WITH TYPE 2 DIABETES MELLITUS (HCC): ICD-10-CM

## 2023-10-19 RX ORDER — INSULIN DEGLUDEC 200 U/ML
INJECTION, SOLUTION SUBCUTANEOUS
Qty: 27 ML | Refills: 0 | Status: SHIPPED | OUTPATIENT
Start: 2023-10-19

## 2023-10-20 NOTE — TELEPHONE ENCOUNTER
PA started and insurance is stating that PA not needed for [place] name brand as it is on Formulary. Please send new script for [place] over to pharmacy.     Thank you
Received Prior Auth request from [employer] for Degludec [id number].   Rx Reference Number [id number]    Please initiate Authorization    Thank You
Heidi

## 2023-10-23 ENCOUNTER — TELEPHONE (OUTPATIENT)
Dept: FAMILY MEDICINE CLINIC | Facility: CLINIC | Age: 53
End: 2023-10-23

## 2023-10-23 NOTE — TELEPHONE ENCOUNTER
Insurance is requiring a prior auth for degludex flexpen 200 unit injection    Please start a prior auth on suresripts.     TRX code- [id number]    Rebecca Casanova CMA

## 2023-10-27 DIAGNOSIS — E11.42 DIABETIC POLYNEUROPATHY ASSOCIATED WITH TYPE 2 DIABETES MELLITUS (HCC): ICD-10-CM

## 2023-10-30 RX ORDER — INSULIN DEGLUDEC 200 U/ML
INJECTION, SOLUTION SUBCUTANEOUS
Qty: 27 ML | Refills: 2 | Status: SHIPPED | OUTPATIENT
Start: 2023-10-30 | End: 2023-10-31

## 2023-10-31 ENCOUNTER — TELEPHONE (OUTPATIENT)
Dept: FAMILY MEDICINE CLINIC | Facility: CLINIC | Age: 53
End: 2023-10-31

## 2023-10-31 NOTE — TELEPHONE ENCOUNTER
Received fax for prior auth to be completed for the patients Degludec [id number]. Please complete PA.        Delia Underwood LPN

## 2023-11-20 DIAGNOSIS — I87.2 VENOUS INSUFFICIENCY: ICD-10-CM

## 2023-11-20 DIAGNOSIS — I10 CHRONIC HYPERTENSION: ICD-10-CM

## 2023-11-20 DIAGNOSIS — I50.20 SYSTOLIC HEART FAILURE (HCC): ICD-10-CM

## 2023-11-20 DIAGNOSIS — I10 HTN (HYPERTENSION), MALIGNANT: ICD-10-CM

## 2023-11-20 DIAGNOSIS — I50.20 MALIGNANT HYPERTENSION WITH SYSTOLIC CHF, NYHA CLASS 2 (HCC): ICD-10-CM

## 2023-11-20 DIAGNOSIS — I11.0 MALIGNANT HYPERTENSION WITH SYSTOLIC CHF, NYHA CLASS 2 (HCC): ICD-10-CM

## 2023-11-20 RX ORDER — AMLODIPINE BESYLATE 10 MG/1
10 TABLET ORAL DAILY
Qty: 90 TABLET | Refills: 0 | Status: SHIPPED | OUTPATIENT
Start: 2023-11-20

## 2023-12-12 DIAGNOSIS — E11.42 DIABETIC POLYNEUROPATHY ASSOCIATED WITH TYPE 2 DIABETES MELLITUS (HCC): ICD-10-CM

## 2023-12-12 RX ORDER — GLIPIZIDE 2.5 MG/1
2.5 TABLET, EXTENDED RELEASE ORAL DAILY
Qty: 90 TABLET | Refills: 1 | Status: SHIPPED | OUTPATIENT
Start: 2023-12-12 | End: 2024-12-06

## 2024-01-14 DIAGNOSIS — E11.42 DIABETIC POLYNEUROPATHY ASSOCIATED WITH TYPE 2 DIABETES MELLITUS (HCC): ICD-10-CM

## 2024-01-15 RX ORDER — PEN NEEDLE, DIABETIC 31 GX5/16"
NEEDLE, DISPOSABLE MISCELLANEOUS 2 TIMES DAILY
Qty: 100 EACH | Refills: 0 | Status: SHIPPED | OUTPATIENT
Start: 2024-01-15

## 2024-01-26 DIAGNOSIS — E11.42 DIABETIC POLYNEUROPATHY ASSOCIATED WITH TYPE 2 DIABETES MELLITUS (HCC): ICD-10-CM

## 2024-01-26 RX ORDER — INSULIN DEGLUDEC 200 U/ML
INJECTION, SOLUTION SUBCUTANEOUS
Qty: 27 ML | Refills: 0 | Status: SHIPPED | OUTPATIENT
Start: 2024-01-26

## 2024-01-31 DIAGNOSIS — E11.42 DIABETIC POLYNEUROPATHY ASSOCIATED WITH TYPE 2 DIABETES MELLITUS (HCC): ICD-10-CM

## 2024-02-01 RX ORDER — SEMAGLUTIDE 1.34 MG/ML
INJECTION, SOLUTION SUBCUTANEOUS
Qty: 3 ML | Refills: 1 | Status: SHIPPED | OUTPATIENT
Start: 2024-02-01

## 2024-02-09 LAB
ALBUMIN SERPL-MCNC: 4.3 G/DL (ref 3.6–5.1)
ALBUMIN/CREAT UR: 27 MCG/MG CREAT
ALBUMIN/GLOB SERPL: 1.5 (CALC) (ref 1–2.5)
ALP SERPL-CCNC: 85 U/L (ref 35–144)
ALT SERPL-CCNC: 28 U/L (ref 9–46)
AST SERPL-CCNC: 23 U/L (ref 10–35)
BILIRUB SERPL-MCNC: 0.5 MG/DL (ref 0.2–1.2)
BUN SERPL-MCNC: 10 MG/DL (ref 7–25)
BUN/CREAT SERPL: ABNORMAL (CALC) (ref 6–22)
CALCIUM SERPL-MCNC: 9.7 MG/DL (ref 8.6–10.3)
CHLORIDE SERPL-SCNC: 103 MMOL/L (ref 98–110)
CO2 SERPL-SCNC: 29 MMOL/L (ref 20–32)
CREAT SERPL-MCNC: 0.73 MG/DL (ref 0.7–1.3)
CREAT UR-MCNC: 243 MG/DL (ref 20–320)
GFR/BSA.PRED SERPLBLD CYS-BASED-ARV: 108 ML/MIN/1.73M2
GLOBULIN SER CALC-MCNC: 2.9 G/DL (CALC) (ref 1.9–3.7)
GLUCOSE SERPL-MCNC: 146 MG/DL (ref 65–99)
HBA1C MFR BLD: 7 % OF TOTAL HGB
MICROALBUMIN UR-MCNC: 6.6 MG/DL
POTASSIUM SERPL-SCNC: 4.6 MMOL/L (ref 3.5–5.3)
PROT SERPL-MCNC: 7.2 G/DL (ref 6.1–8.1)
SODIUM SERPL-SCNC: 138 MMOL/L (ref 135–146)

## 2024-02-14 ENCOUNTER — OFFICE VISIT (OUTPATIENT)
Dept: FAMILY MEDICINE CLINIC | Facility: CLINIC | Age: 54
End: 2024-02-14
Payer: COMMERCIAL

## 2024-02-14 VITALS
SYSTOLIC BLOOD PRESSURE: 140 MMHG | RESPIRATION RATE: 18 BRPM | TEMPERATURE: 98.7 F | HEART RATE: 89 BPM | BODY MASS INDEX: 47.06 KG/M2 | WEIGHT: 315 LBS | DIASTOLIC BLOOD PRESSURE: 88 MMHG

## 2024-02-14 DIAGNOSIS — E78.2 MIXED HYPERLIPIDEMIA: ICD-10-CM

## 2024-02-14 DIAGNOSIS — I10 BENIGN ESSENTIAL HYPERTENSION: ICD-10-CM

## 2024-02-14 DIAGNOSIS — E11.42 DIABETIC POLYNEUROPATHY ASSOCIATED WITH TYPE 2 DIABETES MELLITUS (HCC): Primary | ICD-10-CM

## 2024-02-14 PROCEDURE — 99214 OFFICE O/P EST MOD 30 MIN: CPT | Performed by: FAMILY MEDICINE

## 2024-02-14 RX ORDER — CLONIDINE HYDROCHLORIDE 0.2 MG/1
0.2 TABLET ORAL 2 TIMES DAILY
Qty: 180 TABLET | Refills: 1 | Status: SHIPPED | OUTPATIENT
Start: 2024-02-14 | End: 2024-08-12

## 2024-02-14 NOTE — PROGRESS NOTES
Assessment/Plan:    1. Diabetic polyneuropathy associated with type 2 diabetes mellitus (HCC)  -     Albumin / creatinine urine ratio; Future; Expected date: 06/13/2024  -     Comprehensive metabolic panel; Future; Expected date: 06/13/2024  -     Hemoglobin A1C; Future; Expected date: 06/13/2024  -     CBC and differential; Future; Expected date: 06/13/2024  -     Lipid Panel with Direct LDL reflex; Future; Expected date: 06/13/2024    2. Benign essential hypertension  -     Albumin / creatinine urine ratio; Future; Expected date: 06/13/2024  -     Comprehensive metabolic panel; Future; Expected date: 06/13/2024  -     Hemoglobin A1C; Future; Expected date: 06/13/2024  -     CBC and differential; Future; Expected date: 06/13/2024  -     Lipid Panel with Direct LDL reflex; Future; Expected date: 06/13/2024  -     cloNIDine (CATAPRES) 0.2 mg tablet; Take 1 tablet (0.2 mg total) by mouth 2 (two) times a day    3. Mixed hyperlipidemia  -     Albumin / creatinine urine ratio; Future; Expected date: 06/13/2024  -     Comprehensive metabolic panel; Future; Expected date: 06/13/2024  -     Hemoglobin A1C; Future; Expected date: 06/13/2024  -     CBC and differential; Future; Expected date: 06/13/2024  -     Lipid Panel with Direct LDL reflex; Future; Expected date: 06/13/2024            There are no Patient Instructions on file for this visit.    Return in 4 months (on 6/14/2024) for Diabetes follow-up.    Subjective:      Patient ID: Ernesto Garay is a 53 y.o. male.    Chief Complaint   Patient presents with   • Follow-up   • Diabetes     Sas/cma       Pt is here for a follow up  Pt had labs        The following portions of the patient's history were reviewed and updated as appropriate: allergies, current medications, past family history, past medical history, past social history, past surgical history and problem list.    Review of Systems   Constitutional:  Negative for activity change, appetite change, chills,  diaphoresis, fatigue, fever and unexpected weight change.   HENT:  Negative for congestion, dental problem, ear pain, mouth sores, sinus pressure, sinus pain, sore throat and trouble swallowing.    Eyes:  Negative for photophobia, discharge and itching.   Respiratory:  Negative for apnea, chest tightness and shortness of breath.    Cardiovascular:  Negative for chest pain, palpitations and leg swelling.   Gastrointestinal:  Negative for abdominal distention, abdominal pain, blood in stool, nausea and vomiting.   Endocrine: Negative for cold intolerance, heat intolerance, polydipsia, polyphagia and polyuria.   Genitourinary:  Negative for difficulty urinating.   Musculoskeletal:  Negative for arthralgias.   Skin:  Negative for color change and wound.   Neurological:  Negative for dizziness, syncope, speech difficulty and headaches.   Hematological:  Negative for adenopathy.   Psychiatric/Behavioral:  Negative for agitation and behavioral problems.          Current Outpatient Medications   Medication Sig Dispense Refill   • amLODIPine (NORVASC) 10 mg tablet Take 1 tablet by mouth once daily 90 tablet 0   • carvedilol (COREG) 25 mg tablet TAKE 1 TABLET TWICE A DAY WITH MEALS 180 tablet 3   • Cholecalciferol (VITAMIN D) 2000 units tablet Take 1 tablet by mouth daily     • cloNIDine (CATAPRES) 0.2 mg tablet Take 1 tablet (0.2 mg total) by mouth 2 (two) times a day 180 tablet 1   • glipiZIDE (GLUCOTROL XL) 2.5 mg 24 hr tablet Take 1 tablet (2.5 mg total) by mouth daily 90 tablet 1   • Insulin Degludec FlexTouch 200 UNIT/ML SOPN INJECT 58 UNITS SUBCUTANEOUSLY ONCE DAILY 27 mL 0   • Insulin Pen Needle (B-D ULTRAFINE III SHORT PEN) 31G X 8 MM MISC Inject under the skin 2 (two) times a day 100 each 0   • lisinopril (ZESTRIL) 40 mg tablet TAKE 1 TABLET BY MOUTH ONCE DAILY IN THE MORNING 90 tablet 1   • metFORMIN (GLUCOPHAGE) 1000 MG tablet TAKE 1 TABLET BY MOUTH (1,000 MG TOTAL) TWICE DAILY WITH MEALS 180 tablet 3   • Omega-3  Fatty Acids (fish oil) 1,000 mg Take 2 capsules (2,000 mg total) by mouth 2 (two) times a day 60 capsule 3   • rosuvastatin (CRESTOR) 20 MG tablet TAKE 1 TABLET DAILY 90 tablet 3   • semaglutide, 1 mg/dose, (Ozempic, 1 MG/DOSE,) 4 mg/3 mL injection pen INJECT 0.75ML (1MG TOTAL) SUBCUTANEOUSLY ONCE A WEEK 3 mL 1   • sildenafil (VIAGRA) 100 mg tablet TAKE ONE TABLET BY MOUTH AS NEEDED FOR ERECTILE DYSFUNCTION. DO NOT USE MORE THAN ONE DOSE DAILY 8 tablet 0   • spironolactone (ALDACTONE) 25 mg tablet TAKE 1 TABLET DAILY 90 tablet 3     No current facility-administered medications for this visit.       Objective:    /88   Pulse 89   Temp 98.7 °F (37.1 °C)   Resp 18   Wt (!) 149 kg (328 lb)   BMI 47.06 kg/m²        Physical Exam  Vitals and nursing note reviewed.   Constitutional:       General: He is not in acute distress.     Appearance: He is well-developed. He is not diaphoretic.   HENT:      Head: Normocephalic and atraumatic.      Right Ear: External ear normal.      Left Ear: External ear normal.      Nose: Nose normal.      Mouth/Throat:      Pharynx: No oropharyngeal exudate.   Eyes:      General: No scleral icterus.        Right eye: No discharge.         Left eye: No discharge.      Pupils: Pupils are equal, round, and reactive to light.   Neck:      Thyroid: No thyromegaly.   Cardiovascular:      Rate and Rhythm: Normal rate.      Heart sounds: Normal heart sounds. No murmur heard.  Pulmonary:      Effort: Pulmonary effort is normal. No respiratory distress.      Breath sounds: Normal breath sounds. No wheezing.   Abdominal:      General: Bowel sounds are normal. There is no distension.      Palpations: Abdomen is soft. There is no mass.      Tenderness: There is no abdominal tenderness. There is no guarding or rebound.   Musculoskeletal:         General: Normal range of motion.   Skin:     General: Skin is warm and dry.      Findings: No erythema or rash.   Neurological:      Mental Status: He is  alert.      Coordination: Coordination normal.      Deep Tendon Reflexes: Reflexes normal.   Psychiatric:         Behavior: Behavior normal.                Frank Lombardi, DO

## 2024-02-19 ENCOUNTER — TELEPHONE (OUTPATIENT)
Age: 54
End: 2024-02-19

## 2024-02-19 DIAGNOSIS — I10 CHRONIC HYPERTENSION: ICD-10-CM

## 2024-02-19 DIAGNOSIS — I87.2 VENOUS INSUFFICIENCY: ICD-10-CM

## 2024-02-19 DIAGNOSIS — I50.20 MALIGNANT HYPERTENSION WITH SYSTOLIC CHF, NYHA CLASS 2 (HCC): ICD-10-CM

## 2024-02-19 DIAGNOSIS — I50.20 SYSTOLIC HEART FAILURE (HCC): ICD-10-CM

## 2024-02-19 DIAGNOSIS — I11.0 MALIGNANT HYPERTENSION WITH SYSTOLIC CHF, NYHA CLASS 2 (HCC): ICD-10-CM

## 2024-02-19 DIAGNOSIS — I10 HTN (HYPERTENSION), MALIGNANT: ICD-10-CM

## 2024-02-19 RX ORDER — AMLODIPINE BESYLATE 10 MG/1
10 TABLET ORAL DAILY
Qty: 90 TABLET | Refills: 1 | Status: SHIPPED | OUTPATIENT
Start: 2024-02-19

## 2024-03-02 LAB
LEFT EYE DIABETIC RETINOPATHY: NORMAL
RIGHT EYE DIABETIC RETINOPATHY: NORMAL

## 2024-03-18 DIAGNOSIS — N52.01 ERECTILE DYSFUNCTION DUE TO ARTERIAL INSUFFICIENCY: ICD-10-CM

## 2024-03-18 DIAGNOSIS — I50.20 SYSTOLIC HEART FAILURE (HCC): ICD-10-CM

## 2024-03-19 RX ORDER — SPIRONOLACTONE 25 MG/1
TABLET ORAL
Qty: 90 TABLET | Refills: 0 | Status: SHIPPED | OUTPATIENT
Start: 2024-03-19

## 2024-03-19 RX ORDER — SILDENAFIL 100 MG/1
TABLET, FILM COATED ORAL
Qty: 8 TABLET | Refills: 5 | Status: SHIPPED | OUTPATIENT
Start: 2024-03-19

## 2024-04-17 DIAGNOSIS — I11.0 MALIGNANT HYPERTENSION WITH SYSTOLIC CHF, NYHA CLASS 2 (HCC): ICD-10-CM

## 2024-04-17 DIAGNOSIS — I50.20 MALIGNANT HYPERTENSION WITH SYSTOLIC CHF, NYHA CLASS 2 (HCC): ICD-10-CM

## 2024-04-17 DIAGNOSIS — E78.5 DYSLIPIDEMIA: ICD-10-CM

## 2024-04-17 DIAGNOSIS — I50.20 SYSTOLIC HEART FAILURE (HCC): ICD-10-CM

## 2024-04-17 RX ORDER — LISINOPRIL 40 MG/1
TABLET ORAL
Qty: 90 TABLET | Refills: 1 | Status: SHIPPED | OUTPATIENT
Start: 2024-04-17

## 2024-04-18 RX ORDER — ROSUVASTATIN CALCIUM 20 MG/1
TABLET, COATED ORAL
Qty: 90 TABLET | Refills: 0 | Status: SHIPPED | OUTPATIENT
Start: 2024-04-18

## 2024-04-26 DIAGNOSIS — E11.42 DIABETIC POLYNEUROPATHY ASSOCIATED WITH TYPE 2 DIABETES MELLITUS (HCC): ICD-10-CM

## 2024-04-27 RX ORDER — SEMAGLUTIDE 1.34 MG/ML
INJECTION, SOLUTION SUBCUTANEOUS
Qty: 3 ML | Refills: 5 | Status: SHIPPED | OUTPATIENT
Start: 2024-04-27

## 2024-05-03 DIAGNOSIS — E11.42 DIABETIC POLYNEUROPATHY ASSOCIATED WITH TYPE 2 DIABETES MELLITUS (HCC): ICD-10-CM

## 2024-05-05 RX ORDER — INSULIN DEGLUDEC 200 U/ML
INJECTION, SOLUTION SUBCUTANEOUS
Qty: 27 ML | Refills: 0 | Status: SHIPPED | OUTPATIENT
Start: 2024-05-05

## 2024-05-09 ENCOUNTER — OFFICE VISIT (OUTPATIENT)
Dept: CARDIOLOGY CLINIC | Facility: CLINIC | Age: 54
End: 2024-05-09
Payer: COMMERCIAL

## 2024-05-09 VITALS
OXYGEN SATURATION: 98 % | HEART RATE: 83 BPM | SYSTOLIC BLOOD PRESSURE: 118 MMHG | DIASTOLIC BLOOD PRESSURE: 78 MMHG | WEIGHT: 315 LBS | HEIGHT: 70 IN | BODY MASS INDEX: 45.1 KG/M2

## 2024-05-09 DIAGNOSIS — I10 HTN (HYPERTENSION), MALIGNANT: Primary | ICD-10-CM

## 2024-05-09 DIAGNOSIS — I11.0 MALIGNANT HYPERTENSION WITH SYSTOLIC CHF, NYHA CLASS 2 (HCC): ICD-10-CM

## 2024-05-09 DIAGNOSIS — I50.20 SYSTOLIC HEART FAILURE (HCC): ICD-10-CM

## 2024-05-09 DIAGNOSIS — I50.20 MALIGNANT HYPERTENSION WITH SYSTOLIC CHF, NYHA CLASS 2 (HCC): ICD-10-CM

## 2024-05-09 DIAGNOSIS — E78.5 DYSLIPIDEMIA: ICD-10-CM

## 2024-05-09 PROCEDURE — 99214 OFFICE O/P EST MOD 30 MIN: CPT | Performed by: INTERNAL MEDICINE

## 2024-05-09 PROCEDURE — 93000 ELECTROCARDIOGRAM COMPLETE: CPT | Performed by: INTERNAL MEDICINE

## 2024-05-09 RX ORDER — SPIRONOLACTONE 25 MG/1
25 TABLET ORAL
Qty: 90 TABLET | Refills: 3 | Status: SHIPPED | OUTPATIENT
Start: 2024-05-09

## 2024-05-09 RX ORDER — ROSUVASTATIN CALCIUM 20 MG/1
20 TABLET, COATED ORAL DAILY
Qty: 90 TABLET | Refills: 3 | Status: SHIPPED | OUTPATIENT
Start: 2024-05-09

## 2024-05-09 RX ORDER — CARVEDILOL 25 MG/1
25 TABLET ORAL 2 TIMES DAILY WITH MEALS
Qty: 180 TABLET | Refills: 3 | Status: SHIPPED | OUTPATIENT
Start: 2024-05-09

## 2024-05-09 NOTE — PROGRESS NOTES
Cardiology Follow Up  Ernesto Garay  1970  [id number]  [facility]  [address]  [address]  [facility] [place] [address] [phone]    1. HTN (hypertension), malignant  carvedilol (COREG) 25 mg tablet      2. Malignant hypertension with systolic CHF, NYHA class 2 (HCC)  POCT ECG      3. Dyslipidemia  rosuvastatin (CRESTOR) 20 MG tablet      4. Systolic heart failure (HCC)  spironolactone (ALDACTONE) 25 mg tablet         Discussion/Plan:  Hld- triglycerides are imroved. Hx of diabetes mellitus  -- Crestor 20 mg + coq10.  We discussed compliance     Nonischemic HF- NYHA class 2 on exam. Euvolemia. EF improved to 50-55%. bp optimized  -- salt restriction  -- carvedilol 25mg bid  -- lisinopril 40mg  -- aldosterone 25mg daily  -- at least twice a year electrolytes    Inferior q wave position  -- unchanged    Morbid obesity- BMI 41. Weight is trending up  -- back with medical weight loss    Malignant Htn- controlled-- carvedillol 25mg-- enalapril + aldosterone-+ amlodipine 10 mg - low salt dietA    trial flutter- typical. s/p ablation. sotalol d/c. Resolved    DM2- controlled    KYA- compliant with CPAP. Discussed wt loss    Rtc- one year      Interval History:  43 yo hx nonischemic heart failure with recovery of EF from 35-55%, obesity, dm2 on metformin, typical atrial flutter s/p successful ablation presents for follow-up. No symptoms of heart failure. No exercising much. Has had some weight gain. Diet is not optimized. Taking his medications including cholesterol.4/5:He has had no palpitations, shortness of breath, lower from edema. He is trying an exercise program. He is trying to improve his diet. We discussed in detail his advanced lipid panel. All his questions were answered. 7/10: Denies having any return of palpitations. No dizziness or light-headness. HAs had trouble with his weights. Likes carbs.  Triglycerides are not at goal. Compliant with meds. no chest pain.     08/20/2018:  He denies having any recurrence of arrhythmias.  He is compliant with his medications.  He has not yet started structured exercise program.  However he is starting with his wife.  He denies having dizziness or lightheadedness.  He denies having any lower extremity edema.    10/01/2019:  He denies having chest discomfort.  Denies having lower extremity edema.  He denies feeling dizziness or lightheadedness.  He is compliant with medications.  He has had some forgetfulness with taking his Omega threes.  We reviewed his last lipid panel.    10/01/2020:  He had lost weight with a weight loss clinic.  Unfortunately with COVID and due to significant family stressors he is put back weight.  He is compliant with his CPAP at night.  He denies having chest heaviness.  He denies having palpitations.  We reviewed through his last lipid panel.    01/07/2022:  He has not been as compliant with his medications.  He reports having a recent COVID infection.  He still feels some fatigue.  He still feels some shortness of breath.  He denies having lower extremity swelling.    Recent Visit: Denies having chest pain or major change in breathing. Denies significant palpitations. Compliant with therapy. Reviewed labwork together. Not exercising regularly- we discussed about compliance        Patient Active Problem List   Diagnosis    Diabetic polyneuropathy associated with type 2 diabetes mellitus (HCC)    Status post catheter ablation of atrial flutter    Benign essential hypertension    Mixed hyperlipidemia    Malignant hypertension with systolic CHF, NYHA class 2 (HCC)    Venous insufficiency (chronic) (peripheral)    Sleep apnea    Class 3 severe obesity in adult (HCC)    Erectile dysfunction due to arterial insufficiency     Past Medical History:   Diagnosis Date    Acute bacterial prostatitis 09/28/2005    Last Assessed: 9/28/2005     Atrial flutter  (Formerly [facility])     Last Assessed: 2017     Cellulitis of toe of right foot     Last Assessed: 3/10/2017     CPAP (continuous positive airway pressure) dependence     Dermatophytosis of groin     Last Assessed: 3/21/2016     Diabetes mellitus (Former[facility])     Erectile dysfunction     Hypertension     Hypokalemia     Last Assessed: 2015     Obesity     Last Assessed: 2015     Onychomycosis     Last Assessed: 2/15/2017     Sleep apnea     Tinea pedis of both feet     Last Assessed: 2016     Type 2 diabetes mellitus (Former[facility])      Social History     Socioeconomic History    Marital status: /Civil Union     Spouse name: Not on file    Number of children: Not on file    Years of education: Not on file    Highest education level: Not on file   Occupational History    Not on file   Tobacco Use    Smoking status: Former     Current packs/day: 0.00     Average packs/day: 1 pack/day for 30.0 years (30.0 ttl pk-yrs)     Types: Cigarettes     Start date: 6/15/1985     Quit date: 6/15/2015     Years since quittin.9    Smokeless tobacco: Never   Vaping Use    Vaping status: Never Used   Substance and Sexual Activity    Alcohol use: Yes     Comment: 1 drink/month- occasional alcohol use     Drug use: No    Sexual activity: Not on file   Other Topics Concern    Not on file   Social History Narrative    Sleeps 6-7 hours a day     Exercise: Walking      Social Determinants of Health     Financial Resource Strain: Not on file   Food Insecurity: Not on file   Transportation Needs: Not on file   Physical Activity: Not on file   Stress: Not on file   Social Connections: Not on file   Intimate Partner Violence: Not on file   Housing Stability: Not on file      Family History   Problem Relation Age of Onset    Arthritis Mother     Arrhythmia Mother     Hypertension Mother     Anxiety disorder Father     Arthritis Father     Atrial fibrillation Father     Hypertension Father     Other Father         Impaired cognition     Mental  illness Neg Hx     Diabetes Neg Hx     Cancer Neg Hx     Stroke Neg Hx      Past Surgical History:   Procedure Laterality Date    CARDIAC CATHETERIZATION      CARDIAC SURGERY  2015    flutter repair-ablation    VASECTOMY  2005    Last Assessed: 5/28/2015        Current Outpatient Medications:     amLODIPine (NORVASC) 10 mg tablet, Take 1 tablet by mouth once daily, Disp: 90 tablet, Rfl: 1    carvedilol (COREG) 25 mg tablet, TAKE 1 TABLET TWICE A DAY WITH MEALS, Disp: 180 tablet, Rfl: 3    Cholecalciferol (VITAMIN D) 2000 units tablet, Take 1 tablet by mouth daily, Disp: , Rfl:     cloNIDine (CATAPRES) 0.2 mg tablet, Take 1 tablet (0.2 mg total) by mouth 2 (two) times a day, Disp: 180 tablet, Rfl: 1    glipiZIDE (GLUCOTROL XL) 2.5 mg 24 hr tablet, Take 1 tablet (2.5 mg total) by mouth daily, Disp: 90 tablet, Rfl: 1    Insulin Degludec FlexTouch 200 UNIT/ML SOPN, INJECT 58 UNITS SUBCUTANEOUSLY ONCE DAILY, Disp: 27 mL, Rfl: 0    Insulin Pen Needle (B-D ULTRAFINE III SHORT PEN) 31G X 8 MM MISC, Inject under the skin 2 (two) times a day, Disp: 100 each, Rfl: 0    lisinopril (ZESTRIL) 40 mg tablet, TAKE 1 TABLET BY MOUTH ONCE DAILY IN THE MORNING, Disp: 90 tablet, Rfl: 1    metFORMIN (GLUCOPHAGE) 1000 MG tablet, TAKE 1 TABLET BY MOUTH (1,000 MG TOTAL) TWICE DAILY WITH MEALS, Disp: 180 tablet, Rfl: 3    Omega-3 Fatty Acids (fish oil) 1,000 mg, Take 2 capsules (2,000 mg total) by mouth 2 (two) times a day, Disp: 60 capsule, Rfl: 3    rosuvastatin (CRESTOR) 20 MG tablet, TAKE 1 TABLET DAILY, Disp: 90 tablet, Rfl: 0    semaglutide, 1 mg/dose, (Ozempic, 1 MG/DOSE,) 4 mg/3 mL injection pen, INJECT 0.75 ML (1MG) SUBCUTANEOUSLY  ONCE A WEEK, Disp: 3 mL, Rfl: 5    sildenafil (VIAGRA) 100 mg tablet, TAKE 1 TABLET BY MOUTH AS NEEDED FOR ERECTILE DYSFUNCTION. DO NOT USE MORE THAN ONE DOSE DAILY, Disp: 8 tablet, Rfl: 5    spironolactone (ALDACTONE) 25 mg tablet, TAKE 1 TABLET DAILY, Disp: 90 tablet, Rfl: 0  No Known  "Allergies    Review of Systems:  Review of Systems   Constitutional:  Positive for fatigue.   HENT: Negative.     Eyes: Negative.    Respiratory:  Positive for shortness of breath.    Cardiovascular: Negative.    Gastrointestinal: Negative.    Endocrine: Negative.    Genitourinary: Negative.    Musculoskeletal: Negative.    Skin: Negative.    Allergic/Immunologic: Negative.    Neurological: Negative.    Hematological: Negative.    Psychiatric/Behavioral: Negative.         Vitals:    05/09/24 1631   BP: 118/78   BP Location: Right arm   Patient Position: Sitting   Cuff Size: Large   Pulse: 83   SpO2: 98%   Weight: (!) 147 kg (324 lb)   Height: 5' 10\" (1.778 m)     Physical Exam:  Physical Exam  Constitutional:       General: He is not in acute distress.     Appearance: He is well-developed. He is ill-appearing. He is not diaphoretic.   HENT:      Head: Normocephalic and atraumatic.      Right Ear: External ear normal.      Left Ear: External ear normal.   Eyes:      General: No scleral icterus.        Right eye: No discharge.         Left eye: No discharge.      Conjunctiva/sclera: Conjunctivae normal.      Pupils: Pupils are equal, round, and reactive to light.   Neck:      Thyroid: No thyromegaly.      Vascular: No JVD.      Trachea: No tracheal deviation.   Cardiovascular:      Rate and Rhythm: Normal rate and regular rhythm.      Heart sounds: Murmur heard.      No friction rub. Gallop present.   Pulmonary:      Effort: Pulmonary effort is normal. No respiratory distress.      Breath sounds: Normal breath sounds. No stridor. No wheezing or rales.   Chest:      Chest wall: No tenderness.   Abdominal:      General: Bowel sounds are normal. There is distension.      Palpations: Abdomen is soft. There is no mass.      Tenderness: There is no abdominal tenderness. There is no guarding or rebound.   Musculoskeletal:         General: No tenderness or deformity. Normal range of motion.      Cervical back: Normal range of " motion and neck supple.   Skin:     General: Skin is warm and dry.      Coloration: Skin is not pale.      Findings: No erythema or rash.   Neurological:      Mental Status: He is alert and oriented to person, place, and time.      Cranial Nerves: No cranial nerve deficit.      Motor: No abnormal muscle tone.      Coordination: Coordination normal.      Deep Tendon Reflexes: Reflexes are normal and symmetric.   Psychiatric:         Behavior: Behavior normal.         Thought Content: Thought content normal.         Judgment: Judgment normal.         Labs:     Lab Results   Component Value Date    WBC 8.5 05/12/2023    HGB 15.1 05/12/2023    HCT 44.3 05/12/2023    MCV 87.9 05/12/2023     05/12/2023     Lab Results   Component Value Date     11/04/2017    K 4.6 02/09/2024     02/09/2024    CO2 29 02/09/2024    BUN 10 02/09/2024    CREATININE 0.73 02/09/2024    GLUCOSE 133 (H) 11/04/2017    GLUF 131 (H) 08/06/2018    CALCIUM 9.7 02/09/2024    AST 23 02/09/2024    ALT 28 02/09/2024    ALKPHOS 85 02/09/2024    PROT 7.1 11/04/2017    BILITOT 0.5 11/04/2017    EGFR 108 02/09/2024     Lab Results   Component Value Date    CHOL 115 07/06/2017    CHOL 156 10/22/2016    CHOL 121 03/16/2016     Lab Results   Component Value Date    HDL 29 (L) 09/15/2023    HDL 31 (L) 12/16/2022    HDL 35 (L) 09/09/2022     Lab Results   Component Value Date    LDLCALC 48 09/15/2023    LDLCALC 45 12/16/2022    LDLCALC 60 09/09/2022     Lab Results   Component Value Date    TRIG 139 09/15/2023    TRIG 125 12/16/2022    TRIG 146 09/09/2022     Lab Results   Component Value Date    HGBA1C 7.0 (H) 02/09/2024       Imaging & Testing   I have personally reviewed pertinent reports.      EKG: Personally reviewed.      Normal sinus rhythm  QRS 90  MO interval 150  QTC of 400  Normal sinus rhythm inferior Q-waves no acute ST T wave changes  Normal sinus rhythm inferior Q-waves no acute ST changes    Cardiac testing:   No results found  "for this or any previous visit.      Ryley Marsh MD [facility]HI[facility]us[facility]  Please call with any questions or suggestions    A description of the counseling:   Goals and Barriers:  Patient's ability to self care:  Medication side effect reviewed with patient in detail and all their questions answered.    \"This note has been constructed using a voice recognition system.Therefore there may be syntax, spelling, and/or grammatical errors. Please call if you have any questions. \"    "

## 2024-06-19 ENCOUNTER — TELEPHONE (OUTPATIENT)
Age: 54
End: 2024-06-19

## 2024-06-19 DIAGNOSIS — E11.42 DIABETIC POLYNEUROPATHY ASSOCIATED WITH TYPE 2 DIABETES MELLITUS (HCC): ICD-10-CM

## 2024-06-19 RX ORDER — GLIPIZIDE 2.5 MG/1
2.5 TABLET, EXTENDED RELEASE ORAL DAILY
Qty: 90 TABLET | Refills: 1 | Status: SHIPPED | OUTPATIENT
Start: 2024-06-19

## 2024-06-19 NOTE — TELEPHONE ENCOUNTER
Spoke with Luz from [facility] seeking clarification on Lisinopril and Spironolactone.     Checking if patient to be on both medications. Review of chart shows patient has been on both for a while.    Pharmacy concerned about potassium levels with both.    If any changes need to be made, please call her at [phone].    Please advise.

## 2024-06-22 LAB
ALBUMIN SERPL-MCNC: 4.2 G/DL (ref 3.6–5.1)
ALBUMIN/CREAT UR: 72 MG/G CREAT
ALBUMIN/GLOB SERPL: 1.4 (CALC) (ref 1–2.5)
ALP SERPL-CCNC: 98 U/L (ref 35–144)
ALT SERPL-CCNC: 28 U/L (ref 9–46)
AST SERPL-CCNC: 22 U/L (ref 10–35)
BASOPHILS # BLD AUTO: 38 CELLS/UL (ref 0–200)
BASOPHILS NFR BLD AUTO: 0.4 %
BILIRUB SERPL-MCNC: 0.5 MG/DL (ref 0.2–1.2)
BUN SERPL-MCNC: 9 MG/DL (ref 7–25)
BUN/CREAT SERPL: 14 (CALC) (ref 6–22)
CALCIUM SERPL-MCNC: 9.3 MG/DL (ref 8.6–10.3)
CHLORIDE SERPL-SCNC: 100 MMOL/L (ref 98–110)
CHOLEST SERPL-MCNC: 109 MG/DL
CHOLEST/HDLC SERPL: 3.4 (CALC)
CO2 SERPL-SCNC: 30 MMOL/L (ref 20–32)
CREAT SERPL-MCNC: 0.66 MG/DL (ref 0.7–1.3)
CREAT UR-MCNC: 124 MG/DL (ref 20–320)
EOSINOPHIL # BLD AUTO: 250 CELLS/UL (ref 15–500)
EOSINOPHIL NFR BLD AUTO: 2.6 %
ERYTHROCYTE [DISTWIDTH] IN BLOOD BY AUTOMATED COUNT: 13.4 % (ref 11–15)
GFR/BSA.PRED SERPLBLD CYS-BASED-ARV: 111 ML/MIN/1.73M2
GLOBULIN SER CALC-MCNC: 3 G/DL (CALC) (ref 1.9–3.7)
GLUCOSE SERPL-MCNC: 138 MG/DL (ref 65–99)
HBA1C MFR BLD: 7.3 % OF TOTAL HGB
HCT VFR BLD AUTO: 46.5 % (ref 38.5–50)
HDLC SERPL-MCNC: 32 MG/DL
HGB BLD-MCNC: 15.4 G/DL (ref 13.2–17.1)
LDLC SERPL CALC-MCNC: 56 MG/DL (CALC)
LYMPHOCYTES # BLD AUTO: 2054 CELLS/UL (ref 850–3900)
LYMPHOCYTES NFR BLD AUTO: 21.4 %
MCH RBC QN AUTO: 29.3 PG (ref 27–33)
MCHC RBC AUTO-ENTMCNC: 33.1 G/DL (ref 32–36)
MCV RBC AUTO: 88.6 FL (ref 80–100)
MICROALBUMIN UR-MCNC: 8.9 MG/DL
MONOCYTES # BLD AUTO: 557 CELLS/UL (ref 200–950)
MONOCYTES NFR BLD AUTO: 5.8 %
NEUTROPHILS # BLD AUTO: 6701 CELLS/UL (ref 1500–7800)
NEUTROPHILS NFR BLD AUTO: 69.8 %
NONHDLC SERPL-MCNC: 77 MG/DL (CALC)
PLATELET # BLD AUTO: 226 THOUSAND/UL (ref 140–400)
PMV BLD REES-ECKER: 10.2 FL (ref 7.5–12.5)
POTASSIUM SERPL-SCNC: 4.4 MMOL/L (ref 3.5–5.3)
PROT SERPL-MCNC: 7.2 G/DL (ref 6.1–8.1)
RBC # BLD AUTO: 5.25 MILLION/UL (ref 4.2–5.8)
SODIUM SERPL-SCNC: 137 MMOL/L (ref 135–146)
TRIGL SERPL-MCNC: 130 MG/DL
WBC # BLD AUTO: 9.6 THOUSAND/UL (ref 3.8–10.8)

## 2024-06-25 ENCOUNTER — OFFICE VISIT (OUTPATIENT)
Dept: FAMILY MEDICINE CLINIC | Facility: CLINIC | Age: 54
End: 2024-06-25
Payer: COMMERCIAL

## 2024-06-25 VITALS
DIASTOLIC BLOOD PRESSURE: 100 MMHG | RESPIRATION RATE: 18 BRPM | SYSTOLIC BLOOD PRESSURE: 148 MMHG | TEMPERATURE: 98.7 F | HEART RATE: 90 BPM | WEIGHT: 315 LBS | BODY MASS INDEX: 46.63 KG/M2

## 2024-06-25 DIAGNOSIS — E11.42 DIABETIC POLYNEUROPATHY ASSOCIATED WITH TYPE 2 DIABETES MELLITUS (HCC): ICD-10-CM

## 2024-06-25 DIAGNOSIS — E78.2 MIXED HYPERLIPIDEMIA: ICD-10-CM

## 2024-06-25 DIAGNOSIS — I10 BENIGN ESSENTIAL HYPERTENSION: Primary | ICD-10-CM

## 2024-06-25 PROCEDURE — 99214 OFFICE O/P EST MOD 30 MIN: CPT | Performed by: FAMILY MEDICINE

## 2024-06-25 NOTE — TELEPHONE ENCOUNTER
I called [facility] and spoke with pharmacist, permission granted to fill both medications as doctor is aware.

## 2024-06-25 NOTE — PROGRESS NOTES
Assessment/Plan:    1. Benign essential hypertension  Assessment & Plan:  Pt did miss his BP meds yesterday  Sees Jenaro and his BP was good a montgh ago  Also had a cold and is on OTC meds  Orders:  -     Albumin / creatinine urine ratio; Future; Expected date: 09/25/2024  -     Comprehensive metabolic panel; Future; Expected date: 09/25/2024  -     Hemoglobin A1C; Future; Expected date: 09/25/2024  -     CBC and differential; Future; Expected date: 09/25/2024  2. Diabetic polyneuropathy associated with type 2 diabetes mellitus (HCC)  Assessment & Plan:  Will increrase ozempic to max dose and follow numbers in three months  Lab Results   Component Value Date    HGBA1C 7.3 (H) 06/21/2024     Orders:  -     semaglutide, 2 mg/dose, (Ozempic) 8 mg/ mL injection pen; Inject 0.75 mL (2 mg total) under the skin every 7 days  -     Albumin / creatinine urine ratio; Future; Expected date: 09/25/2024  -     Comprehensive metabolic panel; Future; Expected date: 09/25/2024  -     Hemoglobin A1C; Future; Expected date: 09/25/2024  -     CBC and differential; Future; Expected date: 09/25/2024  3. Mixed hyperlipidemia  -     Albumin / creatinine urine ratio; Future; Expected date: 09/25/2024  -     Comprehensive metabolic panel; Future; Expected date: 09/25/2024  -     Hemoglobin A1C; Future; Expected date: 09/25/2024  -     CBC and differential; Future; Expected date: 09/25/2024          There are no Patient Instructions on file for this visit.    No follow-ups on file.    Subjective:      Patient ID: Ernesto Garay is a 53 y.o. male.    Chief Complaint   Patient presents with   • Follow-up   • Diabetes     Sas/cma       Pt is here for a follow up          The following portions of the patient's history were reviewed and updated as appropriate: allergies, current medications, past family history, past medical history, past social history, past surgical history and problem list.    Review of Systems      Current Outpatient  Medications   Medication Sig Dispense Refill   • amLODIPine (NORVASC) 10 mg tablet Take 1 tablet by mouth once daily 90 tablet 1   • carvedilol (COREG) 25 mg tablet Take 1 tablet (25 mg total) by mouth 2 (two) times a day with meals 180 tablet 3   • Cholecalciferol (VITAMIN D) 2000 units tablet Take 1 tablet by mouth daily     • cloNIDine (CATAPRES) 0.2 mg tablet Take 1 tablet (0.2 mg total) by mouth 2 (two) times a day 180 tablet 1   • Coenzyme Q10 (CO Q 10 PO) Take by mouth     • glipiZIDE (GLUCOTROL XL) 2.5 mg 24 hr tablet Take 1 tablet by mouth once daily 90 tablet 1   • Insulin Degludec FlexTouch 200 UNIT/ML SOPN INJECT 58 UNITS SUBCUTANEOUSLY ONCE DAILY 27 mL 0   • Insulin Pen Needle (B-D ULTRAFINE III SHORT PEN) 31G X 8 MM MISC Inject under the skin 2 (two) times a day 100 each 0   • lisinopril (ZESTRIL) 40 mg tablet TAKE 1 TABLET BY MOUTH ONCE DAILY IN THE MORNING 90 tablet 1   • metFORMIN (GLUCOPHAGE) 1000 MG tablet TAKE 1 TABLET BY MOUTH (1,000 MG TOTAL) TWICE DAILY WITH MEALS 180 tablet 3   • Omega-3 Fatty Acids (fish oil) 1,000 mg Take 2 capsules (2,000 mg total) by mouth 2 (two) times a day 60 capsule 3   • rosuvastatin (CRESTOR) 20 MG tablet Take 1 tablet (20 mg total) by mouth daily 90 tablet 3   • semaglutide, 2 mg/dose, (Ozempic) 8 mg/ mL injection pen Inject 0.75 mL (2 mg total) under the skin every 7 days 9 mL 1   • sildenafil (VIAGRA) 100 mg tablet TAKE 1 TABLET BY MOUTH AS NEEDED FOR ERECTILE DYSFUNCTION. DO NOT USE MORE THAN ONE DOSE DAILY 8 tablet 5   • spironolactone (ALDACTONE) 25 mg tablet Take 1 tablet (25 mg total) by mouth daily in the early morning 90 tablet 3     No current facility-administered medications for this visit.       Objective:    /100   Pulse 90   Temp 98.7 °F (37.1 °C)   Resp 18   Wt (!) 147 kg (325 lb)   BMI 46.63 kg/m²        Physical Exam  Cardiovascular:      Pulses: no weak pulses.           Dorsalis pedis pulses are 2+ on the right side and 2+ on the left  side.        Posterior tibial pulses are 2+ on the right side and 2+ on the left side.   Feet:      Right foot:      Skin integrity: No ulcer, skin breakdown, erythema, warmth, callus or dry skin.      Left foot:      Skin integrity: No ulcer, skin breakdown, erythema, warmth, callus or dry skin.            Diabetic Foot Exam    Patient's shoes and socks removed.    Right Foot/Ankle   Right Foot Inspection  Skin Exam: skin normal. Skin not intact, no dry skin, no warmth, no callus, no erythema, no maceration, no abnormal color, no pre-ulcer, no ulcer and no callus.     Toe Exam: ROM and strength within normal limits.     Sensory   Vibration: intact  Proprioception: intact  Monofilament testing: intact    Vascular  Capillary refills: < 3 seconds  The right DP pulse is 2+. The right PT pulse is 2+.     Left Foot/Ankle  Left Foot Inspection  Skin Exam: skin normal. Skin not intact, no dry skin, no warmth, no erythema, no maceration, normal color, no pre-ulcer, no ulcer and no callus.     Toe Exam: ROM and strength within normal limits.     Sensory   Vibration: intact  Proprioception: intact  Monofilament testing: intact    Vascular  Capillary refills: < 3 seconds  The left DP pulse is 2+. The left PT pulse is 2+.     Assign Risk Category  No deformity present  No loss of protective sensation  No weak pulses  Risk: 0      Frank Lombardi, DO

## 2024-06-25 NOTE — ASSESSMENT & PLAN NOTE
Pt did miss his BP meds yesterday  Sees Jenaro and his BP was good a montgh ago  Also had a cold and is on OTC meds

## 2024-06-25 NOTE — TELEPHONE ENCOUNTER
Received another call today from Lb at the [facility] in [place]..    Pharmacy will not fill the spironolactone script until they get an ok from the physician.  Pharmacy concerned about interaction between spironolactone and lisinopril related to potassium.     Pharmacy # [phone]

## 2024-06-25 NOTE — ASSESSMENT & PLAN NOTE
Will increrase ozempic to max dose and follow numbers in three months  Lab Results   Component Value Date    HGBA1C 7.3 (H) 06/21/2024

## 2024-07-19 ENCOUNTER — TELEPHONE (OUTPATIENT)
Dept: FAMILY MEDICINE CLINIC | Facility: CLINIC | Age: 54
End: 2024-07-19

## 2024-07-19 NOTE — TELEPHONE ENCOUNTER
Received fax from pharmacy for Prior Authorization.    Medication: Ozempic (2MG/DOSE) 8MG/3ML Pen-injectors    Key: [id number]

## 2024-07-27 DIAGNOSIS — E11.42 DIABETIC POLYNEUROPATHY ASSOCIATED WITH TYPE 2 DIABETES MELLITUS (HCC): ICD-10-CM

## 2024-08-05 NOTE — TELEPHONE ENCOUNTER
Pt was calling in stating that the pharmacy informed him that he hasn't been able to get his refill on the semaglutide, 2 mg/dose,(Ozempic) 8 mg/ mL injection pen bc he needs a prior authorization. Pt stated that he has not taken this medication since last week and is completely out of medication. Pt stated that the prior authorization should be done by Shravan his member ID number is [id number] and RX group number is [id number]. Pt is requesting a call back once we get the approval or denial from his insurance. Please advise with the pt if needed thank you.

## 2024-08-07 NOTE — TELEPHONE ENCOUNTER
PA for semaglutide, 2 mg/dose, (Ozempic) 8 mg/ mL injection pen SUBMITTED     via    [][employer]-KEY   [][employer]-Case ID #   [x]Faxed to plan  ELOISA - [phone]  []Other website   []Phone call Case ID #     Office notes sent, clinical questions answered. Awaiting determination    Turnaround time for your insurance to make a decision on your Prior Authorization can take 7-21 business days.

## 2024-08-08 NOTE — TELEPHONE ENCOUNTER
PA for Semaglutide, 2 mg/dose, (Ozempic) 8 mg/ mL injection pen Approved     Date(s) approved Valid Indefinitely       Patient advised by          [x] MyChart Message  [] Phone call   []LMOM  []L/M to call office as no active Communication consent on file  []Unable to leave detailed message as VM not approved on Communication consent       Pharmacy advised by    [x]Fax  []Phone call    Approval letter scanned into Media Yes

## 2024-08-18 DIAGNOSIS — E11.42 DIABETIC POLYNEUROPATHY ASSOCIATED WITH TYPE 2 DIABETES MELLITUS (HCC): ICD-10-CM

## 2024-08-18 RX ORDER — INSULIN DEGLUDEC 200 U/ML
INJECTION, SOLUTION SUBCUTANEOUS
Qty: 27 ML | Refills: 1 | Status: SHIPPED | OUTPATIENT
Start: 2024-08-18

## 2024-08-22 ENCOUNTER — TELEPHONE (OUTPATIENT)
Dept: FAMILY MEDICINE CLINIC | Facility: CLINIC | Age: 54
End: 2024-08-22

## 2024-08-22 NOTE — TELEPHONE ENCOUNTER
Prior authorization request for Insulin Deglud 200 unit FLX INJ   Key not provided.  Emilia Back, CMA

## 2024-08-28 DIAGNOSIS — I11.0 MALIGNANT HYPERTENSION WITH SYSTOLIC CHF, NYHA CLASS 2 (HCC): ICD-10-CM

## 2024-08-28 DIAGNOSIS — I87.2 VENOUS INSUFFICIENCY: ICD-10-CM

## 2024-08-28 DIAGNOSIS — I10 HTN (HYPERTENSION), MALIGNANT: ICD-10-CM

## 2024-08-28 DIAGNOSIS — I50.20 SYSTOLIC HEART FAILURE (HCC): ICD-10-CM

## 2024-08-28 DIAGNOSIS — I50.20 MALIGNANT HYPERTENSION WITH SYSTOLIC CHF, NYHA CLASS 2 (HCC): ICD-10-CM

## 2024-08-28 DIAGNOSIS — I10 CHRONIC HYPERTENSION: ICD-10-CM

## 2024-08-29 RX ORDER — AMLODIPINE BESYLATE 10 MG/1
10 TABLET ORAL DAILY
Qty: 90 TABLET | Refills: 1 | Status: SHIPPED | OUTPATIENT
Start: 2024-08-29

## 2024-09-17 DIAGNOSIS — E11.42 DIABETIC POLYNEUROPATHY ASSOCIATED WITH TYPE 2 DIABETES MELLITUS (HCC): ICD-10-CM

## 2024-10-15 DIAGNOSIS — I11.0 MALIGNANT HYPERTENSION WITH SYSTOLIC CHF, NYHA CLASS 2 (HCC): ICD-10-CM

## 2024-10-15 DIAGNOSIS — I50.20 SYSTOLIC HEART FAILURE (HCC): ICD-10-CM

## 2024-10-15 DIAGNOSIS — E78.5 DYSLIPIDEMIA: ICD-10-CM

## 2024-10-15 DIAGNOSIS — I50.20 MALIGNANT HYPERTENSION WITH SYSTOLIC CHF, NYHA CLASS 2 (HCC): ICD-10-CM

## 2024-10-16 RX ORDER — LISINOPRIL 40 MG/1
TABLET ORAL
Qty: 90 TABLET | Refills: 1 | Status: SHIPPED | OUTPATIENT
Start: 2024-10-16

## 2024-10-22 DIAGNOSIS — I10 BENIGN ESSENTIAL HYPERTENSION: ICD-10-CM

## 2024-10-22 RX ORDER — CLONIDINE HYDROCHLORIDE 0.2 MG/1
0.2 TABLET ORAL 2 TIMES DAILY
Qty: 180 TABLET | Refills: 1 | Status: SHIPPED | OUTPATIENT
Start: 2024-10-22

## 2024-11-23 LAB
ALBUMIN SERPL-MCNC: 4.2 G/DL (ref 3.8–4.9)
ALBUMIN/CREAT UR: 16 MG/G CREAT (ref 0–29)
ALP SERPL-CCNC: 99 IU/L (ref 44–121)
ALT SERPL-CCNC: 24 IU/L (ref 0–44)
AST SERPL-CCNC: 19 IU/L (ref 0–40)
BASOPHILS # BLD AUTO: 0.1 X10E3/UL (ref 0–0.2)
BASOPHILS NFR BLD AUTO: 1 %
BILIRUB SERPL-MCNC: 0.5 MG/DL (ref 0–1.2)
BUN SERPL-MCNC: 9 MG/DL (ref 6–24)
BUN/CREAT SERPL: 12 (ref 9–20)
CALCIUM SERPL-MCNC: 9.2 MG/DL (ref 8.7–10.2)
CHLORIDE SERPL-SCNC: 102 MMOL/L (ref 96–106)
CO2 SERPL-SCNC: 25 MMOL/L (ref 20–29)
CREAT SERPL-MCNC: 0.73 MG/DL (ref 0.76–1.27)
CREAT UR-MCNC: 311.8 MG/DL
EGFR: 107 ML/MIN/1.73
EOSINOPHIL # BLD AUTO: 0.3 X10E3/UL (ref 0–0.4)
EOSINOPHIL NFR BLD AUTO: 3 %
ERYTHROCYTE [DISTWIDTH] IN BLOOD BY AUTOMATED COUNT: 13.5 % (ref 11.6–15.4)
GLOBULIN SER-MCNC: 2.7 G/DL (ref 1.5–4.5)
GLUCOSE SERPL-MCNC: 126 MG/DL (ref 70–99)
HBA1C MFR BLD: 7 % (ref 4.8–5.6)
HCT VFR BLD AUTO: 45.4 % (ref 37.5–51)
HGB BLD-MCNC: 14.8 G/DL (ref 13–17.7)
IMM GRANULOCYTES # BLD: 0 X10E3/UL (ref 0–0.1)
IMM GRANULOCYTES NFR BLD: 0 %
LYMPHOCYTES # BLD AUTO: 2.7 X10E3/UL (ref 0.7–3.1)
LYMPHOCYTES NFR BLD AUTO: 25 %
MCH RBC QN AUTO: 29.1 PG (ref 26.6–33)
MCHC RBC AUTO-ENTMCNC: 32.6 G/DL (ref 31.5–35.7)
MCV RBC AUTO: 89 FL (ref 79–97)
MICROALBUMIN UR-MCNC: 49 UG/ML
MONOCYTES # BLD AUTO: 0.6 X10E3/UL (ref 0.1–0.9)
MONOCYTES NFR BLD AUTO: 6 %
NEUTROPHILS # BLD AUTO: 7 X10E3/UL (ref 1.4–7)
NEUTROPHILS NFR BLD AUTO: 65 %
PLATELET # BLD AUTO: 231 X10E3/UL (ref 150–450)
POTASSIUM SERPL-SCNC: 4.4 MMOL/L (ref 3.5–5.2)
PROT SERPL-MCNC: 6.9 G/DL (ref 6–8.5)
RBC # BLD AUTO: 5.09 X10E6/UL (ref 4.14–5.8)
SODIUM SERPL-SCNC: 141 MMOL/L (ref 134–144)
WBC # BLD AUTO: 10.6 X10E3/UL (ref 3.4–10.8)

## 2024-11-26 ENCOUNTER — OFFICE VISIT (OUTPATIENT)
Dept: FAMILY MEDICINE CLINIC | Facility: CLINIC | Age: 54
End: 2024-11-26
Payer: COMMERCIAL

## 2024-11-26 ENCOUNTER — RESULTS FOLLOW-UP (OUTPATIENT)
Dept: FAMILY MEDICINE CLINIC | Facility: CLINIC | Age: 54
End: 2024-11-26

## 2024-11-26 VITALS
HEART RATE: 72 BPM | SYSTOLIC BLOOD PRESSURE: 126 MMHG | WEIGHT: 315 LBS | DIASTOLIC BLOOD PRESSURE: 80 MMHG | RESPIRATION RATE: 18 BRPM | TEMPERATURE: 96.7 F | BODY MASS INDEX: 45.1 KG/M2 | HEIGHT: 70 IN

## 2024-11-26 DIAGNOSIS — M79.671 HEEL PAIN, CHRONIC, RIGHT: ICD-10-CM

## 2024-11-26 DIAGNOSIS — I10 BENIGN ESSENTIAL HYPERTENSION: Primary | ICD-10-CM

## 2024-11-26 DIAGNOSIS — Z23 NEED FOR COVID-19 VACCINE: ICD-10-CM

## 2024-11-26 DIAGNOSIS — E78.2 MIXED HYPERLIPIDEMIA: ICD-10-CM

## 2024-11-26 DIAGNOSIS — G89.29 HEEL PAIN, CHRONIC, RIGHT: ICD-10-CM

## 2024-11-26 DIAGNOSIS — Z23 NEED FOR VACCINATION: ICD-10-CM

## 2024-11-26 DIAGNOSIS — E11.42 DIABETIC POLYNEUROPATHY ASSOCIATED WITH TYPE 2 DIABETES MELLITUS (HCC): ICD-10-CM

## 2024-11-26 PROCEDURE — 91320 SARSCV2 VAC 30MCG TRS-SUC IM: CPT

## 2024-11-26 PROCEDURE — 90471 IMMUNIZATION ADMIN: CPT

## 2024-11-26 PROCEDURE — 90673 RIV3 VACCINE NO PRESERV IM: CPT

## 2024-11-26 PROCEDURE — 99214 OFFICE O/P EST MOD 30 MIN: CPT | Performed by: FAMILY MEDICINE

## 2024-11-26 PROCEDURE — 90480 ADMN SARSCOV2 VAC 1/ONLY CMP: CPT

## 2024-11-26 NOTE — PROGRESS NOTES
Assessment/Plan:    1. Benign essential hypertension  -     Albumin / creatinine urine ratio; Future; Expected date: 03/26/2025  -     Comprehensive metabolic panel; Future; Expected date: 03/26/2025  -     Hemoglobin A1C; Future; Expected date: 03/26/2025  -     Lipid Panel with Direct LDL reflex; Future; Expected date: 03/26/2025  -     Albumin / creatinine urine ratio  -     Comprehensive metabolic panel  -     Hemoglobin A1C  -     Lipid Panel with Direct LDL reflex  2. Mixed hyperlipidemia  -     Albumin / creatinine urine ratio; Future; Expected date: 03/26/2025  -     Comprehensive metabolic panel; Future; Expected date: 03/26/2025  -     Hemoglobin A1C; Future; Expected date: 03/26/2025  -     Lipid Panel with Direct LDL reflex; Future; Expected date: 03/26/2025  -     Albumin / creatinine urine ratio  -     Comprehensive metabolic panel  -     Hemoglobin A1C  -     Lipid Panel with Direct LDL reflex  3. Diabetic polyneuropathy associated with type 2 diabetes mellitus (HCC)  -     Albumin / creatinine urine ratio; Future; Expected date: 03/26/2025  -     Comprehensive metabolic panel; Future; Expected date: 03/26/2025  -     Hemoglobin A1C; Future; Expected date: 03/26/2025  -     Lipid Panel with Direct LDL reflex; Future; Expected date: 03/26/2025  -     Albumin / creatinine urine ratio  -     Comprehensive metabolic panel  -     Hemoglobin A1C  -     Lipid Panel with Direct LDL reflex  4. Need for vaccination  -     influenza vaccine, recombinant, PF, 0.5 mL IM (Flublok)  5. Need for COVID-19 vaccine  -     COVID-19 Pfizer mRNA vaccine 12 yr and older (Comirnaty pre-filled syringe)  6. Heel pain, chronic, right  -     XR heel / calcaneus 2+ vw right; Future; Expected date: 11/26/2024  -     Diclofenac Sodium (VOLTAREN) 1 %; Apply 2 g topically 4 (four) times a day  OTC voltaren gel - 4g 4 x a day        There are no Patient Instructions on file for this visit.    Return in 4 months (on 3/26/2025) for  Diabetes follow-up.    Subjective:      Patient ID: Ernesto Garay is a 54 y.o. male.    Chief Complaint   Patient presents with    Follow-up     3 month f/u Michelle Michaels LPN         Pt is here to follow up labs    Pt states while he is here his rt heel hurts  Has been for month  Hurts when he drives. Not hurting right now  Dull ach        The following portions of the patient's history were reviewed and updated as appropriate: allergies, current medications, past family history, past medical history, past social history, past surgical history and problem list.    Review of Systems   Constitutional:  Negative for activity change, appetite change, chills, diaphoresis, fatigue, fever and unexpected weight change.   HENT:  Negative for congestion, dental problem, ear pain, mouth sores, sinus pressure, sinus pain, sore throat and trouble swallowing.    Eyes:  Negative for photophobia, discharge and itching.   Respiratory:  Negative for apnea, chest tightness and shortness of breath.    Cardiovascular:  Negative for chest pain, palpitations and leg swelling.   Gastrointestinal:  Negative for abdominal distention, abdominal pain, blood in stool, nausea and vomiting.   Endocrine: Negative for cold intolerance, heat intolerance, polydipsia, polyphagia and polyuria.   Genitourinary:  Negative for difficulty urinating.   Musculoskeletal:  Positive for arthralgias.   Skin:  Negative for color change and wound.   Neurological:  Negative for dizziness, syncope, speech difficulty and headaches.   Hematological:  Negative for adenopathy.   Psychiatric/Behavioral:  Negative for agitation and behavioral problems.          Current Outpatient Medications   Medication Sig Dispense Refill    amLODIPine (NORVASC) 10 mg tablet Take 1 tablet by mouth once daily 90 tablet 1    carvedilol (COREG) 25 mg tablet Take 1 tablet (25 mg total) by mouth 2 (two) times a day with meals 180 tablet 3    Cholecalciferol (VITAMIN D) 2000 units  "tablet Take 1 tablet by mouth daily      cloNIDine (CATAPRES) 0.2 mg tablet Take 1 tablet by mouth twice daily 180 tablet 1    Coenzyme Q10 (CO Q 10 PO) Take by mouth      Diclofenac Sodium (VOLTAREN) 1 % Apply 2 g topically 4 (four) times a day 240 g 0    glipiZIDE (GLUCOTROL XL) 2.5 mg 24 hr tablet Take 1 tablet by mouth once daily 90 tablet 1    Insulin Degludec FlexTouch 200 UNIT/ML SOPN INJECT 58 UNITS SUBCUTANEOUSLY ONCE DAILY 27 mL 1    Insulin Pen Needle (B-D ULTRAFINE III SHORT PEN) 31G X 8 MM MISC Inject under the skin 2 (two) times a day 100 each 0    lisinopril (ZESTRIL) 40 mg tablet TAKE 1 TABLET BY MOUTH ONCE DAILY IN THE MORNING 90 tablet 1    metFORMIN (GLUCOPHAGE) 1000 MG tablet Take 1 tablet (1,000 mg total) by mouth 2 (two) times a day with meals 180 tablet 3    Omega-3 Fatty Acids (fish oil) 1,000 mg Take 2 capsules (2,000 mg total) by mouth 2 (two) times a day 60 capsule 3    rosuvastatin (CRESTOR) 20 MG tablet Take 1 tablet (20 mg total) by mouth daily 90 tablet 3    semaglutide, 2 mg/dose, (Ozempic) 8 mg/ mL injection pen Inject 0.75 mL (2 mg total) under the skin every 7 days 9 mL 1    sildenafil (VIAGRA) 100 mg tablet TAKE 1 TABLET BY MOUTH AS NEEDED FOR ERECTILE DYSFUNCTION. DO NOT USE MORE THAN ONE DOSE DAILY 8 tablet 5    spironolactone (ALDACTONE) 25 mg tablet Take 1 tablet (25 mg total) by mouth daily in the early morning 90 tablet 3     No current facility-administered medications for this visit.       Objective:    /80   Pulse 72   Temp (!) 96.7 °F (35.9 °C)   Resp 18   Ht 5' 10\" (1.778 m)   Wt (!) 149 kg (328 lb 3.2 oz)   BMI 47.09 kg/m²        Physical Exam  Vitals and nursing note reviewed.   Constitutional:       General: He is not in acute distress.     Appearance: He is well-developed. He is not diaphoretic.   HENT:      Head: Normocephalic and atraumatic.      Right Ear: External ear normal.      Left Ear: External ear normal.      Nose: Nose normal.      " Mouth/Throat:      Pharynx: No oropharyngeal exudate.   Eyes:      General: No scleral icterus.        Right eye: No discharge.         Left eye: No discharge.      Pupils: Pupils are equal, round, and reactive to light.   Neck:      Thyroid: No thyromegaly.   Cardiovascular:      Rate and Rhythm: Normal rate.      Heart sounds: Normal heart sounds. No murmur heard.  Pulmonary:      Effort: Pulmonary effort is normal. No respiratory distress.      Breath sounds: Normal breath sounds. No wheezing.   Abdominal:      General: Bowel sounds are normal. There is no distension.      Palpations: Abdomen is soft. There is no mass.      Tenderness: There is no abdominal tenderness. There is no guarding or rebound.   Musculoskeletal:         General: Normal range of motion.   Skin:     General: Skin is warm and dry.      Findings: No erythema or rash.   Neurological:      Mental Status: He is alert.      Coordination: Coordination normal.      Deep Tendon Reflexes: Reflexes normal.   Psychiatric:         Behavior: Behavior normal.                Frank Lombardi, DO

## 2024-12-18 DIAGNOSIS — E11.42 DIABETIC POLYNEUROPATHY ASSOCIATED WITH TYPE 2 DIABETES MELLITUS (HCC): ICD-10-CM

## 2024-12-19 RX ORDER — GLIPIZIDE 2.5 MG/1
2.5 TABLET, EXTENDED RELEASE ORAL DAILY
Qty: 90 TABLET | Refills: 1 | Status: SHIPPED | OUTPATIENT
Start: 2024-12-19

## 2025-02-06 DIAGNOSIS — E11.42 DIABETIC POLYNEUROPATHY ASSOCIATED WITH TYPE 2 DIABETES MELLITUS (HCC): ICD-10-CM

## 2025-02-07 RX ORDER — SEMAGLUTIDE 2.68 MG/ML
INJECTION, SOLUTION SUBCUTANEOUS
Qty: 9 ML | Refills: 1 | Status: SHIPPED | OUTPATIENT
Start: 2025-02-07

## 2025-03-04 ENCOUNTER — HOSPITAL ENCOUNTER (OUTPATIENT)
Dept: RADIOLOGY | Facility: HOSPITAL | Age: 55
Discharge: HOME | End: 2025-03-04
Attending: PODIATRIST
Payer: COMMERCIAL

## 2025-03-04 ENCOUNTER — RESULTS FOLLOW-UP (OUTPATIENT)
Dept: FAMILY MEDICINE CLINIC | Facility: CLINIC | Age: 55
End: 2025-03-04

## 2025-03-04 ENCOUNTER — OFFICE VISIT (OUTPATIENT)
Dept: PODIATRY | Facility: CLINIC | Age: 55
End: 2025-03-04
Payer: COMMERCIAL

## 2025-03-04 VITALS — HEART RATE: 97 BPM | OXYGEN SATURATION: 97 % | WEIGHT: 315 LBS | HEIGHT: 70 IN | BODY MASS INDEX: 45.1 KG/M2

## 2025-03-04 DIAGNOSIS — M76.61 INSERTIONAL TENDINOPATHY OF RIGHT ACHILLES TENDON: Primary | ICD-10-CM

## 2025-03-04 DIAGNOSIS — M79.671 RIGHT FOOT PAIN: ICD-10-CM

## 2025-03-04 DIAGNOSIS — M77.51 BURSITIS OF POSTERIOR HEEL, RIGHT: ICD-10-CM

## 2025-03-04 PROCEDURE — 99203 OFFICE O/P NEW LOW 30 MIN: CPT | Performed by: PODIATRIST

## 2025-03-04 PROCEDURE — 73650 X-RAY EXAM OF HEEL: CPT

## 2025-03-04 NOTE — RESULT ENCOUNTER NOTE
Look slike the x ray showed some heel spurs - also looks like you were just seen by podiatry - excellent

## 2025-03-04 NOTE — PROGRESS NOTES
:  Assessment & Plan  Right foot pain    Orders:    XR heel / calcaneus 2+ vw right; Future      Insertional tendinopathy of right Achilles tendon         Bursitis of posterior heel, right       The patient's clinical examination today significant for mild tenderness with palpation of the posterior aspect of the right heel where a palpable retrocalcaneal spur is noted.  There is mild tenderness lateral squeeze of the retrocalcaneal bursa.  The Achilles tendon is palpable and continuous and without any defects.  Passive range of motion of the ankle hyper joints is within normal limits to the right lower extremity.  Pedal pulses are palpable.  There are no open wounds.    X-rays of the patient's right heel taken today were personally viewed and interpreted.  Findings were significant for a moderately sized retrocalcaneal spur.  A plantar calcaneal spur is also noted.  There are no fractures or dislocations identified.    The patient's clinical examination is most consistent with the retrocalcaneal bursitis and insertional Achilles tendinitis/enthesopathy.  Etiology and treatment goals were discussed with the patient.  We will focus on conservative care with supportive shoe gear and exercises at home to stretch out the Achilles and plantar fascia.  Some exercises were included in his AVS.  We can consider formal course of physical therapy if he does not make any progress with home PT.  There is value to OTC NSAID therapy which can be taken on an as-needed basis.  Recommendations were also included for some good-quality OTC arch supports that he can add to his shoes.    As his symptoms are relatively mild and intermittent, we will continue to monitor for now.  He will monitor his progress.  If he does not notice any significant improvement, we will consider a course of formal physical therapy.      History of Present Illness     Ernesto Garay is a 54 y.o. male   The patient presents today for his initial consultation  with [facility] podiatry with a chief complaint of posterior right heel pain that has been present for several weeks.  There is no history of any injury or trauma.  Certain activities do tend to exacerbate it, noting that he especially notices soreness in the back of his heel when driving and manipulating the gas and brake pedals.  He does take NSAID therapy on occasion for it.  Pain is not constant and tends to come and go.      PAST MEDICAL HISTORY:  Past Medical History:   Diagnosis Date    Acute bacterial prostatitis 09/28/2005    Last Assessed: 9/28/2005     Atrial flutter (HCC)     Last Assessed: 7/7/2017     Cellulitis of toe of right foot     Last Assessed: 3/10/2017     CPAP (continuous positive airway pressure) dependence     Dermatophytosis of groin     Last Assessed: 3/21/2016     Diabetes mellitus ([facility])     Erectile dysfunction     Hypertension     Hypokalemia     Last Assessed: 8/31/2015     Obesity     Last Assessed: 11/30/2015     Onychomycosis     Last Assessed: 2/15/2017     Sleep apnea     Tinea pedis of both feet     Last Assessed: 9/6/2016     Type 2 diabetes mellitus ([facility])        PAST SURGICAL HISTORY:  Past Surgical History:   Procedure Laterality Date    CARDIAC CATHETERIZATION      CARDIAC SURGERY  2015    flutter repair-ablation    VASECTOMY  2005    Last Assessed: 5/28/2015         ALLERGIES:  Patient has no known allergies.    MEDICATIONS:  Current Outpatient Medications   Medication Sig Dispense Refill    amLODIPine (NORVASC) 10 mg tablet Take 1 tablet by mouth once daily 90 tablet 1    carvedilol (COREG) 25 mg tablet Take 1 tablet (25 mg total) by mouth 2 (two) times a day with meals 180 tablet 3    Cholecalciferol (VITAMIN D) 2000 units tablet Take 1 tablet by mouth daily      cloNIDine (CATAPRES) 0.2 mg tablet Take 1 tablet by mouth twice daily 180 tablet 1    Coenzyme Q10 (CO Q 10 PO) Take by mouth      glipiZIDE (GLUCOTROL XL) 2.5 mg 24 hr tablet Take 1 tablet by mouth once daily 90  tablet 1    Insulin Degludec FlexTouch 200 UNIT/ML SOPN INJECT 58 UNITS SUBCUTANEOUSLY ONCE DAILY 27 mL 1    Insulin Pen Needle (B-D ULTRAFINE III SHORT PEN) 31G X 8 MM MISC Inject under the skin 2 (two) times a day 100 each 0    lisinopril (ZESTRIL) 40 mg tablet TAKE 1 TABLET BY MOUTH ONCE DAILY IN THE MORNING 90 tablet 1    metFORMIN (GLUCOPHAGE) 1000 MG tablet Take 1 tablet (1,000 mg total) by mouth 2 (two) times a day with meals 180 tablet 3    Omega-3 Fatty Acids (fish oil) 1,000 mg Take 2 capsules (2,000 mg total) by mouth 2 (two) times a day 60 capsule 3    rosuvastatin (CRESTOR) 20 MG tablet Take 1 tablet (20 mg total) by mouth daily 90 tablet 3    semaglutide, 2 mg/dose, (Ozempic, 2 MG/DOSE,) 8 mg/ mL injection pen INJECT 0.75 ML (2 MG TOTAL) SUBCUTANEOUSLY  ONCE A WEEK 9 mL 1    sildenafil (VIAGRA) 100 mg tablet TAKE 1 TABLET BY MOUTH AS NEEDED FOR ERECTILE DYSFUNCTION. DO NOT USE MORE THAN ONE DOSE DAILY 8 tablet 5    spironolactone (ALDACTONE) 25 mg tablet Take 1 tablet (25 mg total) by mouth daily in the early morning 90 tablet 3    Diclofenac Sodium (VOLTAREN) 1 % Apply 2 g topically 4 (four) times a day (Patient not taking: Reported on 3/4/2025) 240 g 0     No current facility-administered medications for this visit.       SOCIAL HISTORY:  Social History     Socioeconomic History    Marital status: /Civil Union     Spouse name: None    Number of children: None    Years of education: None    Highest education level: None   Occupational History    None   Tobacco Use    Smoking status: Former     Current packs/day: 0.00     Average packs/day: 1 pack/day for 30.0 years (30.0 ttl pk-yrs)     Types: Cigarettes     Start date: 6/15/1985     Quit date: 6/15/2015     Years since quittin.7     Passive exposure: Past    Smokeless tobacco: Never   Vaping Use    Vaping status: Never Used   Substance and Sexual Activity    Alcohol use: Yes     Comment: 1 drink/month- occasional alcohol use     Drug use:  "No    Sexual activity: None   Other Topics Concern    None   Social History Narrative    Sleeps 6-7 hours a day     Exercise: Walking      Social Drivers of Health     Financial Resource Strain: Not on file   Food Insecurity: Not on file   Transportation Needs: Not on file   Physical Activity: Not on file   Stress: Not on file   Social Connections: Not on file   Intimate Partner Violence: Not on file   Housing Stability: Not on file      Review of Systems   Constitutional: Negative.    HENT: Negative.     Eyes: Negative.    Respiratory: Negative.     Cardiovascular: Negative.    Endocrine: Negative.    Musculoskeletal: Negative.    Neurological: Negative.    Hematological: Negative.    Psychiatric/Behavioral: Negative.       Objective   Pulse 97   Ht 5' 10\" (1.778 m)   Wt (!) 150 kg (330 lb)   SpO2 97%   BMI 47.35 kg/m²      Physical Exam  Constitutional:       Appearance: Normal appearance.   HENT:      Head: Normocephalic and atraumatic.   Eyes:      Conjunctiva/sclera: Conjunctivae normal.   Cardiovascular:      Pulses:           Dorsalis pedis pulses are 2+ on the right side.        Posterior tibial pulses are 2+ on the right side.   Pulmonary:      Effort: Pulmonary effort is normal.   Musculoskeletal:        Feet:    Feet:      Right foot:      Skin integrity: Skin integrity normal.      Comments: The patient's clinical examination today significant for mild tenderness with palpation of the posterior aspect of the right heel where a palpable retrocalcaneal spur is noted.  There is mild tenderness lateral squeeze of the retrocalcaneal bursa.  The Achilles tendon is palpable and continuous and without any defects.  Passive range of motion of the ankle hyper joints is within normal limits to the right lower extremity.  Pedal pulses are palpable.  There are no open wounds.    Skin:     General: Skin is warm and dry.      Capillary Refill: Capillary refill takes less than 2 seconds.   Neurological:      " General: No focal deficit present.      Mental Status: He is alert and oriented to person, place, and time.   Psychiatric:         Mood and Affect: Mood normal.

## 2025-03-04 NOTE — PATIENT INSTRUCTIONS
Recommend quality over the counter arch supports:    - Powerstep Marathon series insoles  - Spenco Orthotic Arch insoles  - Superfeet insoles for high arches  - PCSsole- 3/4 length insoles

## 2025-03-08 DIAGNOSIS — I10 HTN (HYPERTENSION), MALIGNANT: ICD-10-CM

## 2025-03-08 DIAGNOSIS — I11.0 MALIGNANT HYPERTENSION WITH SYSTOLIC CHF, NYHA CLASS 2 (HCC): ICD-10-CM

## 2025-03-08 DIAGNOSIS — I10 CHRONIC HYPERTENSION: ICD-10-CM

## 2025-03-08 DIAGNOSIS — I50.20 SYSTOLIC HEART FAILURE (HCC): ICD-10-CM

## 2025-03-08 DIAGNOSIS — I50.20 MALIGNANT HYPERTENSION WITH SYSTOLIC CHF, NYHA CLASS 2 (HCC): ICD-10-CM

## 2025-03-08 DIAGNOSIS — I87.2 VENOUS INSUFFICIENCY: ICD-10-CM

## 2025-03-08 LAB
LEFT EYE DIABETIC RETINOPATHY: NORMAL
RIGHT EYE DIABETIC RETINOPATHY: NORMAL

## 2025-03-09 RX ORDER — AMLODIPINE BESYLATE 10 MG/1
10 TABLET ORAL DAILY
Qty: 90 TABLET | Refills: 0 | Status: SHIPPED | OUTPATIENT
Start: 2025-03-09

## 2025-03-17 DIAGNOSIS — E11.42 DIABETIC POLYNEUROPATHY ASSOCIATED WITH TYPE 2 DIABETES MELLITUS (HCC): ICD-10-CM

## 2025-03-18 RX ORDER — INSULIN DEGLUDEC 200 U/ML
INJECTION, SOLUTION SUBCUTANEOUS
Qty: 27 ML | Refills: 2 | Status: SHIPPED | OUTPATIENT
Start: 2025-03-18

## 2025-03-20 ENCOUNTER — RA CDI HCC (OUTPATIENT)
Dept: OTHER | Facility: HOSPITAL | Age: 55
End: 2025-03-20

## 2025-03-20 NOTE — PROGRESS NOTES
HCC coding opportunities          Chart Reviewed number of suggestions sent to Provider: 1  I11.0     Patients Insurance        Commercial Insurance: [employer] Commercial [employer]

## 2025-04-07 LAB
ALBUMIN SERPL-MCNC: 4.2 G/DL (ref 3.8–4.9)
ALBUMIN/CREAT UR: 33 MG/G CREAT (ref 0–29)
ALP SERPL-CCNC: 105 IU/L (ref 44–121)
ALT SERPL-CCNC: 23 IU/L (ref 0–44)
AST SERPL-CCNC: 22 IU/L (ref 0–40)
BILIRUB SERPL-MCNC: 0.6 MG/DL (ref 0–1.2)
BUN SERPL-MCNC: 9 MG/DL (ref 6–24)
BUN/CREAT SERPL: 11 (ref 9–20)
CALCIUM SERPL-MCNC: 9.4 MG/DL (ref 8.7–10.2)
CHLORIDE SERPL-SCNC: 101 MMOL/L (ref 96–106)
CHOLEST SERPL-MCNC: 96 MG/DL (ref 100–199)
CO2 SERPL-SCNC: 23 MMOL/L (ref 20–29)
CREAT SERPL-MCNC: 0.81 MG/DL (ref 0.76–1.27)
CREAT UR-MCNC: 362.5 MG/DL
EGFR: 104 ML/MIN/1.73
EST. AVERAGE GLUCOSE BLD GHB EST-MCNC: 166 MG/DL
GLOBULIN SER-MCNC: 2.9 G/DL (ref 1.5–4.5)
GLUCOSE SERPL-MCNC: 129 MG/DL (ref 70–99)
HBA1C MFR BLD: 7.4 % (ref 4.8–5.6)
HDLC SERPL-MCNC: 30 MG/DL
LDLC SERPL CALC-MCNC: 42 MG/DL (ref 0–99)
LDLC/HDLC SERPL: 1.4 RATIO (ref 0–3.6)
MICROALBUMIN UR-MCNC: 118.8 UG/ML
MICRODELETION SYND BLD/T FISH: NORMAL
POTASSIUM SERPL-SCNC: 4.4 MMOL/L (ref 3.5–5.2)
PROT SERPL-MCNC: 7.1 G/DL (ref 6–8.5)
SL AMB VLDL CHOLESTEROL CALC: 24 MG/DL (ref 5–40)
SODIUM SERPL-SCNC: 139 MMOL/L (ref 134–144)
TRIGL SERPL-MCNC: 139 MG/DL (ref 0–149)

## 2025-04-08 ENCOUNTER — RESULTS FOLLOW-UP (OUTPATIENT)
Dept: FAMILY MEDICINE CLINIC | Facility: CLINIC | Age: 55
End: 2025-04-08

## 2025-04-10 ENCOUNTER — OFFICE VISIT (OUTPATIENT)
Dept: FAMILY MEDICINE CLINIC | Facility: CLINIC | Age: 55
End: 2025-04-10
Payer: COMMERCIAL

## 2025-04-10 VITALS
WEIGHT: 315 LBS | TEMPERATURE: 97.1 F | SYSTOLIC BLOOD PRESSURE: 126 MMHG | DIASTOLIC BLOOD PRESSURE: 80 MMHG | HEIGHT: 70 IN | BODY MASS INDEX: 45.1 KG/M2 | HEART RATE: 56 BPM

## 2025-04-10 DIAGNOSIS — E78.2 MIXED HYPERLIPIDEMIA: ICD-10-CM

## 2025-04-10 DIAGNOSIS — E11.42 DIABETIC POLYNEUROPATHY ASSOCIATED WITH TYPE 2 DIABETES MELLITUS (HCC): ICD-10-CM

## 2025-04-10 DIAGNOSIS — Z12.5 SCREENING FOR PROSTATE CANCER: ICD-10-CM

## 2025-04-10 DIAGNOSIS — I10 BENIGN ESSENTIAL HYPERTENSION: Primary | ICD-10-CM

## 2025-04-10 PROCEDURE — 99214 OFFICE O/P EST MOD 30 MIN: CPT | Performed by: FAMILY MEDICINE

## 2025-04-10 RX ORDER — GLIPIZIDE 5 MG/1
5 TABLET, FILM COATED, EXTENDED RELEASE ORAL DAILY
Qty: 90 TABLET | Refills: 1 | Status: SHIPPED | OUTPATIENT
Start: 2025-04-10 | End: 2025-10-07

## 2025-04-10 NOTE — ASSESSMENT & PLAN NOTE
Lab Results   Component Value Date    HGBA1C 7.4 (H) 04/04/2025       Orders:    glipiZIDE (GLUCOTROL XL) 5 mg 24 hr tablet; Take 1 tablet (5 mg total) by mouth daily    Albumin / creatinine urine ratio; Future    Comprehensive metabolic panel; Future    Hemoglobin A1C; Future    CBC and differential; Future

## 2025-04-10 NOTE — ASSESSMENT & PLAN NOTE
Orders:    Albumin / creatinine urine ratio; Future    Comprehensive metabolic panel; Future    Hemoglobin A1C; Future    CBC and differential; Future

## 2025-04-10 NOTE — PROGRESS NOTES
Name: Ernesto Garay      : 1970      MRN: [id number]  Encounter Provider: Frank Lombardi, DO  Encounter Date: 4/10/2025   Encounter department: [facility]  :  Assessment & Plan  Benign essential hypertension  stable    Orders:    Albumin / creatinine urine ratio; Future    Comprehensive metabolic panel; Future    Hemoglobin A1C; Future    CBC and differential; Future    Mixed hyperlipidemia    Orders:    Albumin / creatinine urine ratio; Future    Comprehensive metabolic panel; Future    Hemoglobin A1C; Future    CBC and differential; Future    Diabetic polyneuropathy associated with type 2 diabetes mellitus (HCC)    Lab Results   Component Value Date    HGBA1C 7.4 (H) 2025       Orders:    glipiZIDE (GLUCOTROL XL) 5 mg 24 hr tablet; Take 1 tablet (5 mg total) by mouth daily    Albumin / creatinine urine ratio; Future    Comprehensive metabolic panel; Future    Hemoglobin A1C; Future    CBC and differential; Future           History of Present Illness   HPI  Review of Systems   Constitutional:  Negative for activity change, appetite change, chills, diaphoresis, fatigue, fever and unexpected weight change.   HENT:  Negative for congestion, dental problem, ear pain, mouth sores, sinus pressure, sinus pain, sore throat and trouble swallowing.    Eyes:  Negative for photophobia, discharge and itching.   Respiratory:  Negative for apnea, chest tightness and shortness of breath.    Cardiovascular:  Negative for chest pain, palpitations and leg swelling.   Gastrointestinal:  Negative for abdominal distention, abdominal pain, blood in stool, nausea and vomiting.   Endocrine: Negative for cold intolerance, heat intolerance, polydipsia, polyphagia and polyuria.   Genitourinary:  Negative for difficulty urinating.   Musculoskeletal:  Negative for arthralgias.   Skin:  Negative for color change and wound.   Neurological:  Negative for dizziness, syncope, speech difficulty and headaches.  "  Hematological:  Negative for adenopathy.   Psychiatric/Behavioral:  Negative for agitation and behavioral problems.        Objective   /80   Pulse 56   Temp (!) 97.1 °F (36.2 °C)   Ht 5' 10\" (1.778 m)   Wt (!) 148 kg (326 lb 6.4 oz)   BMI 46.83 kg/m²      Physical Exam  Vitals and nursing note reviewed.   Constitutional:       General: He is not in acute distress.     Appearance: He is well-developed. He is not diaphoretic.   HENT:      Head: Normocephalic and atraumatic.      Right Ear: External ear normal.      Left Ear: External ear normal.      Nose: Nose normal.      Mouth/Throat:      Pharynx: No oropharyngeal exudate.   Eyes:      General: No scleral icterus.        Right eye: No discharge.         Left eye: No discharge.      Pupils: Pupils are equal, round, and reactive to light.   Neck:      Thyroid: No thyromegaly.   Cardiovascular:      Rate and Rhythm: Normal rate.      Heart sounds: Normal heart sounds. No murmur heard.  Pulmonary:      Effort: Pulmonary effort is normal. No respiratory distress.      Breath sounds: Normal breath sounds. No wheezing.   Abdominal:      General: Bowel sounds are normal. There is no distension.      Palpations: Abdomen is soft. There is no mass.      Tenderness: There is no abdominal tenderness. There is no guarding or rebound.   Musculoskeletal:         General: Normal range of motion.   Skin:     General: Skin is warm and dry.      Findings: No erythema or rash.   Neurological:      Mental Status: He is alert.      Coordination: Coordination normal.      Deep Tendon Reflexes: Reflexes normal.   Psychiatric:         Behavior: Behavior normal.         "

## 2025-04-10 NOTE — ASSESSMENT & PLAN NOTE
stable    Orders:    Albumin / creatinine urine ratio; Future    Comprehensive metabolic panel; Future    Hemoglobin A1C; Future    CBC and differential; Future

## 2025-04-12 DIAGNOSIS — E78.5 DYSLIPIDEMIA: ICD-10-CM

## 2025-04-12 DIAGNOSIS — I50.20 SYSTOLIC HEART FAILURE (HCC): ICD-10-CM

## 2025-04-12 DIAGNOSIS — I50.20 MALIGNANT HYPERTENSION WITH SYSTOLIC CHF, NYHA CLASS 2 (HCC): ICD-10-CM

## 2025-04-12 DIAGNOSIS — I11.0 MALIGNANT HYPERTENSION WITH SYSTOLIC CHF, NYHA CLASS 2 (HCC): ICD-10-CM

## 2025-04-13 RX ORDER — LISINOPRIL 40 MG/1
40 TABLET ORAL EVERY MORNING
Qty: 90 TABLET | Refills: 1 | Status: SHIPPED | OUTPATIENT
Start: 2025-04-13

## 2025-05-11 DIAGNOSIS — I10 HTN (HYPERTENSION), MALIGNANT: ICD-10-CM

## 2025-05-16 RX ORDER — CARVEDILOL 25 MG/1
25 TABLET ORAL 2 TIMES DAILY WITH MEALS
Qty: 180 TABLET | Refills: 0 | Status: SHIPPED | OUTPATIENT
Start: 2025-05-16

## 2025-05-29 DIAGNOSIS — N52.01 ERECTILE DYSFUNCTION DUE TO ARTERIAL INSUFFICIENCY: ICD-10-CM

## 2025-05-30 RX ORDER — SILDENAFIL 100 MG/1
TABLET, FILM COATED ORAL
Qty: 4 TABLET | Refills: 1 | Status: SHIPPED | OUTPATIENT
Start: 2025-05-30

## 2025-06-10 DIAGNOSIS — I50.20 MALIGNANT HYPERTENSION WITH SYSTOLIC CHF, NYHA CLASS 2 (HCC): ICD-10-CM

## 2025-06-10 DIAGNOSIS — I87.2 VENOUS INSUFFICIENCY: ICD-10-CM

## 2025-06-10 DIAGNOSIS — I10 CHRONIC HYPERTENSION: ICD-10-CM

## 2025-06-10 DIAGNOSIS — I50.20 SYSTOLIC HEART FAILURE (HCC): ICD-10-CM

## 2025-06-10 DIAGNOSIS — I11.0 MALIGNANT HYPERTENSION WITH SYSTOLIC CHF, NYHA CLASS 2 (HCC): ICD-10-CM

## 2025-06-10 DIAGNOSIS — I10 HTN (HYPERTENSION), MALIGNANT: ICD-10-CM

## 2025-06-10 RX ORDER — AMLODIPINE BESYLATE 10 MG/1
10 TABLET ORAL DAILY
Qty: 90 TABLET | Refills: 0 | Status: SHIPPED | OUTPATIENT
Start: 2025-06-10

## 2025-06-24 DIAGNOSIS — I10 BENIGN ESSENTIAL HYPERTENSION: ICD-10-CM

## 2025-06-24 DIAGNOSIS — I50.20 SYSTOLIC HEART FAILURE (HCC): ICD-10-CM

## 2025-06-24 RX ORDER — CLONIDINE HYDROCHLORIDE 0.2 MG/1
0.2 TABLET ORAL 2 TIMES DAILY
Qty: 180 TABLET | Refills: 0 | Status: SHIPPED | OUTPATIENT
Start: 2025-06-24

## 2025-07-01 RX ORDER — SPIRONOLACTONE 25 MG/1
25 TABLET ORAL EVERY MORNING
Qty: 90 TABLET | Refills: 0 | Status: SHIPPED | OUTPATIENT
Start: 2025-07-01

## 2025-07-21 ENCOUNTER — OFFICE VISIT (OUTPATIENT)
Dept: CARDIOLOGY CLINIC | Facility: CLINIC | Age: 55
End: 2025-07-21
Payer: COMMERCIAL

## 2025-07-21 VITALS
DIASTOLIC BLOOD PRESSURE: 82 MMHG | WEIGHT: 315 LBS | OXYGEN SATURATION: 98 % | BODY MASS INDEX: 45.1 KG/M2 | HEIGHT: 70 IN | SYSTOLIC BLOOD PRESSURE: 132 MMHG | HEART RATE: 92 BPM

## 2025-07-21 DIAGNOSIS — I50.20 SYSTOLIC HEART FAILURE (HCC): ICD-10-CM

## 2025-07-21 DIAGNOSIS — I10 BENIGN ESSENTIAL HYPERTENSION: Primary | ICD-10-CM

## 2025-07-21 DIAGNOSIS — E78.5 DYSLIPIDEMIA: ICD-10-CM

## 2025-07-21 DIAGNOSIS — I50.20 MALIGNANT HYPERTENSION WITH SYSTOLIC CHF, NYHA CLASS 2 (HCC): ICD-10-CM

## 2025-07-21 DIAGNOSIS — I11.0 MALIGNANT HYPERTENSION WITH SYSTOLIC CHF, NYHA CLASS 2 (HCC): ICD-10-CM

## 2025-07-21 DIAGNOSIS — I10 HTN (HYPERTENSION), MALIGNANT: ICD-10-CM

## 2025-07-21 DIAGNOSIS — E78.2 MIXED HYPERLIPIDEMIA: ICD-10-CM

## 2025-07-21 DIAGNOSIS — Z98.890 STATUS POST CATHETER ABLATION OF ATRIAL FLUTTER: ICD-10-CM

## 2025-07-21 PROCEDURE — 93000 ELECTROCARDIOGRAM COMPLETE: CPT | Performed by: NURSE PRACTITIONER

## 2025-07-21 PROCEDURE — 99214 OFFICE O/P EST MOD 30 MIN: CPT | Performed by: NURSE PRACTITIONER

## 2025-07-21 RX ORDER — SPIRONOLACTONE 25 MG/1
25 TABLET ORAL EVERY MORNING
Qty: 90 TABLET | Refills: 3 | Status: SHIPPED | OUTPATIENT
Start: 2025-07-21

## 2025-07-21 RX ORDER — CHLORAL HYDRATE 500 MG
2000 CAPSULE ORAL 2 TIMES DAILY
Qty: 60 CAPSULE | Refills: 3 | Status: SHIPPED | OUTPATIENT
Start: 2025-07-21

## 2025-07-21 RX ORDER — CARVEDILOL 25 MG/1
25 TABLET ORAL 2 TIMES DAILY WITH MEALS
Qty: 180 TABLET | Refills: 3 | Status: SHIPPED | OUTPATIENT
Start: 2025-07-21

## 2025-07-21 NOTE — PROGRESS NOTES
Name: Ernesto Garay      : 1970      MRN: [id number]  Encounter Provider: JAX Lemus  Encounter Date: 2025   Encounter department: [facility] CARDIOLOGY [facility]  :  Assessment & Plan  Malignant hypertension with systolic CHF, NYHA class 2 (HCC)  Wt Readings from Last 3 Encounters:   25 (!) 149 kg (328 lb)   04/10/25 (!) 148 kg (326 lb 6.4 oz)   25 (!) 150 kg (330 lb)   blood pressure stable   continue Norvasc 10 mg daily  continue Coreg 25 mg BID  continue clonidine 0.2 mg BID  continue lisinopril 40 mg once a day  continue Aldactone 25 mg daily    Orders:    POCT ECG    Dyslipidemia  2025 lipid panel: total cholesterol 96, triglycerides 139, HDL 30 and LDL 42  continue rosuvastatin 20 mg daily  patient uses omega-3 fish oil over-the-counter  Orders:    Omega-3 Fatty Acids (fish oil) 1,000 mg; Take 2 capsules (2,000 mg total) by mouth 2 (two) times a day    Systolic heart failure (HCC)  Wt Readings from Last 3 Encounters:   25 (!) 149 kg (328 lb)   04/10/25 (!) 148 kg (326 lb 6.4 oz)   25 (!) 150 kg (330 lb)   patient appears to be euvolemic  continue Coreg 25 mg BID  continue Aldactone 25 mg once a day  continue lisinopril 40 mg daily  continue Norvasc 10 mg daily  low sodium diet    Orders:    spironolactone (ALDACTONE) 25 mg tablet; Take 1 tablet (25 mg total) by mouth every morning        History of Present Illness   HPI  Ernesto Garay is a 54 y.o. male who presents for routine follow-up for history of hypertension, dyslipidemia and diabetes. Patient is very happy that his lipid panel has improved over the last 1 to 2 years. He notes [place] He has been compliant with all his medications and is not experience any complaints of chest pain, shortness of breath, palpitations or dyspnea.      Review of Systems   Constitutional: Negative.  Negative for activity change, fatigue and fever.    HENT: Negative.  Negative for congestion, facial swelling, sinus pressure and trouble swallowing.    Eyes: Negative.  Negative for photophobia and visual disturbance.   Respiratory: Negative.  Negative for chest tightness and shortness of breath.    Cardiovascular: Negative.  Negative for chest pain, palpitations and leg swelling.   Gastrointestinal:  Negative for abdominal distention.   Endocrine: Negative.  Negative for polydipsia, polyphagia and polyuria.   Genitourinary: Negative.  Negative for difficulty urinating.   Musculoskeletal: Negative.    Skin: Negative.    Neurological:  Negative for dizziness, syncope, weakness and light-headedness.   Hematological: Negative.    Psychiatric/Behavioral: Negative.       Past Medical History   Past Medical History[1]  Past Surgical History[2]  Family History[3]   reports that he quit smoking about 10 years ago. His smoking use included cigarettes. He started smoking about 40 years ago. He has a 30 pack-year smoking history. He has been exposed to tobacco smoke. He has never used smokeless tobacco. He reports current alcohol use. He reports that he does not use drugs.  Current Outpatient Medications   Medication Instructions    amLODIPine (NORVASC) 10 mg, Oral, Daily    carvedilol (COREG) 25 mg, Oral, 2 times daily with meals    Cholecalciferol (VITAMIN D) 2000 units tablet 1 tablet, Daily    cloNIDine (CATAPRES) 0.2 mg, Oral, 2 times daily    Coenzyme Q10 (CO Q 10 PO) Take by mouth    fish oil 2,000 mg, Oral, 2 times daily    glipiZIDE (GLUCOTROL XL) 5 mg, Oral, Daily    insulin degludec (Tresiba FlexTouch) 200 units/mL CONCENTRATED U-200 injection pen INJECT 58 UNITS SUBCUTANEOUSLY ONCE DAILY    Insulin Pen Needle (B-D ULTRAFINE III SHORT PEN) 31G X 8 MM MISC Subcutaneous, 2 times daily    lisinopril (ZESTRIL) 40 mg, Oral, Every morning    metFORMIN (GLUCOPHAGE) 1,000 mg, Oral, 2 times daily with meals    rosuvastatin (CRESTOR) 20 mg, Oral, Daily    semaglutide, 2  "mg/dose, (Ozempic, 2 MG/DOSE,) 8 mg/ mL injection pen INJECT 0.75 ML (2 MG TOTAL) SUBCUTANEOUSLY  ONCE A WEEK    sildenafil (VIAGRA) 100 mg tablet TAKE 1 TABLET BY MOUTH ONCE DAILY AS NEEDED FOR ERECTILE DYSFUNCTION. DO NOT USE MORE THAN ONE DOSE DAILY.    spironolactone (ALDACTONE) 25 mg, Oral, Every morning   Allergies[4]   Medications Ordered Prior to Encounter[5]   Social History[6]     Objective   /82 (BP Location: Left arm, Patient Position: Sitting, Cuff Size: Large)   Pulse 92   Ht 5' 10\" (1.778 m)   Wt (!) 149 kg (328 lb)   SpO2 98%   BMI 47.06 kg/m²      Physical Exam  Vitals and nursing note reviewed.   Constitutional:       Appearance: Normal appearance. He is morbidly obese.   HENT:      Right Ear: External ear normal.      Left Ear: External ear normal.     Eyes:      General:         Left eye: No discharge.       Cardiovascular:      Rate and Rhythm: Normal rate and regular rhythm.      Pulses: Normal pulses.   Pulmonary:      Effort: Pulmonary effort is normal. No respiratory distress.      Breath sounds: Normal breath sounds.   Abdominal:      General: There is no distension.      Palpations: Abdomen is soft.     Musculoskeletal:      Right lower leg: No edema.      Left lower leg: No edema.     Skin:     General: Skin is warm and dry.      Capillary Refill: Capillary refill takes less than 2 seconds.     Neurological:      Mental Status: He is alert and oriented to person, place, and time. Mental status is at baseline.     Psychiatric:         Mood and Affect: Mood normal.         Behavior: Behavior is cooperative.         Micaela CAMARA  Cardiology       [1]   Past Medical History:  Diagnosis Date    Acute bacterial prostatitis 09/28/2005    Last Assessed: 9/28/2005     Atrial flutter (HCC)     Last Assessed: 7/7/2017     Cellulitis of toe of right foot     Last Assessed: 3/10/2017     CPAP (continuous positive airway pressure) dependence     Dermatophytosis of groin     Last " Assessed: 3/21/2016     Diabetes mellitus (HCC)     Erectile dysfunction     Hypertension     Hypokalemia     Last Assessed: 8/31/2015     Obesity     Last Assessed: 11/30/2015     Onychomycosis     Last Assessed: 2/15/2017     Sleep apnea     Tinea pedis of both feet     Last Assessed: 9/6/2016     Type 2 diabetes mellitus (HCC)    [2]   Past Surgical History:  Procedure Laterality Date    CARDIAC CATHETERIZATION      CARDIAC SURGERY  2015    flutter repair-ablation    VASECTOMY  2005    Last Assessed: 5/28/2015    [3]   Family History  Problem Relation Name Age of Onset    Arthritis Mother      Arrhythmia Mother      Hypertension Mother      Anxiety disorder Father      Arthritis Father      Atrial fibrillation Father      Hypertension Father      Other Father          Impaired cognition     Mental illness Neg Hx      Diabetes Neg Hx      Cancer Neg Hx      Stroke Neg Hx     [4] No Known Allergies  [5]   Current Outpatient Medications on File Prior to Visit   Medication Sig Dispense Refill    amLODIPine (NORVASC) 10 mg tablet Take 1 tablet by mouth once daily 90 tablet 0    Cholecalciferol (VITAMIN D) 2000 units tablet Take 1 tablet by mouth in the morning.      cloNIDine (CATAPRES) 0.2 mg tablet Take 1 tablet by mouth twice daily 180 tablet 0    Coenzyme Q10 (CO Q 10 PO) Take by mouth      glipiZIDE (GLUCOTROL XL) 5 mg 24 hr tablet Take 1 tablet (5 mg total) by mouth daily 90 tablet 1    insulin degludec (Tresiba FlexTouch) 200 units/mL CONCENTRATED U-200 injection pen INJECT 58 UNITS SUBCUTANEOUSLY ONCE DAILY 27 mL 2    Insulin Pen Needle (B-D ULTRAFINE III SHORT PEN) 31G X 8 MM MISC Inject under the skin 2 (two) times a day 100 each 0    lisinopril (ZESTRIL) 40 mg tablet TAKE 1 TABLET BY MOUTH ONCE DAILY IN THE MORNING 90 tablet 1    metFORMIN (GLUCOPHAGE) 1000 MG tablet Take 1 tablet (1,000 mg total) by mouth 2 (two) times a day with meals 180 tablet 3    rosuvastatin (CRESTOR) 20 MG tablet Take 1 tablet (20  mg total) by mouth daily 90 tablet 3    semaglutide, 2 mg/dose, (Ozempic, 2 MG/DOSE,) 8 mg/ mL injection pen INJECT 0.75 ML (2 MG TOTAL) SUBCUTANEOUSLY  ONCE A WEEK 9 mL 1    sildenafil (VIAGRA) 100 mg tablet TAKE 1 TABLET BY MOUTH ONCE DAILY AS NEEDED FOR ERECTILE DYSFUNCTION. DO NOT USE MORE THAN ONE DOSE DAILY. 4 tablet 1    [DISCONTINUED] carvedilol (COREG) 25 mg tablet TAKE 1 TABLET BY MOUTH TWICE DAILY WITH MEALS 180 tablet 0    [DISCONTINUED] Omega-3 Fatty Acids (fish oil) 1,000 mg Take 2 capsules (2,000 mg total) by mouth 2 (two) times a day 60 capsule 3    [DISCONTINUED] spironolactone (ALDACTONE) 25 mg tablet TAKE 1 TABLET BY MOUTH ONCE DAILY IN THE MORNING 90 tablet 0     No current facility-administered medications on file prior to visit.   [6]   Social History  Tobacco Use    Smoking status: Former     Current packs/day: 0.00     Average packs/day: 1 pack/day for 30.0 years (30.0 ttl pk-yrs)     Types: Cigarettes     Start date: 6/15/1985     Quit date: 6/15/2015     Years since quitting: 10.1     Passive exposure: Past    Smokeless tobacco: Never   Vaping Use    Vaping status: Never Used   Substance and Sexual Activity    Alcohol use: Yes     Comment: 1 drink/month- occasional alcohol use     Drug use: No

## 2025-07-28 DIAGNOSIS — E11.42 DIABETIC POLYNEUROPATHY ASSOCIATED WITH TYPE 2 DIABETES MELLITUS (HCC): ICD-10-CM

## 2025-07-29 RX ORDER — SEMAGLUTIDE 2.68 MG/ML
INJECTION, SOLUTION SUBCUTANEOUS
Qty: 9 ML | Refills: 1 | Status: SHIPPED | OUTPATIENT
Start: 2025-07-29

## 2025-07-30 LAB
ALBUMIN SERPL-MCNC: 4.4 G/DL (ref 3.8–4.9)
ALBUMIN/CREAT UR: 22 MG/G CREAT (ref 0–29)
ALP SERPL-CCNC: 103 IU/L (ref 44–121)
ALT SERPL-CCNC: 20 IU/L (ref 0–44)
AST SERPL-CCNC: 15 IU/L (ref 0–40)
BASOPHILS # BLD AUTO: 0.1 X10E3/UL (ref 0–0.2)
BASOPHILS NFR BLD AUTO: 1 %
BILIRUB SERPL-MCNC: 0.7 MG/DL (ref 0–1.2)
BUN SERPL-MCNC: 10 MG/DL (ref 6–24)
BUN/CREAT SERPL: 14 (ref 9–20)
CALCIUM SERPL-MCNC: 9.6 MG/DL (ref 8.7–10.2)
CHLORIDE SERPL-SCNC: 98 MMOL/L (ref 96–106)
CO2 SERPL-SCNC: 23 MMOL/L (ref 20–29)
CREAT SERPL-MCNC: 0.73 MG/DL (ref 0.76–1.27)
CREAT UR-MCNC: 185.6 MG/DL
EGFR: 107 ML/MIN/1.73
EOSINOPHIL # BLD AUTO: 0.2 X10E3/UL (ref 0–0.4)
EOSINOPHIL NFR BLD AUTO: 2 %
ERYTHROCYTE [DISTWIDTH] IN BLOOD BY AUTOMATED COUNT: 13.4 % (ref 11.6–15.4)
EST. AVERAGE GLUCOSE BLD GHB EST-MCNC: 146 MG/DL
GLOBULIN SER-MCNC: 2.9 G/DL (ref 1.5–4.5)
GLUCOSE SERPL-MCNC: 97 MG/DL (ref 70–99)
HBA1C MFR BLD: 6.7 % (ref 4.8–5.6)
HCT VFR BLD AUTO: 45.6 % (ref 37.5–51)
HGB BLD-MCNC: 15.4 G/DL (ref 13–17.7)
IMM GRANULOCYTES # BLD: 0 X10E3/UL (ref 0–0.1)
IMM GRANULOCYTES NFR BLD: 0 %
LYMPHOCYTES # BLD AUTO: 2.5 X10E3/UL (ref 0.7–3.1)
LYMPHOCYTES NFR BLD AUTO: 23 %
MCH RBC QN AUTO: 30.1 PG (ref 26.6–33)
MCHC RBC AUTO-ENTMCNC: 33.8 G/DL (ref 31.5–35.7)
MCV RBC AUTO: 89 FL (ref 79–97)
MICROALBUMIN UR-MCNC: 40.4 UG/ML
MONOCYTES # BLD AUTO: 0.6 X10E3/UL (ref 0.1–0.9)
MONOCYTES NFR BLD AUTO: 6 %
NEUTROPHILS # BLD AUTO: 7.4 X10E3/UL (ref 1.4–7)
NEUTROPHILS NFR BLD AUTO: 68 %
PLATELET # BLD AUTO: 265 X10E3/UL (ref 150–450)
POTASSIUM SERPL-SCNC: 4.6 MMOL/L (ref 3.5–5.2)
PROT SERPL-MCNC: 7.3 G/DL (ref 6–8.5)
RBC # BLD AUTO: 5.12 X10E6/UL (ref 4.14–5.8)
SODIUM SERPL-SCNC: 137 MMOL/L (ref 134–144)
WBC # BLD AUTO: 10.8 X10E3/UL (ref 3.4–10.8)

## 2025-08-01 ENCOUNTER — OFFICE VISIT (OUTPATIENT)
Dept: FAMILY MEDICINE CLINIC | Facility: CLINIC | Age: 55
End: 2025-08-01
Payer: COMMERCIAL

## 2025-08-01 VITALS
HEART RATE: 93 BPM | TEMPERATURE: 97 F | BODY MASS INDEX: 42.66 KG/M2 | RESPIRATION RATE: 18 BRPM | DIASTOLIC BLOOD PRESSURE: 80 MMHG | OXYGEN SATURATION: 97 % | WEIGHT: 315 LBS | SYSTOLIC BLOOD PRESSURE: 124 MMHG | HEIGHT: 72 IN

## 2025-08-01 DIAGNOSIS — E11.42 DIABETIC POLYNEUROPATHY ASSOCIATED WITH TYPE 2 DIABETES MELLITUS (HCC): ICD-10-CM

## 2025-08-01 DIAGNOSIS — Z12.5 SCREENING FOR PROSTATE CANCER: ICD-10-CM

## 2025-08-01 DIAGNOSIS — E78.2 MIXED HYPERLIPIDEMIA: ICD-10-CM

## 2025-08-01 DIAGNOSIS — Z87.891 FORMER SMOKER: ICD-10-CM

## 2025-08-01 DIAGNOSIS — F17.211 NICOTINE DEPENDENCE, CIGARETTES, IN REMISSION: ICD-10-CM

## 2025-08-01 DIAGNOSIS — Z00.00 ANNUAL PHYSICAL EXAM: Primary | ICD-10-CM

## 2025-08-01 DIAGNOSIS — Z12.2 SCREENING FOR LUNG CANCER: ICD-10-CM

## 2025-08-01 DIAGNOSIS — I10 BENIGN ESSENTIAL HYPERTENSION: ICD-10-CM

## 2025-08-01 PROCEDURE — 99396 PREV VISIT EST AGE 40-64: CPT | Performed by: FAMILY MEDICINE

## 2025-08-01 RX ORDER — INSULIN DEGLUDEC 200 U/ML
58 INJECTION, SOLUTION SUBCUTANEOUS
Qty: 27 ML | Refills: 1 | Status: SHIPPED | OUTPATIENT
Start: 2025-08-01 | End: 2026-01-28